# Patient Record
Sex: MALE | Race: WHITE | Employment: UNEMPLOYED | URBAN - METROPOLITAN AREA
[De-identification: names, ages, dates, MRNs, and addresses within clinical notes are randomized per-mention and may not be internally consistent; named-entity substitution may affect disease eponyms.]

---

## 2017-02-10 ENCOUNTER — HOSPITAL ENCOUNTER (OUTPATIENT)
Dept: LAB | Age: 82
Discharge: HOME | End: 2017-02-10
Payer: MEDICARE

## 2017-02-10 LAB
ALBUMIN SERPL-MCNC: 4.1 G/DL (ref 3.9–4.9)
ALP BLD-CCNC: 81 U/L (ref 35–104)
ALT SERPL-CCNC: 17 U/L (ref 0–41)
ANION GAP SERPL CALCULATED.3IONS-SCNC: 14 MEQ/L (ref 7–13)
AST SERPL-CCNC: 21 U/L (ref 0–40)
BASOPHILS ABSOLUTE: 0 K/UL (ref 0–0.2)
BASOPHILS RELATIVE PERCENT: 0.5 %
BILIRUB SERPL-MCNC: 0.7 MG/DL (ref 0–1.2)
BUN BLDV-MCNC: 16 MG/DL (ref 8–23)
CALCIUM SERPL-MCNC: 8.9 MG/DL (ref 8.6–10.2)
CHLORIDE BLD-SCNC: 102 MEQ/L (ref 98–107)
CHOLESTEROL, TOTAL: 144 MG/DL (ref 0–199)
CO2: 20 MEQ/L (ref 22–29)
CREAT SERPL-MCNC: 0.72 MG/DL (ref 0.7–1.2)
EOSINOPHILS ABSOLUTE: 0.2 K/UL (ref 0–0.7)
EOSINOPHILS RELATIVE PERCENT: 1.8 %
GFR AFRICAN AMERICAN: >60
GFR NON-AFRICAN AMERICAN: >60
GLOBULIN: 2.2 G/DL (ref 2.3–3.5)
GLUCOSE BLD-MCNC: 65 MG/DL (ref 74–109)
HCT VFR BLD CALC: 46 % (ref 42–52)
HEMOGLOBIN: 15.8 G/DL (ref 14–18)
LYMPHOCYTES ABSOLUTE: 3.2 K/UL (ref 1–4.8)
LYMPHOCYTES RELATIVE PERCENT: 32.9 %
MCH RBC QN AUTO: 32.5 PG (ref 27–31.3)
MCHC RBC AUTO-ENTMCNC: 34.4 % (ref 33–37)
MCV RBC AUTO: 94.5 FL (ref 80–100)
MONOCYTES ABSOLUTE: 0.7 K/UL (ref 0.2–0.8)
MONOCYTES RELATIVE PERCENT: 7 %
NEUTROPHILS ABSOLUTE: 5.6 K/UL (ref 1.4–6.5)
NEUTROPHILS RELATIVE PERCENT: 57.8 %
PDW BLD-RTO: 13.2 % (ref 11.5–14.5)
PLATELET # BLD: 179 K/UL (ref 130–400)
POTASSIUM SERPL-SCNC: 3.9 MEQ/L (ref 3.5–5.1)
PROSTATE SPECIFIC ANTIGEN: 0.85 NG/ML (ref 0–6.22)
RBC # BLD: 4.87 M/UL (ref 4.7–6.1)
SODIUM BLD-SCNC: 136 MEQ/L (ref 132–144)
TOTAL PROTEIN: 6.3 G/DL (ref 6.4–8.1)
WBC # BLD: 9.7 K/UL (ref 4.8–10.8)

## 2017-02-10 PROCEDURE — 82465 ASSAY BLD/SERUM CHOLESTEROL: CPT

## 2017-02-10 PROCEDURE — 85025 COMPLETE CBC W/AUTO DIFF WBC: CPT

## 2017-02-10 PROCEDURE — G0103 PSA SCREENING: HCPCS

## 2017-02-10 PROCEDURE — 80053 COMPREHEN METABOLIC PANEL: CPT

## 2017-05-02 ENCOUNTER — OFFICE VISIT (OUTPATIENT)
Dept: UROLOGY | Age: 82
End: 2017-05-02

## 2017-05-02 VITALS
DIASTOLIC BLOOD PRESSURE: 84 MMHG | HEART RATE: 64 BPM | BODY MASS INDEX: 32.23 KG/M2 | HEIGHT: 72 IN | WEIGHT: 238 LBS | SYSTOLIC BLOOD PRESSURE: 142 MMHG

## 2017-05-02 DIAGNOSIS — R31.9 HEMATURIA: Primary | ICD-10-CM

## 2017-05-02 LAB
BILIRUBIN, POC: NORMAL
BLOOD URINE, POC: NORMAL
CLARITY, POC: CLEAR
COLOR, POC: YELLOW
GLUCOSE URINE, POC: NORMAL
KETONES, POC: NORMAL
LEUKOCYTE EST, POC: NORMAL
NITRITE, POC: NORMAL
PH, POC: 5
PROTEIN, POC: NORMAL
SPECIFIC GRAVITY, POC: 1.01
UROBILINOGEN, POC: 0.2

## 2017-05-02 PROCEDURE — 1123F ACP DISCUSS/DSCN MKR DOCD: CPT | Performed by: UROLOGY

## 2017-05-02 PROCEDURE — 1036F TOBACCO NON-USER: CPT | Performed by: UROLOGY

## 2017-05-02 PROCEDURE — 4040F PNEUMOC VAC/ADMIN/RCVD: CPT | Performed by: UROLOGY

## 2017-05-02 PROCEDURE — 99213 OFFICE O/P EST LOW 20 MIN: CPT | Performed by: UROLOGY

## 2017-05-02 PROCEDURE — G8417 CALC BMI ABV UP PARAM F/U: HCPCS | Performed by: UROLOGY

## 2017-05-02 PROCEDURE — G8427 DOCREV CUR MEDS BY ELIG CLIN: HCPCS | Performed by: UROLOGY

## 2017-05-02 PROCEDURE — 81003 URINALYSIS AUTO W/O SCOPE: CPT | Performed by: UROLOGY

## 2017-05-02 RX ORDER — M-VIT,TX,IRON,MINS/CALC/FOLIC 27MG-0.4MG
1 TABLET ORAL DAILY
COMMUNITY

## 2017-05-02 RX ORDER — SODIUM PHOSPHATE,MONO-DIBASIC 19G-7G/118
500 ENEMA (ML) RECTAL 3 TIMES DAILY
COMMUNITY
End: 2018-05-03

## 2017-10-26 RX ORDER — FINASTERIDE 5 MG/1
5 TABLET, FILM COATED ORAL DAILY
Qty: 90 TABLET | Refills: 3 | Status: SHIPPED | OUTPATIENT
Start: 2017-10-26 | End: 2018-05-03

## 2017-10-27 RX ORDER — TERAZOSIN 1 MG/1
1 CAPSULE ORAL NIGHTLY
Qty: 90 CAPSULE | Refills: 3 | Status: SHIPPED | OUTPATIENT
Start: 2017-10-27 | End: 2018-05-03

## 2018-01-15 ENCOUNTER — HOSPITAL ENCOUNTER (OUTPATIENT)
Dept: LAB | Age: 83
Discharge: HOME | End: 2018-01-15
Payer: MEDICARE

## 2018-01-15 LAB
ALBUMIN SERPL-MCNC: 3.8 G/DL (ref 3.9–4.9)
ALP BLD-CCNC: 82 U/L (ref 35–104)
ALT SERPL-CCNC: 18 U/L (ref 0–41)
ANION GAP SERPL CALCULATED.3IONS-SCNC: 19 MEQ/L (ref 7–13)
AST SERPL-CCNC: 17 U/L (ref 0–40)
BILIRUB SERPL-MCNC: 0.5 MG/DL (ref 0–1.2)
BUN BLDV-MCNC: 14 MG/DL (ref 8–23)
CALCIUM SERPL-MCNC: 8.8 MG/DL (ref 8.6–10.2)
CHLORIDE BLD-SCNC: 102 MEQ/L (ref 98–107)
CHOLESTEROL, TOTAL: 138 MG/DL (ref 0–199)
CO2: 21 MEQ/L (ref 22–29)
CREAT SERPL-MCNC: 0.77 MG/DL (ref 0.7–1.2)
GFR AFRICAN AMERICAN: >60
GFR NON-AFRICAN AMERICAN: >60
GLOBULIN: 2.3 G/DL (ref 2.3–3.5)
GLUCOSE BLD-MCNC: 60 MG/DL (ref 74–109)
HCT VFR BLD CALC: 47 % (ref 42–52)
HDLC SERPL-MCNC: 36 MG/DL (ref 40–59)
HEMOGLOBIN: 16.1 G/DL (ref 14–18)
LDL CHOLESTEROL CALCULATED: 77 MG/DL (ref 0–129)
MCH RBC QN AUTO: 33.4 PG (ref 27–31.3)
MCHC RBC AUTO-ENTMCNC: 34.4 % (ref 33–37)
MCV RBC AUTO: 97.3 FL (ref 80–100)
PDW BLD-RTO: 13.1 % (ref 11.5–14.5)
PLATELET # BLD: 173 K/UL (ref 130–400)
POTASSIUM SERPL-SCNC: 4.1 MEQ/L (ref 3.5–5.1)
PROSTATE SPECIFIC ANTIGEN: 0.64 NG/ML (ref 0–6.22)
RBC # BLD: 4.83 M/UL (ref 4.7–6.1)
SODIUM BLD-SCNC: 142 MEQ/L (ref 132–144)
TOTAL PROTEIN: 6.1 G/DL (ref 6.4–8.1)
TRIGL SERPL-MCNC: 126 MG/DL (ref 0–200)
WBC # BLD: 10.5 K/UL (ref 4.8–10.8)

## 2018-01-15 PROCEDURE — 80053 COMPREHEN METABOLIC PANEL: CPT

## 2018-01-15 PROCEDURE — 80061 LIPID PANEL: CPT

## 2018-01-15 PROCEDURE — 85027 COMPLETE CBC AUTOMATED: CPT

## 2018-01-15 PROCEDURE — G0103 PSA SCREENING: HCPCS

## 2018-05-03 ENCOUNTER — OFFICE VISIT (OUTPATIENT)
Dept: UROLOGY | Age: 83
End: 2018-05-03
Payer: MEDICARE

## 2018-05-03 VITALS
SYSTOLIC BLOOD PRESSURE: 100 MMHG | BODY MASS INDEX: 31.54 KG/M2 | WEIGHT: 238 LBS | DIASTOLIC BLOOD PRESSURE: 62 MMHG | HEART RATE: 63 BPM | HEIGHT: 73 IN

## 2018-05-03 DIAGNOSIS — R31.29 MICROHEMATURIA: Primary | ICD-10-CM

## 2018-05-03 LAB
BILIRUBIN, POC: NORMAL
BLOOD URINE, POC: NORMAL
CLARITY, POC: CLEAR
COLOR, POC: YELLOW
GLUCOSE URINE, POC: NORMAL
KETONES, POC: NORMAL
LEUKOCYTE EST, POC: NORMAL
NITRITE, POC: NORMAL
PH, POC: 5.5
PROTEIN, POC: NORMAL
SPECIFIC GRAVITY, POC: 1.02
UROBILINOGEN, POC: 0.2

## 2018-05-03 PROCEDURE — 1123F ACP DISCUSS/DSCN MKR DOCD: CPT | Performed by: UROLOGY

## 2018-05-03 PROCEDURE — 4040F PNEUMOC VAC/ADMIN/RCVD: CPT | Performed by: UROLOGY

## 2018-05-03 PROCEDURE — G8427 DOCREV CUR MEDS BY ELIG CLIN: HCPCS | Performed by: UROLOGY

## 2018-05-03 PROCEDURE — G8417 CALC BMI ABV UP PARAM F/U: HCPCS | Performed by: UROLOGY

## 2018-05-03 PROCEDURE — 81003 URINALYSIS AUTO W/O SCOPE: CPT | Performed by: UROLOGY

## 2018-05-03 PROCEDURE — 1036F TOBACCO NON-USER: CPT | Performed by: UROLOGY

## 2018-05-03 PROCEDURE — 99213 OFFICE O/P EST LOW 20 MIN: CPT | Performed by: UROLOGY

## 2018-05-03 RX ORDER — FLUTICASONE PROPIONATE 50 MCG
1-2 SPRAY, SUSPENSION (ML) NASAL
COMMUNITY

## 2018-05-03 RX ORDER — MONTELUKAST SODIUM 10 MG/1
10 TABLET ORAL NIGHTLY
COMMUNITY

## 2018-08-15 RX ORDER — TERAZOSIN 1 MG/1
1 CAPSULE ORAL NIGHTLY
Qty: 90 CAPSULE | Refills: 3 | Status: SHIPPED | OUTPATIENT
Start: 2018-08-15 | End: 2019-10-21 | Stop reason: SDUPTHER

## 2018-11-29 ENCOUNTER — HOSPITAL ENCOUNTER (OUTPATIENT)
Dept: LAB | Age: 83
Discharge: HOME | End: 2018-11-29
Payer: MEDICARE

## 2018-11-29 LAB
ALBUMIN SERPL-MCNC: 3.9 G/DL (ref 3.9–4.9)
ALP BLD-CCNC: 75 U/L (ref 35–104)
ALT SERPL-CCNC: 17 U/L (ref 0–41)
ANION GAP SERPL CALCULATED.3IONS-SCNC: 16 MEQ/L (ref 7–13)
AST SERPL-CCNC: 19 U/L (ref 0–40)
BILIRUB SERPL-MCNC: 0.7 MG/DL (ref 0–1.2)
BUN BLDV-MCNC: 11 MG/DL (ref 8–23)
CALCIUM SERPL-MCNC: 9.3 MG/DL (ref 8.6–10.2)
CHLORIDE BLD-SCNC: 103 MEQ/L (ref 98–107)
CHOLESTEROL, TOTAL: 136 MG/DL (ref 0–199)
CO2: 22 MEQ/L (ref 22–29)
CREAT SERPL-MCNC: 0.82 MG/DL (ref 0.7–1.2)
GFR AFRICAN AMERICAN: >60
GFR NON-AFRICAN AMERICAN: >60
GLOBULIN: 2.6 G/DL (ref 2.3–3.5)
GLUCOSE BLD-MCNC: 66 MG/DL (ref 74–109)
HCT VFR BLD CALC: 45.9 % (ref 42–52)
HDLC SERPL-MCNC: 34 MG/DL (ref 40–59)
HEMOGLOBIN: 15.8 G/DL (ref 14–18)
LDL CHOLESTEROL CALCULATED: 64 MG/DL (ref 0–129)
MCH RBC QN AUTO: 33.3 PG (ref 27–31.3)
MCHC RBC AUTO-ENTMCNC: 34.5 % (ref 33–37)
MCV RBC AUTO: 96.6 FL (ref 80–100)
PDW BLD-RTO: 13.1 % (ref 11.5–14.5)
PLATELET # BLD: 185 K/UL (ref 130–400)
POTASSIUM SERPL-SCNC: 3.9 MEQ/L (ref 3.5–5.1)
PROSTATE SPECIFIC ANTIGEN: 0.6 NG/ML (ref 0–6.22)
RBC # BLD: 4.75 M/UL (ref 4.7–6.1)
SODIUM BLD-SCNC: 141 MEQ/L (ref 132–144)
TOTAL PROTEIN: 6.5 G/DL (ref 6.4–8.1)
TRIGL SERPL-MCNC: 190 MG/DL (ref 0–200)
TSH SERPL DL<=0.05 MIU/L-ACNC: 0.88 UIU/ML (ref 0.27–4.2)
WBC # BLD: 11.2 K/UL (ref 4.8–10.8)

## 2018-11-29 PROCEDURE — 85027 COMPLETE CBC AUTOMATED: CPT

## 2018-11-29 PROCEDURE — 84443 ASSAY THYROID STIM HORMONE: CPT

## 2018-11-29 PROCEDURE — 80061 LIPID PANEL: CPT

## 2018-11-29 PROCEDURE — G0103 PSA SCREENING: HCPCS

## 2018-11-29 PROCEDURE — 80053 COMPREHEN METABOLIC PANEL: CPT

## 2018-12-21 RX ORDER — FINASTERIDE 5 MG/1
5 TABLET, FILM COATED ORAL DAILY
Qty: 90 TABLET | Refills: 3 | Status: SHIPPED | OUTPATIENT
Start: 2018-12-21 | End: 2019-12-12 | Stop reason: SDUPTHER

## 2019-10-22 RX ORDER — TERAZOSIN 1 MG/1
1 CAPSULE ORAL NIGHTLY
Qty: 90 CAPSULE | Refills: 3 | Status: SHIPPED | OUTPATIENT
Start: 2019-10-22

## 2019-11-18 ENCOUNTER — HOSPITAL ENCOUNTER (OUTPATIENT)
Dept: LAB | Age: 84
Discharge: HOME | End: 2019-11-18
Payer: MEDICARE

## 2019-11-18 LAB
ALBUMIN SERPL-MCNC: 4.3 G/DL (ref 3.5–4.6)
ALP BLD-CCNC: 86 U/L (ref 35–104)
ALT SERPL-CCNC: 16 U/L (ref 0–41)
ANION GAP SERPL CALCULATED.3IONS-SCNC: 14 MEQ/L (ref 9–15)
AST SERPL-CCNC: 20 U/L (ref 0–40)
BILIRUB SERPL-MCNC: 0.6 MG/DL (ref 0.2–0.7)
BUN BLDV-MCNC: 16 MG/DL (ref 8–23)
CALCIUM SERPL-MCNC: 9.3 MG/DL (ref 8.5–9.9)
CHLORIDE BLD-SCNC: 105 MEQ/L (ref 95–107)
CHOLESTEROL, TOTAL: 135 MG/DL (ref 0–199)
CO2: 23 MEQ/L (ref 20–31)
CREAT SERPL-MCNC: 0.75 MG/DL (ref 0.7–1.2)
GFR AFRICAN AMERICAN: >60
GFR NON-AFRICAN AMERICAN: >60
GLOBULIN: 2.7 G/DL (ref 2.3–3.5)
GLUCOSE BLD-MCNC: 74 MG/DL (ref 70–99)
HCT VFR BLD CALC: 45.6 % (ref 42–52)
HDLC SERPL-MCNC: 33 MG/DL (ref 40–59)
HEMOGLOBIN: 15.2 G/DL (ref 14–18)
LDL CHOLESTEROL CALCULATED: 68 MG/DL (ref 0–129)
MAGNESIUM: 2.2 MG/DL (ref 1.7–2.4)
MCH RBC QN AUTO: 32.5 PG (ref 27–31.3)
MCHC RBC AUTO-ENTMCNC: 33.3 % (ref 33–37)
MCV RBC AUTO: 97.9 FL (ref 80–100)
PDW BLD-RTO: 13 % (ref 11.5–14.5)
PLATELET # BLD: 213 K/UL (ref 130–400)
POTASSIUM SERPL-SCNC: 3.8 MEQ/L (ref 3.4–4.9)
RBC # BLD: 4.66 M/UL (ref 4.7–6.1)
SODIUM BLD-SCNC: 142 MEQ/L (ref 135–144)
TOTAL PROTEIN: 7 G/DL (ref 6.3–8)
TRIGL SERPL-MCNC: 169 MG/DL (ref 0–150)
TSH SERPL DL<=0.05 MIU/L-ACNC: 0.94 UIU/ML (ref 0.44–3.86)
WBC # BLD: 11.9 K/UL (ref 4.8–10.8)

## 2019-11-18 PROCEDURE — 80061 LIPID PANEL: CPT

## 2019-11-18 PROCEDURE — 84443 ASSAY THYROID STIM HORMONE: CPT

## 2019-11-18 PROCEDURE — 85027 COMPLETE CBC AUTOMATED: CPT

## 2019-11-18 PROCEDURE — 36415 COLL VENOUS BLD VENIPUNCTURE: CPT

## 2019-11-18 PROCEDURE — 80053 COMPREHEN METABOLIC PANEL: CPT

## 2019-11-18 PROCEDURE — 83735 ASSAY OF MAGNESIUM: CPT

## 2019-12-13 RX ORDER — FINASTERIDE 5 MG/1
5 TABLET, FILM COATED ORAL DAILY
Qty: 90 TABLET | Refills: 3 | Status: SHIPPED | OUTPATIENT
Start: 2019-12-13 | End: 2020-12-07

## 2020-12-07 RX ORDER — FINASTERIDE 5 MG/1
5 TABLET, FILM COATED ORAL DAILY
Qty: 90 TABLET | Refills: 3 | Status: SHIPPED | OUTPATIENT
Start: 2020-12-07 | End: 2021-12-01

## 2021-01-13 ENCOUNTER — TELEPHONE (OUTPATIENT)
Dept: UROLOGY | Age: 86
End: 2021-01-13

## 2021-01-13 DIAGNOSIS — R35.1 NOCTURIA: Primary | ICD-10-CM

## 2021-01-13 NOTE — TELEPHONE ENCOUNTER
Madyson Leong [phone] called stating that pt has burning, frequency, nocturia. He believes that he has a UTI. Son would like to know if a urine sample is needed or if an antibiotic can be prescribed. He really does not want to bring his dad out due to [place].  Please advise

## 2021-01-14 DIAGNOSIS — R35.1 NOCTURIA: ICD-10-CM

## 2021-01-16 LAB — URINE CULTURE, ROUTINE: NORMAL

## 2021-06-08 ENCOUNTER — HOSPITAL ENCOUNTER (OUTPATIENT)
Dept: LAB | Age: 86
Discharge: HOME | End: 2021-06-08
Payer: MEDICARE

## 2021-06-08 LAB
ALBUMIN SERPL-MCNC: 4.2 G/DL (ref 3.5–4.6)
ALP BLD-CCNC: 92 U/L (ref 35–104)
ALT SERPL-CCNC: 20 U/L (ref 0–41)
ANION GAP SERPL CALCULATED.3IONS-SCNC: 16 MEQ/L (ref 9–15)
AST SERPL-CCNC: 16 U/L (ref 0–40)
BILIRUB SERPL-MCNC: 0.5 MG/DL (ref 0.2–0.7)
BUN BLDV-MCNC: 17 MG/DL (ref 8–23)
CALCIUM SERPL-MCNC: 9.3 MG/DL (ref 8.5–9.9)
CHLORIDE BLD-SCNC: 104 MEQ/L (ref 95–107)
CHOLESTEROL, TOTAL: 163 MG/DL (ref 0–199)
CO2: 21 MEQ/L (ref 20–31)
CREAT SERPL-MCNC: 0.74 MG/DL (ref 0.7–1.2)
GFR AFRICAN AMERICAN: >60
GFR NON-AFRICAN AMERICAN: >60
GLOBULIN: 2.6 G/DL (ref 2.3–3.5)
GLUCOSE BLD-MCNC: 57 MG/DL (ref 70–99)
HCT VFR BLD CALC: 46 % (ref 42–52)
HDLC SERPL-MCNC: 54 MG/DL (ref 40–59)
HEMOGLOBIN: 15.4 G/DL (ref 14–18)
LDL CHOLESTEROL CALCULATED: 94 MG/DL (ref 0–129)
MCH RBC QN AUTO: 32.3 PG (ref 27–31.3)
MCHC RBC AUTO-ENTMCNC: 33.5 % (ref 33–37)
MCV RBC AUTO: 96.2 FL (ref 80–100)
PDW BLD-RTO: 13.2 % (ref 11.5–14.5)
PLATELET # BLD: 257 K/UL (ref 130–400)
POTASSIUM SERPL-SCNC: 4.1 MEQ/L (ref 3.4–4.9)
PROSTATE SPECIFIC ANTIGEN: 0.89 NG/ML (ref 0–6.22)
RBC # BLD: 4.78 M/UL (ref 4.7–6.1)
SODIUM BLD-SCNC: 141 MEQ/L (ref 135–144)
TOTAL PROTEIN: 6.8 G/DL (ref 6.3–8)
TRIGL SERPL-MCNC: 75 MG/DL (ref 0–150)
WBC # BLD: 19.4 K/UL (ref 4.8–10.8)

## 2021-06-08 PROCEDURE — 80053 COMPREHEN METABOLIC PANEL: CPT

## 2021-06-08 PROCEDURE — 85027 COMPLETE CBC AUTOMATED: CPT

## 2021-06-08 PROCEDURE — 80061 LIPID PANEL: CPT

## 2021-06-08 PROCEDURE — 84153 ASSAY OF PSA TOTAL: CPT

## 2021-12-01 RX ORDER — FINASTERIDE 5 MG/1
5 TABLET, FILM COATED ORAL DAILY
Qty: 90 TABLET | Refills: 3 | Status: SHIPPED | OUTPATIENT
Start: 2021-12-01

## 2021-12-01 NOTE — TELEPHONE ENCOUNTER
Patient needs refills on Finasteride send to [facility].  Order is attached to sign.

## 2022-04-27 ENCOUNTER — HOSPITAL ENCOUNTER (OUTPATIENT)
Dept: LAB | Age: 87
Discharge: HOME | End: 2022-04-27
Payer: MEDICARE

## 2022-04-27 LAB
ALBUMIN SERPL-MCNC: 3.5 G/DL (ref 3.5–4.6)
ALP BLD-CCNC: 85 U/L (ref 35–104)
ALT SERPL-CCNC: 13 U/L (ref 0–41)
ANION GAP SERPL CALCULATED.3IONS-SCNC: 16 MEQ/L (ref 9–15)
AST SERPL-CCNC: 15 U/L (ref 0–40)
BASOPHILS ABSOLUTE: 0.1 K/UL (ref 0–0.2)
BASOPHILS RELATIVE PERCENT: 1 %
BILIRUB SERPL-MCNC: 0.3 MG/DL (ref 0.2–0.7)
BUN BLDV-MCNC: 18 MG/DL (ref 8–23)
CALCIUM SERPL-MCNC: 8.8 MG/DL (ref 8.5–9.9)
CHLORIDE BLD-SCNC: 105 MEQ/L (ref 95–107)
CHOLESTEROL, TOTAL: 124 MG/DL (ref 0–199)
CO2: 19 MEQ/L (ref 20–31)
CREAT SERPL-MCNC: 0.81 MG/DL (ref 0.7–1.2)
EOSINOPHILS ABSOLUTE: 0.4 K/UL (ref 0–0.7)
EOSINOPHILS RELATIVE PERCENT: 3 %
GFR AFRICAN AMERICAN: >60
GFR NON-AFRICAN AMERICAN: >60
GLOBULIN: 2.4 G/DL (ref 2.3–3.5)
GLUCOSE BLD-MCNC: 87 MG/DL (ref 70–99)
HCT VFR BLD CALC: 43.7 % (ref 42–52)
HDLC SERPL-MCNC: 34 MG/DL (ref 40–59)
HEMOGLOBIN: 14.3 G/DL (ref 14–18)
LDL CHOLESTEROL CALCULATED: 69 MG/DL (ref 0–129)
LYMPHOCYTES ABSOLUTE: 2.1 K/UL (ref 1–4.8)
LYMPHOCYTES RELATIVE PERCENT: 15 %
MCH RBC QN AUTO: 32.2 PG (ref 27–31.3)
MCHC RBC AUTO-ENTMCNC: 32.8 % (ref 33–37)
MCV RBC AUTO: 98.2 FL (ref 80–100)
METAMYELOCYTES RELATIVE PERCENT: 1 %
MONOCYTES ABSOLUTE: 0.6 K/UL (ref 0.2–0.8)
MONOCYTES RELATIVE PERCENT: 3.8 %
NEUTROPHILS ABSOLUTE: 10.7 K/UL (ref 1.4–6.5)
NEUTROPHILS RELATIVE PERCENT: 76 %
PDW BLD-RTO: 13.1 % (ref 11.5–14.5)
PLATELET # BLD: 246 K/UL (ref 130–400)
PLATELET SLIDE REVIEW: NORMAL
POIKILOCYTES: ABNORMAL
POLYCHROMASIA: ABNORMAL
POTASSIUM SERPL-SCNC: 4.1 MEQ/L (ref 3.4–4.9)
RBC # BLD: 4.45 M/UL (ref 4.7–6.1)
SMUDGE CELLS: 5.8
SODIUM BLD-SCNC: 140 MEQ/L (ref 135–144)
TOTAL PROTEIN: 5.9 G/DL (ref 6.3–8)
TRIGL SERPL-MCNC: 103 MG/DL (ref 0–150)
TSH REFLEX: 0.75 UIU/ML (ref 0.44–3.86)
WBC # BLD: 13.9 K/UL (ref 4.8–10.8)

## 2022-04-27 PROCEDURE — 80061 LIPID PANEL: CPT

## 2022-04-27 PROCEDURE — 85025 COMPLETE CBC W/AUTO DIFF WBC: CPT

## 2022-04-27 PROCEDURE — 84443 ASSAY THYROID STIM HORMONE: CPT

## 2022-04-27 PROCEDURE — 80053 COMPREHEN METABOLIC PANEL: CPT

## 2022-09-01 ENCOUNTER — HOSPITAL ENCOUNTER (OUTPATIENT)
Dept: ULTRASOUND IMAGING | Age: 87
Discharge: HOME | End: 2022-09-03
Payer: MEDICARE

## 2022-09-01 DIAGNOSIS — R22.31 FOREARM MASS, RIGHT: ICD-10-CM

## 2022-09-01 DIAGNOSIS — M79.89 SWELLING OF RIGHT UPPER EXTREMITY: ICD-10-CM

## 2022-09-01 PROCEDURE — 93971 EXTREMITY STUDY: CPT

## 2023-05-08 ENCOUNTER — APPOINTMENT (OUTPATIENT)
Dept: CT IMAGING | Age: 88
End: 2023-05-08
Payer: MEDICARE

## 2023-05-08 ENCOUNTER — HOSPITAL ENCOUNTER (INPATIENT)
Age: 88
LOS: 4 days | Discharge: NURSING, REHAB OR LONG-TERM CARE | End: 2023-05-12
Attending: INTERNAL MEDICINE | Admitting: INTERNAL MEDICINE
Payer: MEDICARE

## 2023-05-08 ENCOUNTER — APPOINTMENT (OUTPATIENT)
Dept: GENERAL RADIOLOGY | Age: 88
End: 2023-05-08
Payer: MEDICARE

## 2023-05-08 DIAGNOSIS — J18.9 PNEUMONIA OF RIGHT UPPER LOBE DUE TO INFECTIOUS ORGANISM: ICD-10-CM
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
## 2023-05-08 DIAGNOSIS — N30.01 ACUTE CYSTITIS WITH HEMATURIA: ICD-10-CM

## 2023-05-08 DIAGNOSIS — I26.99 ACUTE PULMONARY EMBOLISM, UNSPECIFIED PULMONARY EMBOLISM TYPE, UNSPECIFIED WHETHER ACUTE COR PULMONALE PRESENT (HCC): Primary | ICD-10-CM

## 2023-05-08 LAB
ALBUMIN SERPL-MCNC: 3.1 G/DL (ref 3.5–4.6)
ALP SERPL-CCNC: 74 U/L (ref 35–104)
ALT SERPL-CCNC: 8 U/L (ref 0–41)
AMORPH SED URNS QL MICRO: ABNORMAL
ANION GAP SERPL CALCULATED.3IONS-SCNC: 13 MEQ/L (ref 9–15)
ANISOCYTOSIS BLD QL SMEAR: ABNORMAL
APTT PPP: 31.6 SEC (ref 24.4–36.8)
AST SERPL-CCNC: 16 U/L (ref 0–40)
B PARAP IS1001 DNA NPH QL NAA+NON-PROBE: NOT DETECTED
B PERT.PT PRMT NPH QL NAA+NON-PROBE: NOT DETECTED
BACTERIA URNS QL MICRO: ABNORMAL /HPF
BASOPHILS # BLD: 0 K/UL (ref 0–0.2)
BASOPHILS NFR BLD: 0.1 %
BILIRUB SERPL-MCNC: 1 MG/DL (ref 0.2–0.7)
BILIRUB UR QL STRIP: NEGATIVE
BNP BLD-MCNC: 1016 PG/ML
BUN SERPL-MCNC: 27 MG/DL (ref 8–23)
C PNEUM DNA NPH QL NAA+NON-PROBE: NOT DETECTED
CALCIUM SERPL-MCNC: 8.3 MG/DL (ref 8.5–9.9)
CHLORIDE SERPL-SCNC: 100 MEQ/L (ref 95–107)
CLARITY UR: ABNORMAL
CO2 SERPL-SCNC: 20 MEQ/L (ref 20–31)
COLOR UR: ABNORMAL
CREAT SERPL-MCNC: 1.03 MG/DL (ref 0.7–1.2)
EKG ATRIAL RATE: 89 BPM
EKG P AXIS: 31 DEGREES
EKG P-R INTERVAL: 170 MS
EKG Q-T INTERVAL: 340 MS
EKG QRS DURATION: 88 MS
EKG QTC CALCULATION (BAZETT): 413 MS
EKG R AXIS: 1 DEGREES
EKG T AXIS: 54 DEGREES
EKG VENTRICULAR RATE: 89 BPM
EOSINOPHIL # BLD: 0 K/UL (ref 0–0.7)
EOSINOPHIL NFR BLD: 0 %
EPI CELLS #/AREA URNS AUTO: ABNORMAL /HPF (ref 0–5)
EPI CELLS #/AREA URNS HPF: ABNORMAL /HPF
ERYTHROCYTE [DISTWIDTH] IN BLOOD BY AUTOMATED COUNT: 13.7 % (ref 11.5–14.5)
ETHANOL PERCENT: NORMAL G/DL
ETHANOLAMINE SERPL-MCNC: <10 MG/DL (ref 0–0.08)
FLUAV RNA NPH QL NAA+NON-PROBE: NOT DETECTED
FLUBV RNA NPH QL NAA+NON-PROBE: NOT DETECTED
GLOBULIN SER CALC-MCNC: 2.4 G/DL (ref 2.3–3.5)
GLUCOSE SERPL-MCNC: 107 MG/DL (ref 70–99)
GLUCOSE UR STRIP-MCNC: NEGATIVE MG/DL
HADV DNA NPH QL NAA+NON-PROBE: NOT DETECTED
HCOV 229E RNA NPH QL NAA+NON-PROBE: NOT DETECTED
HCOV HKU1 RNA NPH QL NAA+NON-PROBE: NOT DETECTED
HCOV NL63 RNA NPH QL NAA+NON-PROBE: NOT DETECTED
HCOV OC43 RNA NPH QL NAA+NON-PROBE: NOT DETECTED
HCT VFR BLD AUTO: 37.5 % (ref 42–52)
HGB BLD-MCNC: 12.8 G/DL (ref 14–18)
HGB UR QL STRIP: ABNORMAL
HMPV RNA NPH QL NAA+NON-PROBE: NOT DETECTED
HPIV1 RNA NPH QL NAA+NON-PROBE: NOT DETECTED
HPIV2 RNA NPH QL NAA+NON-PROBE: NOT DETECTED
HPIV3 RNA NPH QL NAA+NON-PROBE: NOT DETECTED
HPIV4 RNA NPH QL NAA+NON-PROBE: NOT DETECTED
INR PPP: 1.2
KETONES UR STRIP-MCNC: 15 MG/DL
LACTATE BLDV-SCNC: 1.6 MMOL/L (ref 0.5–2.2)
LEUKOCYTE ESTERASE UR QL STRIP: ABNORMAL
LYMPHOCYTES # BLD: 3.5 K/UL (ref 1–4.8)
LYMPHOCYTES NFR BLD: 11 %
M PNEUMO DNA NPH QL NAA+NON-PROBE: NOT DETECTED
MCH RBC QN AUTO: 32.9 PG (ref 27–31.3)
MCHC RBC AUTO-ENTMCNC: 34 % (ref 33–37)
MCV RBC AUTO: 96.8 FL (ref 79–92.2)
MONOCYTES # BLD: 2.2 K/UL (ref 0.2–0.8)
MONOCYTES NFR BLD: 7 %
NEUTROPHILS # BLD: 26.1 K/UL (ref 1.4–6.5)
NEUTS BAND NFR BLD MANUAL: 4 %
NEUTS SEG NFR BLD: 79 %
NITRITE UR QL STRIP: NEGATIVE
PATH INTERP BLD-IMP: NORMAL
PATH INTERP BLD-IMP: YES
PH UR STRIP: 5.5 [PH] (ref 5–9)
PLATELET # BLD AUTO: 165 K/UL (ref 130–400)
PLATELET BLD QL SMEAR: ADEQUATE
POTASSIUM SERPL-SCNC: 3.7 MEQ/L (ref 3.4–4.9)
PROCALCITONIN SERPL IA-MCNC: 0.32 NG/ML (ref 0–0.15)
PROT SERPL-MCNC: 5.5 G/DL (ref 6.3–8)
PROT UR STRIP-MCNC: 30 MG/DL
PROTHROMBIN TIME: 15.7 SEC (ref 12.3–14.9)
RBC # BLD AUTO: 3.88 M/UL (ref 4.7–6.1)
RBC #/AREA URNS HPF: ABNORMAL /HPF (ref 0–2)
RSV RNA NPH QL NAA+NON-PROBE: NOT DETECTED
RV+EV RNA NPH QL NAA+NON-PROBE: NOT DETECTED
SARS-COV-2 RNA NPH QL NAA+NON-PROBE: NOT DETECTED
SODIUM SERPL-SCNC: 133 MEQ/L (ref 135–144)
SP GR UR STRIP: 1.02 (ref 1–1.03)
TROPONIN T SERPL-MCNC: <0.01 NG/ML (ref 0–0.01)
URINE REFLEX TO CULTURE: YES
UROBILINOGEN UR STRIP-ACNC: 1 E.U./DL
WBC # BLD AUTO: 31.5 K/UL (ref 4.8–10.8)
WBC #/AREA URNS AUTO: ABNORMAL /HPF (ref 0–5)
WBC #/AREA URNS HPF: ABNORMAL /HPF (ref 0–5)

## 2023-05-08 PROCEDURE — 70450 CT HEAD/BRAIN W/O DYE: CPT

## 2023-05-08 PROCEDURE — 6360000004 HC RX CONTRAST MEDICATION: Performed by: PHYSICIAN ASSISTANT

## 2023-05-08 PROCEDURE — 71275 CT ANGIOGRAPHY CHEST: CPT

## 2023-05-08 PROCEDURE — 85730 THROMBOPLASTIN TIME PARTIAL: CPT

## 2023-05-08 PROCEDURE — 93005 ELECTROCARDIOGRAM TRACING: CPT | Performed by: PHYSICIAN ASSISTANT

## 2023-05-08 PROCEDURE — 2580000003 HC RX 258

## 2023-05-08 PROCEDURE — 81001 URINALYSIS AUTO W/SCOPE: CPT

## 2023-05-08 PROCEDURE — 99285 EMERGENCY DEPT VISIT HI MDM: CPT

## 2023-05-08 PROCEDURE — 6370000000 HC RX 637 (ALT 250 FOR IP): Performed by: INTERNAL MEDICINE

## 2023-05-08 PROCEDURE — 87077 CULTURE AEROBIC IDENTIFY: CPT

## 2023-05-08 PROCEDURE — 87040 BLOOD CULTURE FOR BACTERIA: CPT

## 2023-05-08 PROCEDURE — 6360000002 HC RX W HCPCS

## 2023-05-08 PROCEDURE — 83880 ASSAY OF NATRIURETIC PEPTIDE: CPT

## 2023-05-08 PROCEDURE — 71045 X-RAY EXAM CHEST 1 VIEW: CPT

## 2023-05-08 PROCEDURE — 83605 ASSAY OF LACTIC ACID: CPT

## 2023-05-08 PROCEDURE — 84145 PROCALCITONIN (PCT): CPT

## 2023-05-08 PROCEDURE — 93010 ELECTROCARDIOGRAM REPORT: CPT | Performed by: INTERNAL MEDICINE

## 2023-05-08 PROCEDURE — 6360000002 HC RX W HCPCS: Performed by: INTERNAL MEDICINE

## 2023-05-08 PROCEDURE — 72125 CT NECK SPINE W/O DYE: CPT

## 2023-05-08 PROCEDURE — 84484 ASSAY OF TROPONIN QUANT: CPT

## 2023-05-08 PROCEDURE — 80053 COMPREHEN METABOLIC PANEL: CPT

## 2023-05-08 PROCEDURE — 2580000003 HC RX 258: Performed by: PHYSICIAN ASSISTANT

## 2023-05-08 PROCEDURE — 96365 THER/PROPH/DIAG IV INF INIT: CPT

## 2023-05-08 PROCEDURE — 6830039000 HC L3 TRAUMA ALERT

## 2023-05-08 PROCEDURE — 85610 PROTHROMBIN TIME: CPT

## 2023-05-08 PROCEDURE — 2580000003 HC RX 258: Performed by: INTERNAL MEDICINE

## 2023-05-08 PROCEDURE — 99223 1ST HOSP IP/OBS HIGH 75: CPT | Performed by: INTERNAL MEDICINE

## 2023-05-08 PROCEDURE — 82077 ASSAY SPEC XCP UR&BREATH IA: CPT

## 2023-05-08 PROCEDURE — 0202U NFCT DS 22 TRGT SARS-COV-2: CPT

## 2023-05-08 PROCEDURE — 74177 CT ABD & PELVIS W/CONTRAST: CPT

## 2023-05-08 PROCEDURE — 36415 COLL VENOUS BLD VENIPUNCTURE: CPT

## 2023-05-08 PROCEDURE — 85025 COMPLETE CBC W/AUTO DIFF WBC: CPT

## 2023-05-08 PROCEDURE — 87086 URINE CULTURE/COLONY COUNT: CPT

## 2023-05-08 PROCEDURE — 1210000000 HC MED SURG R&B

## 2023-05-08 RX ORDER — CARVEDILOL 6.25 MG/1
6.25 TABLET ORAL 2 TIMES DAILY WITH MEALS
Status: ON HOLD | COMMUNITY

## 2023-05-08 RX ORDER — ISOSORBIDE MONONITRATE 60 MG/1
60 TABLET, EXTENDED RELEASE ORAL DAILY
Status: DISCONTINUED | OUTPATIENT
Start: 2023-05-09 | End: 2023-05-12 | Stop reason: HOSPADM

## 2023-05-08 RX ORDER — MONTELUKAST SODIUM 10 MG/1
10 TABLET ORAL NIGHTLY
Status: DISCONTINUED | OUTPATIENT
Start: 2023-05-08 | End: 2023-05-12 | Stop reason: HOSPADM

## 2023-05-08 RX ORDER — IBUPROFEN 200 MG
200 TABLET ORAL 2 TIMES DAILY
Status: ON HOLD | COMMUNITY

## 2023-05-08 RX ORDER — 0.9 % SODIUM CHLORIDE 0.9 %
1000 INTRAVENOUS SOLUTION INTRAVENOUS ONCE
Status: COMPLETED | OUTPATIENT
Start: 2023-05-08 | End: 2023-05-08

## 2023-05-08 RX ORDER — SIMVASTATIN 20 MG
20 TABLET ORAL NIGHTLY
Status: ON HOLD | COMMUNITY

## 2023-05-08 RX ORDER — ENOXAPARIN SODIUM 150 MG/ML
1 INJECTION SUBCUTANEOUS 2 TIMES DAILY
Status: DISCONTINUED | OUTPATIENT
Start: 2023-05-08 | End: 2023-05-11

## 2023-05-08 RX ORDER — LISINOPRIL 5 MG/1
5 TABLET ORAL DAILY
Status: DISCONTINUED | OUTPATIENT
Start: 2023-05-08 | End: 2023-05-12 | Stop reason: HOSPADM

## 2023-05-08 RX ORDER — SODIUM CHLORIDE 9 MG/ML
INJECTION, SOLUTION INTRAVENOUS PRN
Status: DISCONTINUED | OUTPATIENT
Start: 2023-05-08 | End: 2023-05-12 | Stop reason: HOSPADM

## 2023-05-08 RX ORDER — ACETAMINOPHEN 325 MG/1
650 TABLET ORAL EVERY 6 HOURS PRN
Status: DISCONTINUED | OUTPATIENT
Start: 2023-05-08 | End: 2023-05-12 | Stop reason: HOSPADM

## 2023-05-08 RX ORDER — ASPIRIN 81 MG/1
81 TABLET ORAL DAILY
Status: DISCONTINUED | OUTPATIENT
Start: 2023-05-08 | End: 2023-05-12 | Stop reason: HOSPADM

## 2023-05-08 RX ORDER — FINASTERIDE 5 MG/1
5 TABLET, FILM COATED ORAL DAILY
Status: DISCONTINUED | OUTPATIENT
Start: 2023-05-08 | End: 2023-05-12 | Stop reason: HOSPADM

## 2023-05-08 RX ORDER — SODIUM CHLORIDE 0.9 % (FLUSH) 0.9 %
5-40 SYRINGE (ML) INJECTION PRN
Status: DISCONTINUED | OUTPATIENT
Start: 2023-05-08 | End: 2023-05-12 | Stop reason: HOSPADM

## 2023-05-08 RX ORDER — ACETAMINOPHEN 650 MG/1
650 SUPPOSITORY RECTAL EVERY 6 HOURS PRN
Status: DISCONTINUED | OUTPATIENT
Start: 2023-05-08 | End: 2023-05-12 | Stop reason: HOSPADM

## 2023-05-08 RX ORDER — ONDANSETRON 2 MG/ML
4 INJECTION INTRAMUSCULAR; INTRAVENOUS EVERY 6 HOURS PRN
Status: DISCONTINUED | OUTPATIENT
Start: 2023-05-08 | End: 2023-05-12 | Stop reason: HOSPADM

## 2023-05-08 RX ORDER — SODIUM CHLORIDE 0.9 % (FLUSH) 0.9 %
5-40 SYRINGE (ML) INJECTION EVERY 12 HOURS SCHEDULED
Status: DISCONTINUED | OUTPATIENT
Start: 2023-05-08 | End: 2023-05-12 | Stop reason: HOSPADM

## 2023-05-08 RX ORDER — ONDANSETRON 4 MG/1
4 TABLET, ORALLY DISINTEGRATING ORAL EVERY 8 HOURS PRN
Status: DISCONTINUED | OUTPATIENT
Start: 2023-05-08 | End: 2023-05-12 | Stop reason: HOSPADM

## 2023-05-08 RX ORDER — BUPROPION HYDROCHLORIDE 100 MG/1
100 TABLET, EXTENDED RELEASE ORAL DAILY
Status: DISCONTINUED | OUTPATIENT
Start: 2023-05-08 | End: 2023-05-12 | Stop reason: HOSPADM

## 2023-05-08 RX ORDER — ATORVASTATIN CALCIUM 10 MG/1
10 TABLET, FILM COATED ORAL NIGHTLY
Status: DISCONTINUED | OUTPATIENT
Start: 2023-05-08 | End: 2023-05-12 | Stop reason: HOSPADM

## 2023-05-08 RX ORDER — CARVEDILOL 6.25 MG/1
6.25 TABLET ORAL 2 TIMES DAILY WITH MEALS
Status: DISCONTINUED | OUTPATIENT
Start: 2023-05-08 | End: 2023-05-12 | Stop reason: HOSPADM

## 2023-05-08 RX ORDER — FLUTICASONE PROPIONATE 50 MCG
1 SPRAY, SUSPENSION (ML) NASAL DAILY
Status: DISCONTINUED | OUTPATIENT
Start: 2023-05-08 | End: 2023-05-12 | Stop reason: HOSPADM

## 2023-05-08 RX ORDER — POLYETHYLENE GLYCOL 3350 17 G/17G
17 POWDER, FOR SOLUTION ORAL DAILY PRN
Status: DISCONTINUED | OUTPATIENT
Start: 2023-05-08 | End: 2023-05-12 | Stop reason: HOSPADM

## 2023-05-08 RX ORDER — IBUPROFEN 200 MG
200 TABLET ORAL 2 TIMES DAILY
Status: DISCONTINUED | OUTPATIENT
Start: 2023-05-08 | End: 2023-05-12 | Stop reason: HOSPADM

## 2023-05-08 RX ORDER — ENOXAPARIN SODIUM 150 MG/ML
1 INJECTION SUBCUTANEOUS ONCE
Status: DISCONTINUED | OUTPATIENT
Start: 2023-05-08 | End: 2023-05-08

## 2023-05-08 RX ADMIN — BUPROPION HYDROCHLORIDE 100 MG: 100 TABLET, EXTENDED RELEASE ORAL at 22:46

## 2023-05-08 RX ADMIN — ENOXAPARIN SODIUM 105 MG: 150 INJECTION SUBCUTANEOUS at 20:44

## 2023-05-08 RX ADMIN — MONTELUKAST 10 MG: 10 TABLET, FILM COATED ORAL at 20:44

## 2023-05-08 RX ADMIN — ASPIRIN 81 MG: 81 TABLET, COATED ORAL at 18:24

## 2023-05-08 RX ADMIN — LISINOPRIL 5 MG: 5 TABLET ORAL at 18:24

## 2023-05-08 RX ADMIN — CARVEDILOL 6.25 MG: 6.25 TABLET, FILM COATED ORAL at 18:24

## 2023-05-08 RX ADMIN — ENOXAPARIN SODIUM 105 MG: 150 INJECTION SUBCUTANEOUS at 09:37

## 2023-05-08 RX ADMIN — ATORVASTATIN CALCIUM 10 MG: 10 TABLET, FILM COATED ORAL at 20:44

## 2023-05-08 RX ADMIN — AZITHROMYCIN DIHYDRATE 500 MG: 500 INJECTION, POWDER, LYOPHILIZED, FOR SOLUTION INTRAVENOUS at 08:10

## 2023-05-08 RX ADMIN — FINASTERIDE 5 MG: 5 TABLET, FILM COATED ORAL at 18:24

## 2023-05-08 RX ADMIN — VANCOMYCIN HYDROCHLORIDE 1500 MG: 1.5 INJECTION, POWDER, LYOPHILIZED, FOR SOLUTION INTRAVENOUS at 18:21

## 2023-05-08 RX ADMIN — SODIUM CHLORIDE, PRESERVATIVE FREE 10 ML: 5 INJECTION INTRAVENOUS at 09:38

## 2023-05-08 RX ADMIN — SODIUM CHLORIDE 1000 ML: 9 INJECTION, SOLUTION INTRAVENOUS at 01:38

## 2023-05-08 RX ADMIN — IOPAMIDOL 75 ML: 612 INJECTION, SOLUTION INTRAVENOUS at 02:12

## 2023-05-08 RX ADMIN — CEFTRIAXONE SODIUM 1000 MG: 1 INJECTION, POWDER, FOR SOLUTION INTRAMUSCULAR; INTRAVENOUS at 06:29

## 2023-05-08 RX ADMIN — MEROPENEM 1000 MG: 1 INJECTION, POWDER, FOR SOLUTION INTRAVENOUS at 20:25

## 2023-05-08 RX ADMIN — SODIUM CHLORIDE, PRESERVATIVE FREE 10 ML: 5 INJECTION INTRAVENOUS at 20:45

## 2023-05-08 ASSESSMENT — ENCOUNTER SYMPTOMS
TROUBLE SWALLOWING: 0
ABDOMINAL PAIN: 0
SHORTNESS OF BREATH: 0
APNEA: 0
ALLERGIC/IMMUNOLOGIC NEGATIVE: 1
EYE PAIN: 0
COLOR CHANGE: 0

## 2023-05-08 NOTE — ED NOTES
Report to [place] on floor updated given. Monitor applied     [employer].  Namita Rodriguez RN  05/08/23 1886

## 2023-05-08 NOTE — ED NOTES
Patient resting in bed with call light in reach. Breathes are even and unlabored. Skin is warm and dry. Vital signs are stable. Patient remains on bedside monitor. Patient denies any needs at this time.       Presley Candelaria RN  05/08/23 9505

## 2023-05-08 NOTE — ED TRIAGE NOTES
PT COMES TO THE ED TODAY WITH COMPLAINTS OF A FALL AND SOME PROGRESSIVE WEAKNESS FOR 4 DAYS   PER DAUGHTER PT HAS FELL TWICE TODAY. DAUGHTER REPORTS TO EMS THAT HIS FIRST FALL HAPPENED TODAY FROM OUT OF A CHAIR THAT HE WAS BENT OVER PUTTING ON HIS SHOES AND HE FELL SIDEWAYS OUT OF HIS CHAIR   PT DID BUMP HIS HEAD BUT DID NOT HAVE LOC     THE SECOND FALL WAS TONIGHT AT HOME HE ROLLED OUT OF BED HE DID NOT HIT HIS HEAD.  STATES HE WAS JUST TOO CLOSE TO THE EDGE AND ROLLED OUT     PT DOES NOT TAKE BLOOD THINNER, HAS NO NOTICEABLE DEFORMITIES AND DENIES ANY PAIN AT THIS POINT     PT LIVES AT HOME WITH HIS DAUGHTER AND HER    DOES AMBULATE WITH A CANE     PT STABLE ON ED COT   BREATHING EQUAL AND UNLABORED

## 2023-05-08 NOTE — H&P
IV CONTRAST Additional Contrast? None    Result Date: 5/8/2023  EXAMINATION: CT OF THE ABDOMEN AND PELVIS WITH CONTRAST 5/8/2023 2:12 am TECHNIQUE: CT of the abdomen and pelvis was performed with the administration of intravenous contrast. Multiplanar reformatted images are provided for review. Automated exposure control, iterative reconstruction, and/or weight based adjustment of the mA/kV was utilized to reduce the radiation dose to as low as reasonably achievable. COMPARISON: 11/08/2015 HISTORY: ORDERING SYSTEM PROVIDED HISTORY: abdominal swelling TECHNOLOGIST PROVIDED HISTORY: Reason for exam:->abdominal swelling Additional Contrast?->None Decision Support Exception - unselect if not a suspected or confirmed emergency medical condition->Emergency Medical Condition (MA) What reading provider will be dictating this exam?->CRC FINDINGS: Lower Chest:  Visualized portion of the lower chest demonstrates no acute abnormality. Organs: Medial hepatic segment cyst again noted. The gallbladder is surgically absent. The spleen, pancreas, and adrenals are within limits. Bilateral renal cysts are noted. There are nonobstructing right renal calculi. GI/Bowel: There is no evidence of bowel obstruction. No evidence of abnormal bowel wall thickening or distension. The appendix is normal.  There is diverticulosis. Pelvis: The urinary bladder is partially filled. There is prostatomegaly. Peritoneum/Retroperitoneum: No evidence of ascites or free air. No evidence of lymphadenopathy. Aorta is normal in caliber. Bones/Soft Tissues:  No acute abnormality of the visualized osseous structures. Status post L1-L2 posterior fixation. There is multilevel degenerative disc disease. No acute abdominopelvic abnormality. XR CHEST PORTABLE    Result Date: 5/8/2023  EXAMINATION: ONE XRAY VIEW OF THE CHEST 5/8/2023 2:29 am COMPARISON: None.  HISTORY: ORDERING SYSTEM PROVIDED HISTORY: fall, weakness TECHNOLOGIST PROVIDED HISTORY:

## 2023-05-08 NOTE — ED PROVIDER NOTES
1980 [facility]  EMERGENCY DEPARTMENT ENCOUNTER      Pt Name: Norbert Washington  MRN: [id number]  Concepciongflaurie 4/7/1934  Date of evaluation: 5/8/2023  Provider: RIGOBERTO Wilde    CHIEF COMPLAINT       Chief Complaint   Patient presents with    Fall         HISTORY OF PRESENT ILLNESS   (Location/Symptom, Timing/Onset, Context/Setting, Quality, Duration, Modifying Factors, Severity)  Note limiting factors. Norbert Washington is a 80 y.o. male who presents to the emergency department as per EMS who states that they received a call due to a fall. On arrival, family advised that the patient had fallen twice today, first fall was when he was in a chair bending over to tie his shoe and he fell sideways, striking his head. There is no loss of consciousness. Patient then had a second fall out of bed. Patient states that he was sleeping too close to the edge and he rolled and fell. Patient denies any injuries from the fall. Patient states his \"chronic aches and pains\" are hurting but nothing concerning from the falls. Patient is alert and oriented x3    Family later presented to the emergency department and adds to the history that over the past 2 to 3 days the patient has been much more weak than normal with poor p.o. intake, decreased appetite, intermittent abdominal discomfort as well as complaining of shortness of breath. HPI    Nursing Notes were reviewed. REVIEW OF SYSTEMS    (2-9 systems for level 4, 10 or more for level 5)     Review of Systems   Constitutional:  Positive for activity change, appetite change and fatigue. Negative for diaphoresis and fever. HENT:  Negative for hearing loss and trouble swallowing. Eyes:  Negative for pain. Respiratory:  Negative for apnea and shortness of breath. Cardiovascular:  Negative for chest pain. Gastrointestinal:  Negative for abdominal pain. Endocrine: Negative. Genitourinary:  Negative for hematuria.    Musculoskeletal:  Positive for

## 2023-05-08 NOTE — FLOWSHEET NOTE
Pt's son at bedside, updated on status with pt's permission.  States he will notify pt's daughter, Mika Garciaable of need for home med list.

## 2023-05-09 LAB
ALBUMIN SERPL-MCNC: 2.5 G/DL (ref 3.5–4.6)
ANION GAP SERPL CALCULATED.3IONS-SCNC: 11 MEQ/L (ref 9–15)
BACTERIA UR CULT: ABNORMAL
BACTERIA UR CULT: ABNORMAL
BASOPHILS # BLD: 0 K/UL (ref 0–0.2)
BASOPHILS NFR BLD: 0.1 %
BUN SERPL-MCNC: 24 MG/DL (ref 8–23)
CALCIUM SERPL-MCNC: 8.1 MG/DL (ref 8.5–9.9)
CHLORIDE SERPL-SCNC: 103 MEQ/L (ref 95–107)
CO2 SERPL-SCNC: 19 MEQ/L (ref 20–31)
CREAT SERPL-MCNC: 0.74 MG/DL (ref 0.7–1.2)
CRP SERPL HS-MCNC: 265.4 MG/L (ref 0–5)
EOSINOPHIL # BLD: 0 K/UL (ref 0–0.7)
EOSINOPHIL NFR BLD: 0.1 %
ERYTHROCYTE [DISTWIDTH] IN BLOOD BY AUTOMATED COUNT: 13.1 % (ref 11.5–14.5)
GLUCOSE SERPL-MCNC: 98 MG/DL (ref 70–99)
HCT VFR BLD AUTO: 37.4 % (ref 42–52)
HGB BLD-MCNC: 13 G/DL (ref 14–18)
LYMPHOCYTES # BLD: 4 K/UL (ref 1–4.8)
LYMPHOCYTES NFR BLD: 15.5 %
MAGNESIUM SERPL-MCNC: 2 MG/DL (ref 1.7–2.4)
MCH RBC QN AUTO: 32.9 PG (ref 27–31.3)
MCHC RBC AUTO-ENTMCNC: 34.8 % (ref 33–37)
MCV RBC AUTO: 94.4 FL (ref 79–92.2)
MONOCYTES # BLD: 1.6 K/UL (ref 0.2–0.8)
MONOCYTES NFR BLD: 6.3 %
NEUTROPHILS # BLD: 20.2 K/UL (ref 1.4–6.5)
NEUTS SEG NFR BLD: 78 %
ORGANISM: ABNORMAL
PERFORMED ON: NORMAL
PHOSPHATE SERPL-MCNC: 2 MG/DL (ref 2.3–4.8)
PLATELET # BLD AUTO: 176 K/UL (ref 130–400)
POC CREATININE: 1.1 MG/DL (ref 0.8–1.3)
POC SAMPLE TYPE: NORMAL
POTASSIUM SERPL-SCNC: 3.4 MEQ/L (ref 3.4–4.9)
PROCALCITONIN SERPL IA-MCNC: 0.44 NG/ML (ref 0–0.15)
RBC # BLD AUTO: 3.96 M/UL (ref 4.7–6.1)
SODIUM SERPL-SCNC: 133 MEQ/L (ref 135–144)
WBC # BLD AUTO: 25.9 K/UL (ref 4.8–10.8)

## 2023-05-09 PROCEDURE — 6370000000 HC RX 637 (ALT 250 FOR IP): Performed by: INTERNAL MEDICINE

## 2023-05-09 PROCEDURE — 85025 COMPLETE CBC W/AUTO DIFF WBC: CPT

## 2023-05-09 PROCEDURE — 6360000002 HC RX W HCPCS: Performed by: INTERNAL MEDICINE

## 2023-05-09 PROCEDURE — 2580000003 HC RX 258: Performed by: INTERNAL MEDICINE

## 2023-05-09 PROCEDURE — 97162 PT EVAL MOD COMPLEX 30 MIN: CPT

## 2023-05-09 PROCEDURE — 80069 RENAL FUNCTION PANEL: CPT

## 2023-05-09 PROCEDURE — 92610 EVALUATE SWALLOWING FUNCTION: CPT

## 2023-05-09 PROCEDURE — 1210000000 HC MED SURG R&B

## 2023-05-09 PROCEDURE — 99233 SBSQ HOSP IP/OBS HIGH 50: CPT | Performed by: INTERNAL MEDICINE

## 2023-05-09 PROCEDURE — 84145 PROCALCITONIN (PCT): CPT

## 2023-05-09 PROCEDURE — 36415 COLL VENOUS BLD VENIPUNCTURE: CPT

## 2023-05-09 PROCEDURE — 83735 ASSAY OF MAGNESIUM: CPT

## 2023-05-09 PROCEDURE — 97166 OT EVAL MOD COMPLEX 45 MIN: CPT

## 2023-05-09 PROCEDURE — 86140 C-REACTIVE PROTEIN: CPT

## 2023-05-09 RX ADMIN — IBUPROFEN 200 MG: 200 TABLET, FILM COATED ORAL at 09:48

## 2023-05-09 RX ADMIN — BUPROPION HYDROCHLORIDE 100 MG: 100 TABLET, EXTENDED RELEASE ORAL at 10:58

## 2023-05-09 RX ADMIN — ATORVASTATIN CALCIUM 10 MG: 10 TABLET, FILM COATED ORAL at 20:49

## 2023-05-09 RX ADMIN — MONTELUKAST 10 MG: 10 TABLET, FILM COATED ORAL at 20:49

## 2023-05-09 RX ADMIN — ASPIRIN 81 MG: 81 TABLET, COATED ORAL at 09:48

## 2023-05-09 RX ADMIN — FLUTICASONE PROPIONATE 1 SPRAY: 50 SPRAY, METERED NASAL at 09:47

## 2023-05-09 RX ADMIN — ENOXAPARIN SODIUM 105 MG: 150 INJECTION SUBCUTANEOUS at 21:57

## 2023-05-09 RX ADMIN — MEROPENEM 1000 MG: 1 INJECTION, POWDER, FOR SOLUTION INTRAVENOUS at 20:48

## 2023-05-09 RX ADMIN — FINASTERIDE 5 MG: 5 TABLET, FILM COATED ORAL at 09:48

## 2023-05-09 RX ADMIN — CARVEDILOL 6.25 MG: 6.25 TABLET, FILM COATED ORAL at 09:53

## 2023-05-09 RX ADMIN — MEROPENEM 1000 MG: 1 INJECTION, POWDER, FOR SOLUTION INTRAVENOUS at 12:15

## 2023-05-09 RX ADMIN — LISINOPRIL 5 MG: 5 TABLET ORAL at 09:48

## 2023-05-09 RX ADMIN — MEROPENEM 1000 MG: 1 INJECTION, POWDER, FOR SOLUTION INTRAVENOUS at 04:01

## 2023-05-09 RX ADMIN — ENOXAPARIN SODIUM 105 MG: 150 INJECTION SUBCUTANEOUS at 09:47

## 2023-05-09 RX ADMIN — IBUPROFEN 200 MG: 200 TABLET, FILM COATED ORAL at 20:50

## 2023-05-09 RX ADMIN — SODIUM CHLORIDE, PRESERVATIVE FREE 10 ML: 5 INJECTION INTRAVENOUS at 22:01

## 2023-05-09 RX ADMIN — ISOSORBIDE MONONITRATE 60 MG: 60 TABLET, EXTENDED RELEASE ORAL at 09:48

## 2023-05-09 RX ADMIN — SODIUM CHLORIDE, PRESERVATIVE FREE 10 ML: 5 INJECTION INTRAVENOUS at 09:47

## 2023-05-09 ASSESSMENT — PAIN SCALES - GENERAL
PAINLEVEL_OUTOF10: 2
PAINLEVEL_OUTOF10: 0
PAINLEVEL_OUTOF10: 5

## 2023-05-09 ASSESSMENT — PAIN DESCRIPTION - DESCRIPTORS
DESCRIPTORS: ACHING
DESCRIPTORS: ACHING

## 2023-05-09 ASSESSMENT — PAIN DESCRIPTION - LOCATION
LOCATION: HIP
LOCATION: HIP

## 2023-05-09 ASSESSMENT — PAIN DESCRIPTION - ORIENTATION
ORIENTATION: LEFT
ORIENTATION: LEFT

## 2023-05-09 ASSESSMENT — PAIN DESCRIPTION - PAIN TYPE: TYPE: CHRONIC PAIN

## 2023-05-09 NOTE — PLAN OF CARE
See OT evaluation for all goals and OT POC.  Electronically signed by Bucky Mccord OT on 5/9/2023 at 10:51 AM

## 2023-05-09 NOTE — FLOWSHEET NOTE
Patient was assisted to urinate into the urinal,he is alert and oriented but very unsteady even to sit up erect In bed,he denies dizziness or head aches. he was able to void 2oo ml of alva urine . 05:50 patient did not have a good sleep since he wakes up to urinate  almost every two hours. he accidentally wet his shorts so micheal care was provided and his underwear is hand washed,air dry in his bathroom.

## 2023-05-09 NOTE — ACP (ADVANCE CARE PLANNING)
Advance Care Planning   Healthcare Decision Maker:    Primary Decision Maker: Emy Staples [facility] [place] [phone]

## 2023-05-10 LAB
ALBUMIN SERPL-MCNC: 2.5 G/DL (ref 3.5–4.6)
ANION GAP SERPL CALCULATED.3IONS-SCNC: 15 MEQ/L (ref 9–15)
BASOPHILS # BLD: 0 K/UL (ref 0–0.2)
BASOPHILS NFR BLD: 0.1 %
BUN SERPL-MCNC: 25 MG/DL (ref 8–23)
CALCIUM SERPL-MCNC: 8.4 MG/DL (ref 8.5–9.9)
CHLORIDE SERPL-SCNC: 101 MEQ/L (ref 95–107)
CO2 SERPL-SCNC: 20 MEQ/L (ref 20–31)
CREAT SERPL-MCNC: 0.73 MG/DL (ref 0.7–1.2)
CRP SERPL HS-MCNC: 231.3 MG/L (ref 0–5)
EOSINOPHIL # BLD: 0.2 K/UL (ref 0–0.7)
EOSINOPHIL NFR BLD: 0.7 %
ERYTHROCYTE [DISTWIDTH] IN BLOOD BY AUTOMATED COUNT: 13.4 % (ref 11.5–14.5)
GLUCOSE SERPL-MCNC: 97 MG/DL (ref 70–99)
HCT VFR BLD AUTO: 36 % (ref 42–52)
HGB BLD-MCNC: 12.6 G/DL (ref 14–18)
LYMPHOCYTES # BLD: 4.3 K/UL (ref 1–4.8)
LYMPHOCYTES NFR BLD: 16.3 %
MAGNESIUM SERPL-MCNC: 2.2 MG/DL (ref 1.7–2.4)
MCH RBC QN AUTO: 33.1 PG (ref 27–31.3)
MCHC RBC AUTO-ENTMCNC: 35 % (ref 33–37)
MCV RBC AUTO: 94.5 FL (ref 79–92.2)
MONOCYTES # BLD: 1.8 K/UL (ref 0.2–0.8)
MONOCYTES NFR BLD: 6.7 %
NEUTROPHILS # BLD: 19.8 K/UL (ref 1.4–6.5)
NEUTS SEG NFR BLD: 76.2 %
PHOSPHATE SERPL-MCNC: 2.1 MG/DL (ref 2.3–4.8)
PLATELET # BLD AUTO: 200 K/UL (ref 130–400)
POTASSIUM SERPL-SCNC: 3.3 MEQ/L (ref 3.4–4.9)
PROCALCITONIN SERPL IA-MCNC: 0.39 NG/ML (ref 0–0.15)
RBC # BLD AUTO: 3.81 M/UL (ref 4.7–6.1)
SODIUM SERPL-SCNC: 136 MEQ/L (ref 135–144)
WBC # BLD AUTO: 26 K/UL (ref 4.8–10.8)

## 2023-05-10 PROCEDURE — 97116 GAIT TRAINING THERAPY: CPT

## 2023-05-10 PROCEDURE — 6360000002 HC RX W HCPCS: Performed by: INTERNAL MEDICINE

## 2023-05-10 PROCEDURE — 85025 COMPLETE CBC W/AUTO DIFF WBC: CPT

## 2023-05-10 PROCEDURE — 6370000000 HC RX 637 (ALT 250 FOR IP): Performed by: INTERNAL MEDICINE

## 2023-05-10 PROCEDURE — 1210000000 HC MED SURG R&B

## 2023-05-10 PROCEDURE — 99233 SBSQ HOSP IP/OBS HIGH 50: CPT | Performed by: INTERNAL MEDICINE

## 2023-05-10 PROCEDURE — 83735 ASSAY OF MAGNESIUM: CPT

## 2023-05-10 PROCEDURE — 97535 SELF CARE MNGMENT TRAINING: CPT

## 2023-05-10 PROCEDURE — 84145 PROCALCITONIN (PCT): CPT

## 2023-05-10 PROCEDURE — 2580000003 HC RX 258: Performed by: INTERNAL MEDICINE

## 2023-05-10 PROCEDURE — 36415 COLL VENOUS BLD VENIPUNCTURE: CPT

## 2023-05-10 PROCEDURE — 80069 RENAL FUNCTION PANEL: CPT

## 2023-05-10 PROCEDURE — 86140 C-REACTIVE PROTEIN: CPT

## 2023-05-10 RX ADMIN — MEROPENEM 1000 MG: 1 INJECTION, POWDER, FOR SOLUTION INTRAVENOUS at 12:05

## 2023-05-10 RX ADMIN — ISOSORBIDE MONONITRATE 60 MG: 60 TABLET, EXTENDED RELEASE ORAL at 08:23

## 2023-05-10 RX ADMIN — LISINOPRIL 5 MG: 5 TABLET ORAL at 08:23

## 2023-05-10 RX ADMIN — MEROPENEM 1000 MG: 1 INJECTION, POWDER, FOR SOLUTION INTRAVENOUS at 20:09

## 2023-05-10 RX ADMIN — MEROPENEM 1000 MG: 1 INJECTION, POWDER, FOR SOLUTION INTRAVENOUS at 04:06

## 2023-05-10 RX ADMIN — ENOXAPARIN SODIUM 105 MG: 150 INJECTION SUBCUTANEOUS at 20:04

## 2023-05-10 RX ADMIN — MONTELUKAST 10 MG: 10 TABLET, FILM COATED ORAL at 20:04

## 2023-05-10 RX ADMIN — FLUTICASONE PROPIONATE 1 SPRAY: 50 SPRAY, METERED NASAL at 08:22

## 2023-05-10 RX ADMIN — IBUPROFEN 200 MG: 200 TABLET, FILM COATED ORAL at 08:23

## 2023-05-10 RX ADMIN — ASPIRIN 81 MG: 81 TABLET, COATED ORAL at 08:23

## 2023-05-10 RX ADMIN — ENOXAPARIN SODIUM 105 MG: 150 INJECTION SUBCUTANEOUS at 08:22

## 2023-05-10 RX ADMIN — ATORVASTATIN CALCIUM 10 MG: 10 TABLET, FILM COATED ORAL at 20:04

## 2023-05-10 RX ADMIN — SODIUM CHLORIDE, PRESERVATIVE FREE 10 ML: 5 INJECTION INTRAVENOUS at 08:25

## 2023-05-10 RX ADMIN — CARVEDILOL 6.25 MG: 6.25 TABLET, FILM COATED ORAL at 08:23

## 2023-05-10 RX ADMIN — IBUPROFEN 200 MG: 200 TABLET, FILM COATED ORAL at 20:10

## 2023-05-10 RX ADMIN — SODIUM CHLORIDE, PRESERVATIVE FREE 10 ML: 5 INJECTION INTRAVENOUS at 20:09

## 2023-05-10 RX ADMIN — FINASTERIDE 5 MG: 5 TABLET, FILM COATED ORAL at 08:23

## 2023-05-10 RX ADMIN — CARVEDILOL 6.25 MG: 6.25 TABLET, FILM COATED ORAL at 17:26

## 2023-05-10 ASSESSMENT — PAIN SCALES - GENERAL
PAINLEVEL_OUTOF10: 2
PAINLEVEL_OUTOF10: 4
PAINLEVEL_OUTOF10: 0

## 2023-05-10 ASSESSMENT — PAIN SCALES - WONG BAKER: WONGBAKER_NUMERICALRESPONSE: 0

## 2023-05-10 NOTE — FLOWSHEET NOTE
Patient smiled when greeted,he is calm ,pleasant,and he denies headaches or dizziness. his heart rate regular,no jugular vein distention,lungs diminished through out lung fields,arms have several bruised areas,left elbow  abrasion. he continue to void frequently into the urinal of alva hazy urine. he denies any pain during or after urination. 21:45 patient was helped repositioned in bed per his level of comfort,he tends to favor his left side. 4:45: patient gown and blanket were bloody from hs left elbow,a complete bed  linens and bed bath was provided to the patient. his left elbow wound was covered with mepilex. he expressed his gratitude by saying \"thank you so much,I truly appreciate what you do for me. \"His call light is within his easy reach,his right shoulder was supported with a pillow and he found comfort.

## 2023-05-11 PROBLEM — M25.561 PAIN IN RIGHT KNEE: Status: ACTIVE | Noted: 2019-02-25

## 2023-05-11 PROBLEM — M48.062 SPINAL STENOSIS OF LUMBAR REGION WITH NEUROGENIC CLAUDICATION: Status: ACTIVE | Noted: 2023-05-11

## 2023-05-11 PROBLEM — Z74.09 IMPAIRED MOBILITY AND ACTIVITIES OF DAILY LIVING: Status: ACTIVE | Noted: 2023-05-11

## 2023-05-11 PROBLEM — M17.11 UNILATERAL PRIMARY OSTEOARTHRITIS, RIGHT KNEE: Status: ACTIVE | Noted: 2018-10-24

## 2023-05-11 PROBLEM — Z96.1 PSEUDOPHAKIA OF BOTH EYES: Status: ACTIVE | Noted: 2017-04-05

## 2023-05-11 PROBLEM — M25.511 PAIN IN RIGHT SHOULDER: Status: ACTIVE | Noted: 2019-02-25

## 2023-05-11 PROBLEM — M19.042 PRIMARY OSTEOARTHRITIS OF BOTH HANDS: Status: ACTIVE | Noted: 2018-03-01

## 2023-05-11 PROBLEM — Z78.9 IMPAIRED MOBILITY AND ACTIVITIES OF DAILY LIVING: Status: ACTIVE | Noted: 2023-05-11

## 2023-05-11 PROBLEM — M65.342 TRIGGER RING FINGER OF LEFT HAND: Status: ACTIVE | Noted: 2018-03-01

## 2023-05-11 PROBLEM — M17.9 OSTEOARTHRITIS OF KNEE: Status: ACTIVE | Noted: 2023-03-13

## 2023-05-11 PROBLEM — I10 ESSENTIAL HYPERTENSION, BENIGN: Status: ACTIVE | Noted: 2023-05-11

## 2023-05-11 PROBLEM — M25.511 PAIN IN RIGHT SHOULDER: Status: ACTIVE | Noted: 2023-03-13

## 2023-05-11 PROBLEM — M25.512 CHRONIC PAIN OF BOTH SHOULDERS: Status: ACTIVE | Noted: 2023-03-13

## 2023-05-11 PROBLEM — G89.29 CHRONIC PAIN OF BOTH SHOULDERS: Status: ACTIVE | Noted: 2023-03-13

## 2023-05-11 PROBLEM — M19.041 PRIMARY OSTEOARTHRITIS OF BOTH HANDS: Status: ACTIVE | Noted: 2018-03-01

## 2023-05-11 PROBLEM — H90.3 SENSORINEURAL HEARING LOSS (SNHL) OF BOTH EARS: Status: ACTIVE | Noted: 2023-05-11

## 2023-05-11 LAB
ALBUMIN SERPL-MCNC: 2.5 G/DL (ref 3.5–4.6)
ANION GAP SERPL CALCULATED.3IONS-SCNC: 10 MEQ/L (ref 9–15)
BASOPHILS # BLD: 0 K/UL (ref 0–0.2)
BASOPHILS NFR BLD: 0 %
BUN SERPL-MCNC: 19 MG/DL (ref 8–23)
CALCIUM SERPL-MCNC: 8 MG/DL (ref 8.5–9.9)
CHLORIDE SERPL-SCNC: 103 MEQ/L (ref 95–107)
CO2 SERPL-SCNC: 21 MEQ/L (ref 20–31)
CREAT SERPL-MCNC: 0.69 MG/DL (ref 0.7–1.2)
CRP SERPL HS-MCNC: 228.2 MG/L (ref 0–5)
DOHLE BOD BLD QL SMEAR: ABNORMAL
EOSINOPHIL # BLD: 0.3 K/UL (ref 0–0.7)
EOSINOPHIL NFR BLD: 1 %
ERYTHROCYTE [DISTWIDTH] IN BLOOD BY AUTOMATED COUNT: 13.2 % (ref 11.5–14.5)
GLUCOSE SERPL-MCNC: 103 MG/DL (ref 70–99)
HCT VFR BLD AUTO: 35.3 % (ref 42–52)
HGB BLD-MCNC: 12.1 G/DL (ref 14–18)
LV EF: 50 %
LVEF MODALITY: NORMAL
LYMPHOCYTES # BLD: 2.6 K/UL (ref 1–4.8)
LYMPHOCYTES NFR BLD: 10 %
MAGNESIUM SERPL-MCNC: 2 MG/DL (ref 1.7–2.4)
MCH RBC QN AUTO: 32.4 PG (ref 27–31.3)
MCHC RBC AUTO-ENTMCNC: 34.2 % (ref 33–37)
MCV RBC AUTO: 94.9 FL (ref 79–92.2)
METAMYELOCYTES NFR BLD MANUAL: 1 %
MONOCYTES # BLD: 0.5 K/UL (ref 0.2–0.8)
MONOCYTES NFR BLD: 2 %
NEUTROPHILS # BLD: 22.5 K/UL (ref 1.4–6.5)
NEUTS BAND NFR BLD MANUAL: 5 %
NEUTS SEG NFR BLD: 81 %
PHOSPHATE SERPL-MCNC: 2 MG/DL (ref 2.3–4.8)
PLATELET # BLD AUTO: 240 K/UL (ref 130–400)
PLATELET BLD QL SMEAR: ADEQUATE
POTASSIUM SERPL-SCNC: 3.2 MEQ/L (ref 3.4–4.9)
PROCALCITONIN SERPL IA-MCNC: 0.3 NG/ML (ref 0–0.15)
RBC # BLD AUTO: 3.72 M/UL (ref 4.7–6.1)
RBC MORPH BLD: NORMAL
SODIUM SERPL-SCNC: 134 MEQ/L (ref 135–144)
WBC # BLD AUTO: 25.9 K/UL (ref 4.8–10.8)

## 2023-05-11 PROCEDURE — 85025 COMPLETE CBC W/AUTO DIFF WBC: CPT

## 2023-05-11 PROCEDURE — 80069 RENAL FUNCTION PANEL: CPT

## 2023-05-11 PROCEDURE — 1210000000 HC MED SURG R&B

## 2023-05-11 PROCEDURE — 97110 THERAPEUTIC EXERCISES: CPT

## 2023-05-11 PROCEDURE — 97535 SELF CARE MNGMENT TRAINING: CPT

## 2023-05-11 PROCEDURE — 6360000002 HC RX W HCPCS: Performed by: INTERNAL MEDICINE

## 2023-05-11 PROCEDURE — 92526 ORAL FUNCTION THERAPY: CPT

## 2023-05-11 PROCEDURE — 93306 TTE W/DOPPLER COMPLETE: CPT

## 2023-05-11 PROCEDURE — 99232 SBSQ HOSP IP/OBS MODERATE 35: CPT | Performed by: INTERNAL MEDICINE

## 2023-05-11 PROCEDURE — 84145 PROCALCITONIN (PCT): CPT

## 2023-05-11 PROCEDURE — 97116 GAIT TRAINING THERAPY: CPT

## 2023-05-11 PROCEDURE — 99222 1ST HOSP IP/OBS MODERATE 55: CPT | Performed by: PHYSICAL MEDICINE & REHABILITATION

## 2023-05-11 PROCEDURE — 83735 ASSAY OF MAGNESIUM: CPT

## 2023-05-11 PROCEDURE — 36415 COLL VENOUS BLD VENIPUNCTURE: CPT

## 2023-05-11 PROCEDURE — 86140 C-REACTIVE PROTEIN: CPT

## 2023-05-11 PROCEDURE — 6370000000 HC RX 637 (ALT 250 FOR IP): Performed by: INTERNAL MEDICINE

## 2023-05-11 PROCEDURE — 2580000003 HC RX 258: Performed by: INTERNAL MEDICINE

## 2023-05-11 RX ORDER — POTASSIUM CHLORIDE 20 MEQ/1
40 TABLET, EXTENDED RELEASE ORAL DAILY
Status: DISCONTINUED | OUTPATIENT
Start: 2023-05-11 | End: 2023-05-12 | Stop reason: HOSPADM

## 2023-05-11 RX ADMIN — MEROPENEM 1000 MG: 1 INJECTION, POWDER, FOR SOLUTION INTRAVENOUS at 12:31

## 2023-05-11 RX ADMIN — LISINOPRIL 5 MG: 5 TABLET ORAL at 10:09

## 2023-05-11 RX ADMIN — ENOXAPARIN SODIUM 105 MG: 150 INJECTION SUBCUTANEOUS at 10:09

## 2023-05-11 RX ADMIN — SODIUM CHLORIDE, PRESERVATIVE FREE 10 ML: 5 INJECTION INTRAVENOUS at 20:09

## 2023-05-11 RX ADMIN — POTASSIUM CHLORIDE 40 MEQ: 1500 TABLET, EXTENDED RELEASE ORAL at 12:25

## 2023-05-11 RX ADMIN — ISOSORBIDE MONONITRATE 60 MG: 60 TABLET, EXTENDED RELEASE ORAL at 10:08

## 2023-05-11 RX ADMIN — ASPIRIN 81 MG: 81 TABLET, COATED ORAL at 10:09

## 2023-05-11 RX ADMIN — MEROPENEM 1000 MG: 1 INJECTION, POWDER, FOR SOLUTION INTRAVENOUS at 05:32

## 2023-05-11 RX ADMIN — CARVEDILOL 6.25 MG: 6.25 TABLET, FILM COATED ORAL at 10:09

## 2023-05-11 RX ADMIN — FINASTERIDE 5 MG: 5 TABLET, FILM COATED ORAL at 10:08

## 2023-05-11 RX ADMIN — CARVEDILOL 6.25 MG: 6.25 TABLET, FILM COATED ORAL at 16:54

## 2023-05-11 RX ADMIN — APIXABAN 5 MG: 5 TABLET, FILM COATED ORAL at 20:09

## 2023-05-11 RX ADMIN — FLUTICASONE PROPIONATE 1 SPRAY: 50 SPRAY, METERED NASAL at 10:08

## 2023-05-11 RX ADMIN — MEROPENEM 1000 MG: 1 INJECTION, POWDER, FOR SOLUTION INTRAVENOUS at 20:11

## 2023-05-11 RX ADMIN — IBUPROFEN 200 MG: 200 TABLET, FILM COATED ORAL at 20:13

## 2023-05-11 RX ADMIN — SODIUM CHLORIDE, PRESERVATIVE FREE 10 ML: 5 INJECTION INTRAVENOUS at 10:12

## 2023-05-11 RX ADMIN — ATORVASTATIN CALCIUM 10 MG: 10 TABLET, FILM COATED ORAL at 20:09

## 2023-05-11 RX ADMIN — MONTELUKAST 10 MG: 10 TABLET, FILM COATED ORAL at 20:09

## 2023-05-11 RX ADMIN — BUPROPION HYDROCHLORIDE 100 MG: 100 TABLET, EXTENDED RELEASE ORAL at 12:24

## 2023-05-11 RX ADMIN — IBUPROFEN 200 MG: 200 TABLET, FILM COATED ORAL at 10:08

## 2023-05-11 ASSESSMENT — ENCOUNTER SYMPTOMS
COUGH: 0
SHORTNESS OF BREATH: 1
ABDOMINAL PAIN: 0
ORTHOPNEA: 1
BLOOD IN STOOL: 0
VOMITING: 0
WHEEZING: 1
SORE THROAT: 0
CONSTIPATION: 0
STRIDOR: 0
BACK PAIN: 1
DIARRHEA: 0
PHOTOPHOBIA: 0
EYE REDNESS: 0
NAUSEA: 0
EYE PAIN: 0

## 2023-05-11 ASSESSMENT — PAIN SCALES - GENERAL: PAINLEVEL_OUTOF10: 0

## 2023-05-11 NOTE — PLAN OF CARE
Problem: Discharge Planning  Goal: Discharge to home or other facility with appropriate resources  Outcome: Progressing     Problem: Safety - Adult  Goal: Free from fall injury  Outcome: Progressing  Flowsheets (Taken 5/10/2023 0939 by Scout Griffith RN)  Free From Fall Injury: Instruct family/caregiver on patient safety     Problem: ABCDS Injury Assessment  Goal: Absence of physical injury  Recent Flowsheet Documentation  Taken 5/10/2023 0939 by Scout Griffith RN  Absence of Physical Injury: Implement safety measures based on patient assessment

## 2023-05-12 ENCOUNTER — HOSPITAL ENCOUNTER (INPATIENT)
Age: 88
LOS: 11 days | Discharge: HOME HEALTH | DRG: 175 | End: 2023-05-23
Attending: PHYSICAL MEDICINE & REHABILITATION | Admitting: PHYSICAL MEDICINE & REHABILITATION
Payer: MEDICARE

## 2023-05-12 VITALS
RESPIRATION RATE: 20 BRPM | OXYGEN SATURATION: 94 % | DIASTOLIC BLOOD PRESSURE: 63 MMHG | SYSTOLIC BLOOD PRESSURE: 133 MMHG | TEMPERATURE: 97.5 F | HEIGHT: 72 IN | BODY MASS INDEX: 30.95 KG/M2 | WEIGHT: 228.5 LBS | HEART RATE: 72 BPM

## 2023-05-12 DIAGNOSIS — M17.11 UNILATERAL PRIMARY OSTEOARTHRITIS, RIGHT KNEE: ICD-10-CM

## 2023-05-12 DIAGNOSIS — I26.02 ACUTE SADDLE PULMONARY EMBOLISM WITH ACUTE COR PULMONALE (HCC): ICD-10-CM

## 2023-05-12 DIAGNOSIS — M19.042 PRIMARY OSTEOARTHRITIS OF BOTH HANDS: ICD-10-CM

## 2023-05-12 DIAGNOSIS — M17.0 OSTEOARTHRITIS OF BOTH KNEES, UNSPECIFIED OSTEOARTHRITIS TYPE: ICD-10-CM

## 2023-05-12 DIAGNOSIS — J44.1 CHRONIC OBSTRUCTIVE PULMONARY DISEASE WITH ACUTE EXACERBATION (HCC): Primary | ICD-10-CM

## 2023-05-12 DIAGNOSIS — M19.041 PRIMARY OSTEOARTHRITIS OF BOTH HANDS: ICD-10-CM

## 2023-05-12 PROBLEM — A41.9 SEPSIS SECONDARY TO UTI (HCC): Status: ACTIVE | Noted: 2023-05-12

## 2023-05-12 PROBLEM — N39.0 SEPSIS SECONDARY TO UTI (HCC): Status: ACTIVE | Noted: 2023-05-12

## 2023-05-12 LAB
ALBUMIN SERPL-MCNC: 2.2 G/DL (ref 3.5–4.6)
ANION GAP SERPL CALCULATED.3IONS-SCNC: 9 MEQ/L (ref 9–15)
BASOPHILS # BLD: 0 K/UL (ref 0–0.2)
BASOPHILS NFR BLD: 0.1 %
BUN SERPL-MCNC: 14 MG/DL (ref 8–23)
CALCIUM SERPL-MCNC: 8.1 MG/DL (ref 8.5–9.9)
CHLORIDE SERPL-SCNC: 105 MEQ/L (ref 95–107)
CO2 SERPL-SCNC: 23 MEQ/L (ref 20–31)
CREAT SERPL-MCNC: 0.62 MG/DL (ref 0.7–1.2)
CRP SERPL HS-MCNC: 211.8 MG/L (ref 0–5)
EOSINOPHIL # BLD: 0.5 K/UL (ref 0–0.7)
EOSINOPHIL NFR BLD: 2.1 %
ERYTHROCYTE [DISTWIDTH] IN BLOOD BY AUTOMATED COUNT: 13.5 % (ref 11.5–14.5)
GLUCOSE SERPL-MCNC: 111 MG/DL (ref 70–99)
HCT VFR BLD AUTO: 34 % (ref 42–52)
HGB BLD-MCNC: 11.7 G/DL (ref 14–18)
LYMPHOCYTES # BLD: 4.5 K/UL (ref 1–4.8)
LYMPHOCYTES NFR BLD: 20.3 %
MCH RBC QN AUTO: 32.5 PG (ref 27–31.3)
MCHC RBC AUTO-ENTMCNC: 34.4 % (ref 33–37)
MCV RBC AUTO: 94.6 FL (ref 79–92.2)
MONOCYTES # BLD: 1.4 K/UL (ref 0.2–0.8)
MONOCYTES NFR BLD: 6.5 %
NEUTROPHILS # BLD: 15.6 K/UL (ref 1.4–6.5)
NEUTS SEG NFR BLD: 71 %
PHOSPHATE SERPL-MCNC: 2 MG/DL (ref 2.3–4.8)
PLATELET # BLD AUTO: 267 K/UL (ref 130–400)
POTASSIUM SERPL-SCNC: 3.5 MEQ/L (ref 3.4–4.9)
PROCALCITONIN SERPL IA-MCNC: 0.25 NG/ML (ref 0–0.15)
RBC # BLD AUTO: 3.59 M/UL (ref 4.7–6.1)
SARS-COV-2 RDRP RESP QL NAA+PROBE: NOT DETECTED
SODIUM SERPL-SCNC: 137 MEQ/L (ref 135–144)
WBC # BLD AUTO: 22 K/UL (ref 4.8–10.8)

## 2023-05-12 PROCEDURE — 84145 PROCALCITONIN (PCT): CPT

## 2023-05-12 PROCEDURE — 80069 RENAL FUNCTION PANEL: CPT

## 2023-05-12 PROCEDURE — 2580000003 HC RX 258: Performed by: INTERNAL MEDICINE

## 2023-05-12 PROCEDURE — 6370000000 HC RX 637 (ALT 250 FOR IP): Performed by: INTERNAL MEDICINE

## 2023-05-12 PROCEDURE — 99232 SBSQ HOSP IP/OBS MODERATE 35: CPT | Performed by: PHYSICAL MEDICINE & REHABILITATION

## 2023-05-12 PROCEDURE — 94667 MNPJ CHEST WALL 1ST: CPT

## 2023-05-12 PROCEDURE — 94640 AIRWAY INHALATION TREATMENT: CPT

## 2023-05-12 PROCEDURE — 99232 SBSQ HOSP IP/OBS MODERATE 35: CPT | Performed by: INTERNAL MEDICINE

## 2023-05-12 PROCEDURE — 1180000000 HC REHAB R&B

## 2023-05-12 PROCEDURE — 36415 COLL VENOUS BLD VENIPUNCTURE: CPT

## 2023-05-12 PROCEDURE — 6360000002 HC RX W HCPCS: Performed by: INTERNAL MEDICINE

## 2023-05-12 PROCEDURE — 87635 SARS-COV-2 COVID-19 AMP PRB: CPT

## 2023-05-12 PROCEDURE — 99222 1ST HOSP IP/OBS MODERATE 55: CPT | Performed by: INTERNAL MEDICINE

## 2023-05-12 PROCEDURE — 86140 C-REACTIVE PROTEIN: CPT

## 2023-05-12 PROCEDURE — 94761 N-INVAS EAR/PLS OXIMETRY MLT: CPT

## 2023-05-12 PROCEDURE — 6370000000 HC RX 637 (ALT 250 FOR IP): Performed by: PHYSICAL MEDICINE & REHABILITATION

## 2023-05-12 PROCEDURE — 85025 COMPLETE CBC W/AUTO DIFF WBC: CPT

## 2023-05-12 PROCEDURE — 94664 DEMO&/EVAL PT USE INHALER: CPT

## 2023-05-12 RX ORDER — SODIUM CHLORIDE 0.9 % (FLUSH) 0.9 %
5-40 SYRINGE (ML) INJECTION EVERY 12 HOURS SCHEDULED
Status: DISCONTINUED | OUTPATIENT
Start: 2023-05-12 | End: 2023-05-23 | Stop reason: HOSPADM

## 2023-05-12 RX ORDER — ACETAMINOPHEN 650 MG/1
650 SUPPOSITORY RECTAL EVERY 6 HOURS PRN
Status: DISCONTINUED | OUTPATIENT
Start: 2023-05-12 | End: 2023-05-16

## 2023-05-12 RX ORDER — MONTELUKAST SODIUM 10 MG/1
10 TABLET ORAL NIGHTLY
Status: CANCELLED | OUTPATIENT
Start: 2023-05-12

## 2023-05-12 RX ORDER — FLUTICASONE PROPIONATE 50 MCG
1 SPRAY, SUSPENSION (ML) NASAL DAILY
Status: CANCELLED | OUTPATIENT
Start: 2023-05-13

## 2023-05-12 RX ORDER — MONTELUKAST SODIUM 10 MG/1
10 TABLET ORAL NIGHTLY
Status: DISCONTINUED | OUTPATIENT
Start: 2023-05-12 | End: 2023-05-23 | Stop reason: HOSPADM

## 2023-05-12 RX ORDER — ATORVASTATIN CALCIUM 10 MG/1
10 TABLET, FILM COATED ORAL NIGHTLY
Status: CANCELLED | OUTPATIENT
Start: 2023-05-12

## 2023-05-12 RX ORDER — ACETAMINOPHEN 325 MG/1
650 TABLET ORAL EVERY 6 HOURS PRN
Status: CANCELLED | OUTPATIENT
Start: 2023-05-12

## 2023-05-12 RX ORDER — FLUTICASONE PROPIONATE 50 MCG
1 SPRAY, SUSPENSION (ML) NASAL DAILY
Status: DISCONTINUED | OUTPATIENT
Start: 2023-05-13 | End: 2023-05-23 | Stop reason: HOSPADM

## 2023-05-12 RX ORDER — ALBUTEROL SULFATE 2.5 MG/3ML
2.5 SOLUTION RESPIRATORY (INHALATION) EVERY 6 HOURS PRN
Status: DISCONTINUED | OUTPATIENT
Start: 2023-05-12 | End: 2023-05-12

## 2023-05-12 RX ORDER — ASPIRIN 81 MG/1
81 TABLET ORAL DAILY
Status: DISCONTINUED | OUTPATIENT
Start: 2023-05-13 | End: 2023-05-23 | Stop reason: HOSPADM

## 2023-05-12 RX ORDER — ACETAMINOPHEN 325 MG/1
650 TABLET ORAL EVERY 6 HOURS PRN
Status: DISCONTINUED | OUTPATIENT
Start: 2023-05-12 | End: 2023-05-23 | Stop reason: HOSPADM

## 2023-05-12 RX ORDER — LISINOPRIL 5 MG/1
5 TABLET ORAL DAILY
Status: DISCONTINUED | OUTPATIENT
Start: 2023-05-13 | End: 2023-05-13

## 2023-05-12 RX ORDER — SODIUM CHLORIDE 9 MG/ML
INJECTION, SOLUTION INTRAVENOUS PRN
Status: DISCONTINUED | OUTPATIENT
Start: 2023-05-12 | End: 2023-05-23 | Stop reason: HOSPADM

## 2023-05-12 RX ORDER — ANALGESIC BALM 1.74; 4.06 G/29G; G/29G
OINTMENT TOPICAL 2 TIMES DAILY
Status: DISCONTINUED | OUTPATIENT
Start: 2023-05-12 | End: 2023-05-23 | Stop reason: HOSPADM

## 2023-05-12 RX ORDER — ACETAMINOPHEN 650 MG/1
650 SUPPOSITORY RECTAL EVERY 6 HOURS PRN
Status: CANCELLED | OUTPATIENT
Start: 2023-05-12

## 2023-05-12 RX ORDER — SODIUM CHLORIDE 0.9 % (FLUSH) 0.9 %
5-40 SYRINGE (ML) INJECTION EVERY 12 HOURS SCHEDULED
Status: CANCELLED | OUTPATIENT
Start: 2023-05-12

## 2023-05-12 RX ORDER — BUPROPION HYDROCHLORIDE 100 MG/1
100 TABLET, EXTENDED RELEASE ORAL DAILY
Status: CANCELLED | OUTPATIENT
Start: 2023-05-13

## 2023-05-12 RX ORDER — POLYETHYLENE GLYCOL 3350 17 G/17G
17 POWDER, FOR SOLUTION ORAL DAILY PRN
Status: DISCONTINUED | OUTPATIENT
Start: 2023-05-12 | End: 2023-05-23 | Stop reason: HOSPADM

## 2023-05-12 RX ORDER — SODIUM CHLORIDE 9 MG/ML
INJECTION, SOLUTION INTRAVENOUS PRN
Status: CANCELLED | OUTPATIENT
Start: 2023-05-12

## 2023-05-12 RX ORDER — ALBUTEROL SULFATE 2.5 MG/3ML
2.5 SOLUTION RESPIRATORY (INHALATION) 3 TIMES DAILY
Status: DISCONTINUED | OUTPATIENT
Start: 2023-05-13 | End: 2023-05-20

## 2023-05-12 RX ORDER — ALBUTEROL SULFATE 2.5 MG/3ML
2.5 SOLUTION RESPIRATORY (INHALATION)
Status: DISCONTINUED | OUTPATIENT
Start: 2023-05-12 | End: 2023-05-20

## 2023-05-12 RX ORDER — POLYETHYLENE GLYCOL 3350 17 G/17G
17 POWDER, FOR SOLUTION ORAL DAILY PRN
Status: CANCELLED | OUTPATIENT
Start: 2023-05-12

## 2023-05-12 RX ORDER — ISOSORBIDE MONONITRATE 60 MG/1
60 TABLET, EXTENDED RELEASE ORAL DAILY
Status: CANCELLED | OUTPATIENT
Start: 2023-05-13

## 2023-05-12 RX ORDER — FINASTERIDE 5 MG/1
5 TABLET, FILM COATED ORAL DAILY
Status: CANCELLED | OUTPATIENT
Start: 2023-05-13

## 2023-05-12 RX ORDER — ATORVASTATIN CALCIUM 10 MG/1
10 TABLET, FILM COATED ORAL NIGHTLY
Status: DISCONTINUED | OUTPATIENT
Start: 2023-05-12 | End: 2023-05-23 | Stop reason: HOSPADM

## 2023-05-12 RX ORDER — FINASTERIDE 5 MG/1
5 TABLET, FILM COATED ORAL DAILY
Status: DISCONTINUED | OUTPATIENT
Start: 2023-05-13 | End: 2023-05-23 | Stop reason: HOSPADM

## 2023-05-12 RX ORDER — POTASSIUM CHLORIDE 20 MEQ/1
40 TABLET, EXTENDED RELEASE ORAL DAILY
Status: DISCONTINUED | OUTPATIENT
Start: 2023-05-13 | End: 2023-05-23 | Stop reason: HOSPADM

## 2023-05-12 RX ORDER — POTASSIUM CHLORIDE 20 MEQ/1
40 TABLET, EXTENDED RELEASE ORAL DAILY
Status: CANCELLED | OUTPATIENT
Start: 2023-05-13

## 2023-05-12 RX ORDER — LISINOPRIL 5 MG/1
5 TABLET ORAL DAILY
Status: CANCELLED | OUTPATIENT
Start: 2023-05-13

## 2023-05-12 RX ORDER — CARVEDILOL 6.25 MG/1
6.25 TABLET ORAL 2 TIMES DAILY WITH MEALS
Status: CANCELLED | OUTPATIENT
Start: 2023-05-12

## 2023-05-12 RX ORDER — BUPROPION HYDROCHLORIDE 100 MG/1
100 TABLET, EXTENDED RELEASE ORAL DAILY
Status: DISCONTINUED | OUTPATIENT
Start: 2023-05-13 | End: 2023-05-23 | Stop reason: HOSPADM

## 2023-05-12 RX ORDER — CARVEDILOL 6.25 MG/1
6.25 TABLET ORAL 2 TIMES DAILY WITH MEALS
Status: DISCONTINUED | OUTPATIENT
Start: 2023-05-12 | End: 2023-05-23 | Stop reason: HOSPADM

## 2023-05-12 RX ORDER — ISOSORBIDE MONONITRATE 60 MG/1
60 TABLET, EXTENDED RELEASE ORAL DAILY
Status: DISCONTINUED | OUTPATIENT
Start: 2023-05-13 | End: 2023-05-23 | Stop reason: HOSPADM

## 2023-05-12 RX ORDER — ASPIRIN 81 MG/1
81 TABLET ORAL DAILY
Status: CANCELLED | OUTPATIENT
Start: 2023-05-13

## 2023-05-12 RX ADMIN — CEFTRIAXONE SODIUM 1000 MG: 1 INJECTION, POWDER, FOR SOLUTION INTRAMUSCULAR; INTRAVENOUS at 16:14

## 2023-05-12 RX ADMIN — Medication 10 ML: at 21:11

## 2023-05-12 RX ADMIN — ACETAMINOPHEN 650 MG: 325 TABLET ORAL at 20:54

## 2023-05-12 RX ADMIN — ATORVASTATIN CALCIUM 10 MG: 10 TABLET, FILM COATED ORAL at 20:54

## 2023-05-12 RX ADMIN — BUPROPION HYDROCHLORIDE 100 MG: 100 TABLET, EXTENDED RELEASE ORAL at 11:46

## 2023-05-12 RX ADMIN — ALBUTEROL SULFATE 2.5 MG: 2.5 SOLUTION RESPIRATORY (INHALATION) at 19:54

## 2023-05-12 RX ADMIN — APIXABAN 5 MG: 5 TABLET, FILM COATED ORAL at 09:03

## 2023-05-12 RX ADMIN — FLUTICASONE PROPIONATE 1 SPRAY: 50 SPRAY, METERED NASAL at 09:03

## 2023-05-12 RX ADMIN — CARVEDILOL 6.25 MG: 6.25 TABLET, FILM COATED ORAL at 21:05

## 2023-05-12 RX ADMIN — ISOSORBIDE MONONITRATE 60 MG: 60 TABLET, EXTENDED RELEASE ORAL at 09:03

## 2023-05-12 RX ADMIN — MEROPENEM 1000 MG: 1 INJECTION, POWDER, FOR SOLUTION INTRAVENOUS at 05:26

## 2023-05-12 RX ADMIN — ASPIRIN 81 MG: 81 TABLET, COATED ORAL at 09:03

## 2023-05-12 RX ADMIN — POTASSIUM CHLORIDE 40 MEQ: 1500 TABLET, EXTENDED RELEASE ORAL at 09:04

## 2023-05-12 RX ADMIN — MONTELUKAST SODIUM 10 MG: 10 TABLET, FILM COATED ORAL at 20:54

## 2023-05-12 RX ADMIN — LISINOPRIL 5 MG: 5 TABLET ORAL at 09:03

## 2023-05-12 RX ADMIN — MEROPENEM 1000 MG: 1 INJECTION, POWDER, FOR SOLUTION INTRAVENOUS at 11:47

## 2023-05-12 RX ADMIN — CARVEDILOL 6.25 MG: 6.25 TABLET, FILM COATED ORAL at 09:04

## 2023-05-12 RX ADMIN — IBUPROFEN 200 MG: 200 TABLET, FILM COATED ORAL at 09:03

## 2023-05-12 RX ADMIN — FINASTERIDE 5 MG: 5 TABLET, FILM COATED ORAL at 09:03

## 2023-05-12 RX ADMIN — SODIUM CHLORIDE, PRESERVATIVE FREE 10 ML: 5 INJECTION INTRAVENOUS at 09:04

## 2023-05-12 RX ADMIN — MENTHOL AND METHYL SALICYLATE: 7.6; 29 OINTMENT TOPICAL at 22:27

## 2023-05-12 RX ADMIN — APIXABAN 5 MG: 5 TABLET, FILM COATED ORAL at 20:54

## 2023-05-12 ASSESSMENT — PAIN SCALES - GENERAL
PAINLEVEL_OUTOF10: 7
PAINLEVEL_OUTOF10: 6

## 2023-05-12 ASSESSMENT — PAIN DESCRIPTION - ORIENTATION
ORIENTATION: RIGHT
ORIENTATION: RIGHT

## 2023-05-12 ASSESSMENT — PAIN DESCRIPTION - LOCATION
LOCATION: SHOULDER
LOCATION: SHOULDER

## 2023-05-12 NOTE — CONSULTS
Physical Medicine & Rehabilitation  Consult Note      Admitting Physician: Marissa Rendon MD    Primary Care Provider: Cam Mckinnon MD     Reason for Consult:  Asses rehab needs, promote physical and mental function, analyze level of care to determine rehab needs, improve ability to actively participate in the rehabilitation process, and decrease likelihood of re-admit to the hospital after discharge. History of Present Illness:    Gallo Blair is a 80 y.o. male admitted to [facility] on 5/8/2023. Patient was admitted through the ER at [facility] on May 8, 2023 after several falls at home. He admitted to striking his head during one of the falls. He was brought in via EMS after the family called 911. Upon arrival he was complaining of shortness of breath. He was eventually found to have had a-Right-sided pulmonary emboli. He is now on therapeutic dose of Lovenox. He was admitted to the care of the hospitalist with pulmonology consulting. Pulmonology assessed him and found that he had the right upper lobe pulmonary embolism as well as a right upper lobe cavity pneumonia rule out aspiration. They advised continuing antibiotic Merrem. Titration of O2. Shortness of Breath  This is a new problem. The current episode started in the past 7 days. The problem occurs constantly. The problem has been gradually improving. Associated symptoms include leg swelling, orthopnea and wheezing. Pertinent negatives include no abdominal pain, chest pain, ear pain, fever, headaches, neck pain, rash, sore throat or vomiting. The symptoms are aggravated by lying flat. He has tried body position changes, cool air, rest and oral steroids for the symptoms. His past medical history is significant for CAD, chronic lung disease and PE. I reviewed recent nursing notes discussed care with acute care providers, \" Pt is A&Ox4 vital signs are stable. Pt ambulate with 1 assist and walker.  Pt
history of present illness  Other 14 point review of system is negative. PHYSICAL EXAM:    Vitals:  BP (!) 141/75   Pulse 97   Temp 98.1 °F (36.7 °C) (Oral)   Resp 20   Ht 6' (1.829 m)   Wt 228 lb 8 oz (103.6 kg)   SpO2 98%   BMI 30.99 kg/m²   General:   Alert, awake, Oriented x3  .comfortable in bed, No distress. Head: Atraumatic , Normocephalic   Eyes: PERRL. No sclera icterus. No conjunctival injection. No discharge   ENT: No nasal  discharge. Pharynx clear. Neck:  Trachea midline. No thyromegaly, no JVD, No cervical adenopathy. Chest : Bilaterally symmetrical ,Normal effort,  No accessory muscle use  Lung : Diminished BS bilateraly. No Rales. No wheezing. No rhonchi. Heart[de-identified] Normal  rate. Regular rhythm. No mumur ,  Rub or gallop  ABD: Obese non-tender. Non-distended. No masses. No organmegaly. Normal bowel sounds. No hernia. Musculoskeleton: normal range of motion in all extremites, strength and tone   Extremities. Trace pitting in both lower leg , No Cyanosis ,No clubbing  Neuro: no cranial nerve abnormality, normal reflex and sensation, no focal weakness   Skin: Warm and dry. No erythema rash on exposed extremities. Data Review  Recent Labs     05/08/23  0015   WBC 31.5*   HGB 12.8*   HCT 37.5*         Recent Labs     05/08/23  0015   *   K 3.7      CO2 20   BUN 27*   CREATININE 1.03   GLUCOSE 107*       MV Settings: ABGs: No results for input(s): PHART, GDY9OVW, PO2ART, QXT1VHS, BEART, W4YQPYMD, MIY0QOG in the last 72 hours.   O2 Device: None (Room air)  Lab Results   Component Value Date/Time    LACTA 1.6 05/08/2023 12:15 AM       Radiology  CT HEAD WO CONTRAST    Result Date: 5/8/2023  EXAMINATION: CT OF THE HEAD WITHOUT CONTRAST; CT OF THE CERVICAL SPINE WITHOUT CONTRAST 5/8/2023 2:12 am TECHNIQUE: CT of the head was performed without the administration of intravenous contrast. Automated exposure control, iterative reconstruction, and/or weight based
emboli    IMPRESSION:    Sepsis secondary to UTI  Acute pulmonary embolus, doubt pneumonia  Persistent leukocytosis, rule out chronic leukemia if persistent despite antibiotic therapy      Patient Active Problem List   Diagnosis    Chronic cholecystitis with calculus    PE (pulmonary thromboembolism) (HCC)    Impaired mobility and activities of daily living    Essential hypertension, benign    Displacement of lumbar intervertebral disc without myelopathy    Coronary atherosclerosis    Old MI (myocardial infarction)    Primary osteoarthritis of both hands    Osteoarthritis of knee    Pseudophakia of both eyes    Zoster ophthalmicus    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    Trigger ring finger of left hand    Tear film insufficiency, unspecified    Sensorineural hearing loss (SNHL) of both ears    Hyperlipidemia    Pain in right knee    Pain in right shoulder    Unilateral primary osteoarthritis, right knee    Chronic pain of both shoulders    Spinal stenosis of lumbar region with neurogenic claudication       PLAN:  Change meropenem to IV Rocephin for antibiotics de-escalation  Postvoid bladder scan to rule out urinary retention  Follow-up CBC, consult heme-onc in 1 week if persistent leukocytosis  Follow-up with urology for benign prostatic hypertrophy  Follow-up clinically  Agree with current treatment of PE with anticoagulation    Discussed with patient    Leighann Ferrell MD

## 2023-05-12 NOTE — CARE COORDINATION
Case Management Assessment  Initial Evaluation    Date/Time of Evaluation: 5/9/2023 11:02 AM  Assessment Completed by: Eileen Mccarthy RN    If patient is discharged prior to next notation, then this note serves as note for discharge by case management. Patient Name: Daly Min                   YOB: 1934  Diagnosis: PE (pulmonary thromboembolism) ([facility] [address] [I26.99]                   Date / Time: 5/8/2023 12:09 AM    Patient Admission Status: Inpatient   Readmission Risk (Low < 19, Mod (19-27), High > 27): Readmission Risk Score: 13.9    Current PCP: Sridevi Brown MD  PCP verified by CM? Yes    Chart Reviewed: Yes      History Provided by: Patient  Patient Orientation: Alert and Oriented    Patient Cognition: Alert    Hospitalization in the last 30 days (Readmission):  No    If yes, Readmission Assessment in CM Navigator will be completed. Advance Directives:      Code Status: Full Code   Patient's Primary Decision Maker is: Named in [address] [place] [address]    Primary Decision MakerMarizatie Lei - Child - [phone]    Discharge Planning:    Patient lives with: Children Type of Home: House  Primary Care Giver: Family  Patient Support Systems include: Children, Family Members   Current Financial resources: Medicare  Current community resources: None  Current services prior to admission: None            Current DME:  CANES, WALKER, OTHER DME AVAILABLE AT HOME IF NEEDED. Type of Home Care services:  None    ADLS  Prior functional level: Independent in ADLs/IADLs  Current functional level: Independent in ADLs/IADLs, Mobility    PT AM-PAC: 16 /24  OT AM-PAC: 15 /24    Family can provide assistance at DC: Yes (DTR, LYNN)  Would you like Case Management to discuss the discharge plan with any other family members/significant others, and if so, who?  Yes (DTR)  Plans to Return to Present Housing: Yes  Other Identified Issues/Barriers to RETURNING to current housing: MEDICAL STABILITY
Inpatient Rehab referral received. Met with patient and explained [facility] Acute Inpatient Rehab program and requirements, including 3 hours of intense therapy daily, anticipated length of stay and goal of discharge to home. All questions answered and patient verbalized understanding. Freedom of choice provided and patient requests admit to [facility] if appropriate. PM&R consult pending. Patient is from home with his daughter and she was present during our discussion. The patient would like rehab to get stronger before returning home.   Electronically signed by Ignacio Hughes RN on 5/11/23 at 7:52 AM EDT
Met with patient. Definition of pneumonia discussed. Causes of different types of pneumonia reviewed. Symptoms discussed and pt does understand that they may have some or all of these symptoms. Testing to diagnose pneumonia reviewed as well as possible treatments. Importance of avoiding infections discussed including: taking medication as directed, washing hands, disposing of used tissues, getting the pneumonia and flu vaccines, and avoiding others who are ill. Importance of not smoking and to avoid others who may be smoking around patient stressed. Pneumonia Zones also reviewed. \"Green\" zone is the goal, \"Yellow\" zone means to call the doctor, and \"Red\" zone means to call the doctor ASAP or call 911. Copies of Pneumonia booklet and Zone Pamphlet given to patient. Offer for patient to express any concerns or questions. Pt denies having further questions at this time.      Electronically signed by Sera Rizo RN on 5/9/2023 at 1:09 PM
PAS reviewed by PM&R and the patient has been accepted to acute inpatient rehab and he can admit to room 255 pending medical clearance and a negative Covid screening. Spoke with James Mckeon on 4WT, and Fariha on rehab.   Electronically signed by Keshia Shetty RN on 5/11/23 at 2:08 PM EDT
Patient accepted to [address]   Electronically signed by Ana Romero RN on 5/11/23 at 1:05 PM EDT
SPOKE W/DTR Elaine Marquez TO UPDATE ON STATUS AND PT/OT EVALUATIONS. SHE AGREES THAT PT WOULD BENEFIT FROM ONGOING PT/OT UPON DISCHARGE. HER BROTHER WILL BE HERE SHORTLY TO VISIT AND KIM WILL BE HERE LATER THIS AFTERNOON. THEY WILL DISCUSS THERAPY OPTIONS AT DISCHARGE. WILL FOLLOW. THIS CM SHARED PT/OT RECOMMENDATIONS AND PT DID DECLINE NEEDS. PT STATES, \"MY DAUGHTER CAN MAKE SURE I AM DOING SOME THERAPY. \"
This LSW met with patient and spoke with daughter, Mike Dear via phone this afternoon. I informed them that PT is recommending [facility] program- patient and daughter are accepting of recommendation. I notified Dr. Kandace Hartley and Noemí/faraz for [facility] of patient request.  Chio Villegas / DOLORES to follow.   Electronically signed by YOGESH Nichole, AMERICO on 5/10/23 at 3:38 PM EDT
This LSW met with patient at bedside this afternoon. Patient and I discussed discharge plans. Patient is hopeful to be transferred to [facility] upon discharge. LSW / CM to follow.   Electronically signed by YOGESH Stoddard, AMERICO on 5/11/23 at 1:36 PM EDT
This LSW met with patient at bedside this am. Patient will transfer to [facility] when medically cleared. 2nd IMM completed with patient, copy given to patient. LSW / CM to follow.   Electronically signed by YOGESH Garber, LSW on 5/12/23 at 10:17 AM EDT
assistance (05/10/23 1149)  Scooting: Stand by assistance (05/10/23 1149)  Bed Mobility Comments: NT- pt in chair pre and post tx. (05/11/23 0957)  Transfers:  Transfers  Sit to Stand: Stand by assistance (05/11/23 0957)  Stand to Sit: Stand by assistance (05/11/23 0957)  Bed to Chair: Contact guard assistance (05/10/23 1150)  Comment: With [employer]. Pt with improved safety and technique this session. Occasional impulsive movements. (05/11/23 0957)  Gait:   Ambulation  Surface: Level tile (05/11/23 0958)  Device: Rolling Walker (05/11/23 7210)  Assistance: Stand by assistance;Contact guard assistance (05/11/23 1617)  Quality of Gait: Staggering at times. Decreased safety awareness. WBOS with shortened step length. Slow pacing. (05/10/23 1151)  Gait Deviations: Slow Diana;Decreased step length;Decreased step height; Increased TOM (05/11/23 0958)  Distance: 120ft with turns (05/11/23 0958)  Comments: Increased time to complete. WW used to improve stability. (05/11/23 1741)  Ambulation 2  Surface - 2: level tile (05/10/23 1151)  Device 2: Rolling Walker (05/10/23 1151)  Quality of Gait 2: forward flexed. educated on changing walker height pt reports doesn't normally use. (05/10/23 1151)  Distance: 140' (05/10/23 1151)  Stairs:     W/C mobility:         Occupational Therapy  Hand Dominance: Right  ADL  Feeding: Modified independent  (05/11/23 1115)  Grooming: Stand by assistance (05/09/23 1044)  UE Bathing: Stand by assistance (05/09/23 1044)  LE Bathing: Moderate assistance (05/09/23 1044)  UE Dressing: Minimal assistance (05/11/23 1115)  UE Dressing Skilled Clinical Factors: assist to tie hospital gown behind neck (05/11/23 1115)  LE Dressing: Stand by assistance (05/11/23 1115)  LE Dressing Skilled Clinical Factors: Patient able to doff/don B non-slip hospital socks, able to pull up hopital brief as it began to fall off. (05/11/23 1113)  Toileting:  Moderate assistance (05/09/23 1045)  Additional Comments: simulated ADL

## 2023-05-12 NOTE — DISCHARGE SUMMARY
Central lucency favored to represent pulmonary cyst rather than cavitation. Critical results were called by Dr. Eddie Isaacs MD to Rhiannon Rowe NP on 5/8/2023 at 04:18. RECOMMENDATIONS: Unavailable     CT CERVICAL SPINE WO CONTRAST    Result Date: 5/8/2023  EXAMINATION: CT OF THE HEAD WITHOUT CONTRAST; CT OF THE CERVICAL SPINE WITHOUT CONTRAST 5/8/2023 2:12 am TECHNIQUE: CT of the head was performed without the administration of intravenous contrast. Automated exposure control, iterative reconstruction, and/or weight based adjustment of the mA/kV was utilized to reduce the radiation dose to as low as reasonably achievable.; CT of the cervical spine was performed without the administration of intravenous contrast. Multiplanar reformatted images are provided for review. Automated exposure control, iterative reconstruction, and/or weight based adjustment of the mA/kV was utilized to reduce the radiation dose to as low as reasonably achievable. COMPARISON: None. HISTORY: ORDERING SYSTEM PROVIDED HISTORY: fall TECHNOLOGIST PROVIDED HISTORY: Reason for exam:->fall Has a \"code stroke\" or \"stroke alert\" been called? ->No Decision Support Exception - unselect if not a suspected or confirmed emergency medical condition->Emergency Medical Condition (MA) What reading provider will be dictating this exam?->CRC; ORDERING SYSTEM PROVIDED HISTORY: fall TECHNOLOGIST PROVIDED HISTORY: Reason for exam:->fall Decision Support Exception - unselect if not a suspected or confirmed emergency medical condition->Emergency Medical Condition (MA) What reading provider will be dictating this exam?->CRC FINDINGS: CT head: BRAIN/VENTRICLES: There is no acute intracranial hemorrhage, mass effect or midline shift. No abnormal extra-axial fluid collection. The gray-white differentiation is maintained without evidence of an acute infarct. There is no evidence of hydrocephalus.  ORBITS: The visualized portion of the orbits demonstrate no acute

## 2023-05-12 NOTE — PLAN OF CARE
Problem: Discharge Planning  Goal: Discharge to home or other facility with appropriate resources  Outcome: Progressing  Flowsheets (Taken 5/11/2023 1008 by Andres Azar RN)  Discharge to home or other facility with appropriate resources:   Identify barriers to discharge with patient and caregiver   Arrange for needed discharge resources and transportation as appropriate   Identify discharge learning needs (meds, wound care, etc)     Problem: Safety - Adult  Goal: Free from fall injury  Outcome: Progressing

## 2023-05-13 ENCOUNTER — APPOINTMENT (OUTPATIENT)
Dept: ULTRASOUND IMAGING | Age: 88
DRG: 175 | End: 2023-05-13
Attending: PHYSICAL MEDICINE & REHABILITATION
Payer: MEDICARE

## 2023-05-13 PROBLEM — Z74.09 IMPAIRED MOBILITY AND ADLS: Status: ACTIVE | Noted: 2023-05-13

## 2023-05-13 PROBLEM — N39.0 URINARY TRACT INFECTION WITHOUT HEMATURIA: Status: ACTIVE | Noted: 2023-05-13

## 2023-05-13 PROBLEM — Z78.9 IMPAIRED MOBILITY AND ADLS: Status: ACTIVE | Noted: 2023-05-13

## 2023-05-13 LAB
ALBUMIN SERPL-MCNC: 2.2 G/DL (ref 3.5–4.6)
ANION GAP SERPL CALCULATED.3IONS-SCNC: 11 MEQ/L (ref 9–15)
ANISOCYTOSIS BLD QL SMEAR: ABNORMAL
BACTERIA BLD CULT ORG #2: NORMAL
BACTERIA BLD CULT: NORMAL
BASOPHILS # BLD: 0 K/UL (ref 0–0.2)
BASOPHILS NFR BLD: 0.2 %
BUN SERPL-MCNC: 14 MG/DL (ref 8–23)
CALCIUM SERPL-MCNC: 8.2 MG/DL (ref 8.5–9.9)
CHLORIDE SERPL-SCNC: 102 MEQ/L (ref 95–107)
CO2 SERPL-SCNC: 23 MEQ/L (ref 20–31)
CREAT SERPL-MCNC: 0.59 MG/DL (ref 0.7–1.2)
EOSINOPHIL # BLD: 0.6 K/UL (ref 0–0.7)
EOSINOPHIL NFR BLD: 3 %
ERYTHROCYTE [DISTWIDTH] IN BLOOD BY AUTOMATED COUNT: 13.4 % (ref 11.5–14.5)
GLUCOSE SERPL-MCNC: 126 MG/DL (ref 70–99)
HCT VFR BLD AUTO: 34.4 % (ref 42–52)
HGB BLD-MCNC: 11.6 G/DL (ref 14–18)
LYMPHOCYTES # BLD: 2.1 K/UL (ref 1–4.8)
LYMPHOCYTES NFR BLD: 11 %
MCH RBC QN AUTO: 32.2 PG (ref 27–31.3)
MCHC RBC AUTO-ENTMCNC: 33.8 % (ref 33–37)
MCV RBC AUTO: 95.4 FL (ref 79–92.2)
MONOCYTES # BLD: 0.8 K/UL (ref 0.2–0.8)
MONOCYTES NFR BLD: 4 %
MYELOCYTES NFR BLD MANUAL: 1 %
NEUTROPHILS # BLD: 15.7 K/UL (ref 1.4–6.5)
NEUTS BAND NFR BLD MANUAL: 9 %
NEUTS SEG NFR BLD: 72 %
PHOSPHATE SERPL-MCNC: 2.6 MG/DL (ref 2.3–4.8)
PLATELET # BLD AUTO: 325 K/UL (ref 130–400)
POTASSIUM SERPL-SCNC: 3.7 MEQ/L (ref 3.4–4.9)
RBC # BLD AUTO: 3.61 M/UL (ref 4.7–6.1)
SODIUM SERPL-SCNC: 136 MEQ/L (ref 135–144)
WBC # BLD AUTO: 19.1 K/UL (ref 4.8–10.8)

## 2023-05-13 PROCEDURE — 92523 SPEECH SOUND LANG COMPREHEN: CPT

## 2023-05-13 PROCEDURE — 97166 OT EVAL MOD COMPLEX 45 MIN: CPT

## 2023-05-13 PROCEDURE — 93970 EXTREMITY STUDY: CPT

## 2023-05-13 PROCEDURE — 2580000003 HC RX 258: Performed by: INTERNAL MEDICINE

## 2023-05-13 PROCEDURE — 97116 GAIT TRAINING THERAPY: CPT

## 2023-05-13 PROCEDURE — 36415 COLL VENOUS BLD VENIPUNCTURE: CPT

## 2023-05-13 PROCEDURE — 80069 RENAL FUNCTION PANEL: CPT

## 2023-05-13 PROCEDURE — 97110 THERAPEUTIC EXERCISES: CPT

## 2023-05-13 PROCEDURE — 6360000002 HC RX W HCPCS: Performed by: INTERNAL MEDICINE

## 2023-05-13 PROCEDURE — 94640 AIRWAY INHALATION TREATMENT: CPT

## 2023-05-13 PROCEDURE — 85025 COMPLETE CBC W/AUTO DIFF WBC: CPT

## 2023-05-13 PROCEDURE — 94761 N-INVAS EAR/PLS OXIMETRY MLT: CPT

## 2023-05-13 PROCEDURE — 6370000000 HC RX 637 (ALT 250 FOR IP): Performed by: INTERNAL MEDICINE

## 2023-05-13 PROCEDURE — 92610 EVALUATE SWALLOWING FUNCTION: CPT

## 2023-05-13 PROCEDURE — 6370000000 HC RX 637 (ALT 250 FOR IP): Performed by: REGISTERED NURSE

## 2023-05-13 PROCEDURE — 99232 SBSQ HOSP IP/OBS MODERATE 35: CPT | Performed by: INTERNAL MEDICINE

## 2023-05-13 PROCEDURE — 99222 1ST HOSP IP/OBS MODERATE 55: CPT | Performed by: INTERNAL MEDICINE

## 2023-05-13 PROCEDURE — 97162 PT EVAL MOD COMPLEX 30 MIN: CPT

## 2023-05-13 PROCEDURE — 1180000000 HC REHAB R&B

## 2023-05-13 RX ORDER — ACETAMINOPHEN 325 MG/1
650 TABLET ORAL EVERY 4 HOURS PRN
Status: DISCONTINUED | OUTPATIENT
Start: 2023-05-13 | End: 2023-05-23 | Stop reason: HOSPADM

## 2023-05-13 RX ORDER — LISINOPRIL 5 MG/1
5 TABLET ORAL ONCE
Status: COMPLETED | OUTPATIENT
Start: 2023-05-13 | End: 2023-05-13

## 2023-05-13 RX ORDER — BISACODYL 10 MG
10 SUPPOSITORY, RECTAL RECTAL DAILY PRN
Status: DISCONTINUED | OUTPATIENT
Start: 2023-05-13 | End: 2023-05-23 | Stop reason: HOSPADM

## 2023-05-13 RX ORDER — SODIUM PHOSPHATE, DIBASIC AND SODIUM PHOSPHATE, MONOBASIC 7; 19 G/133ML; G/133ML
1 ENEMA RECTAL DAILY PRN
Status: DISCONTINUED | OUTPATIENT
Start: 2023-05-13 | End: 2023-05-23 | Stop reason: HOSPADM

## 2023-05-13 RX ORDER — TRAMADOL HYDROCHLORIDE 50 MG/1
50 TABLET ORAL EVERY 6 HOURS PRN
Status: DISCONTINUED | OUTPATIENT
Start: 2023-05-13 | End: 2023-05-16

## 2023-05-13 RX ORDER — LISINOPRIL 10 MG/1
10 TABLET ORAL DAILY
Status: DISCONTINUED | OUTPATIENT
Start: 2023-05-14 | End: 2023-05-23 | Stop reason: HOSPADM

## 2023-05-13 RX ADMIN — Medication 10 ML: at 20:18

## 2023-05-13 RX ADMIN — APIXABAN 5 MG: 5 TABLET, FILM COATED ORAL at 20:12

## 2023-05-13 RX ADMIN — ATORVASTATIN CALCIUM 10 MG: 10 TABLET, FILM COATED ORAL at 20:12

## 2023-05-13 RX ADMIN — TRAMADOL HYDROCHLORIDE 50 MG: 50 TABLET, COATED ORAL at 20:16

## 2023-05-13 RX ADMIN — MONTELUKAST SODIUM 10 MG: 10 TABLET, FILM COATED ORAL at 20:12

## 2023-05-13 RX ADMIN — ASPIRIN 81 MG: 81 TABLET, COATED ORAL at 08:57

## 2023-05-13 RX ADMIN — CEFTRIAXONE SODIUM 1000 MG: 1 INJECTION, POWDER, FOR SOLUTION INTRAMUSCULAR; INTRAVENOUS at 14:07

## 2023-05-13 RX ADMIN — Medication 10 ML: at 09:00

## 2023-05-13 RX ADMIN — MENTHOL AND METHYL SALICYLATE: 7.6; 29 OINTMENT TOPICAL at 20:18

## 2023-05-13 RX ADMIN — TRAMADOL HYDROCHLORIDE 50 MG: 50 TABLET, COATED ORAL at 14:07

## 2023-05-13 RX ADMIN — ALBUTEROL SULFATE 2.5 MG: 2.5 SOLUTION RESPIRATORY (INHALATION) at 14:20

## 2023-05-13 RX ADMIN — FINASTERIDE 5 MG: 5 TABLET, FILM COATED ORAL at 08:57

## 2023-05-13 RX ADMIN — CARVEDILOL 6.25 MG: 6.25 TABLET, FILM COATED ORAL at 18:36

## 2023-05-13 RX ADMIN — CARVEDILOL 6.25 MG: 6.25 TABLET, FILM COATED ORAL at 08:59

## 2023-05-13 RX ADMIN — LISINOPRIL 5 MG: 5 TABLET ORAL at 12:14

## 2023-05-13 RX ADMIN — FLUTICASONE PROPIONATE 1 SPRAY: 50 SPRAY, METERED NASAL at 09:00

## 2023-05-13 RX ADMIN — LISINOPRIL 5 MG: 5 TABLET ORAL at 08:59

## 2023-05-13 RX ADMIN — BUPROPION HYDROCHLORIDE 100 MG: 100 TABLET, EXTENDED RELEASE ORAL at 08:57

## 2023-05-13 RX ADMIN — ALBUTEROL SULFATE 2.5 MG: 2.5 SOLUTION RESPIRATORY (INHALATION) at 05:30

## 2023-05-13 RX ADMIN — ISOSORBIDE MONONITRATE 60 MG: 60 TABLET, EXTENDED RELEASE ORAL at 08:57

## 2023-05-13 RX ADMIN — POTASSIUM CHLORIDE 40 MEQ: 1500 TABLET, EXTENDED RELEASE ORAL at 08:57

## 2023-05-13 RX ADMIN — APIXABAN 5 MG: 5 TABLET, FILM COATED ORAL at 08:57

## 2023-05-13 ASSESSMENT — PAIN DESCRIPTION - DESCRIPTORS
DESCRIPTORS: BURNING
DESCRIPTORS: SORE;ACHING

## 2023-05-13 ASSESSMENT — ENCOUNTER SYMPTOMS
GASTROINTESTINAL NEGATIVE: 1
RESPIRATORY NEGATIVE: 1

## 2023-05-13 ASSESSMENT — PAIN DESCRIPTION - ORIENTATION
ORIENTATION: LEFT;LOWER
ORIENTATION: RIGHT

## 2023-05-13 ASSESSMENT — PAIN DESCRIPTION - LOCATION
LOCATION: LEG
LOCATION: SHOULDER

## 2023-05-13 ASSESSMENT — PAIN SCALES - GENERAL
PAINLEVEL_OUTOF10: 7
PAINLEVEL_OUTOF10: 6
PAINLEVEL_OUTOF10: 4

## 2023-05-14 LAB
ALBUMIN SERPL-MCNC: 2.3 G/DL (ref 3.5–4.6)
ANION GAP SERPL CALCULATED.3IONS-SCNC: 11 MEQ/L (ref 9–15)
BASOPHILS # BLD: 0 K/UL (ref 0–0.2)
BASOPHILS NFR BLD: 0 %
BUN SERPL-MCNC: 12 MG/DL (ref 8–23)
CALCIUM SERPL-MCNC: 8.2 MG/DL (ref 8.5–9.9)
CHLORIDE SERPL-SCNC: 102 MEQ/L (ref 95–107)
CO2 SERPL-SCNC: 22 MEQ/L (ref 20–31)
CREAT SERPL-MCNC: 0.53 MG/DL (ref 0.7–1.2)
EOSINOPHIL # BLD: 0 K/UL (ref 0–0.7)
EOSINOPHIL NFR BLD: 0 %
ERYTHROCYTE [DISTWIDTH] IN BLOOD BY AUTOMATED COUNT: 13.6 % (ref 11.5–14.5)
GLUCOSE SERPL-MCNC: 108 MG/DL (ref 70–99)
HCT VFR BLD AUTO: 36.5 % (ref 42–52)
HGB BLD-MCNC: 12.3 G/DL (ref 14–18)
LYMPHOCYTES # BLD: 5.5 K/UL (ref 1–4.8)
LYMPHOCYTES NFR BLD: 27 %
MCH RBC QN AUTO: 32.2 PG (ref 27–31.3)
MCHC RBC AUTO-ENTMCNC: 33.6 % (ref 33–37)
MCV RBC AUTO: 95.8 FL (ref 79–92.2)
MONOCYTES # BLD: 1.6 K/UL (ref 0.2–0.8)
MONOCYTES NFR BLD: 8 %
MYELOCYTES NFR BLD MANUAL: 1 %
NEUTROPHILS # BLD: 12.5 K/UL (ref 1.4–6.5)
NEUTS SEG NFR BLD: 63 %
PHOSPHATE SERPL-MCNC: 3.1 MG/DL (ref 2.3–4.8)
PLATELET # BLD AUTO: 393 K/UL (ref 130–400)
PLATELET BLD QL SMEAR: ADEQUATE
POTASSIUM SERPL-SCNC: 4.2 MEQ/L (ref 3.4–4.9)
RBC # BLD AUTO: 3.81 M/UL (ref 4.7–6.1)
RBC MORPH BLD: NORMAL
SODIUM SERPL-SCNC: 135 MEQ/L (ref 135–144)
VARIANT LYMPHS NFR BLD: 1 %
WBC # BLD AUTO: 19.6 K/UL (ref 4.8–10.8)

## 2023-05-14 PROCEDURE — 2580000003 HC RX 258: Performed by: INTERNAL MEDICINE

## 2023-05-14 PROCEDURE — 36415 COLL VENOUS BLD VENIPUNCTURE: CPT

## 2023-05-14 PROCEDURE — 94640 AIRWAY INHALATION TREATMENT: CPT

## 2023-05-14 PROCEDURE — 99232 SBSQ HOSP IP/OBS MODERATE 35: CPT | Performed by: INTERNAL MEDICINE

## 2023-05-14 PROCEDURE — 6370000000 HC RX 637 (ALT 250 FOR IP): Performed by: INTERNAL MEDICINE

## 2023-05-14 PROCEDURE — 80069 RENAL FUNCTION PANEL: CPT

## 2023-05-14 PROCEDURE — 85025 COMPLETE CBC W/AUTO DIFF WBC: CPT

## 2023-05-14 PROCEDURE — 6360000002 HC RX W HCPCS: Performed by: INTERNAL MEDICINE

## 2023-05-14 PROCEDURE — 1180000000 HC REHAB R&B

## 2023-05-14 PROCEDURE — 6370000000 HC RX 637 (ALT 250 FOR IP): Performed by: PHYSICAL MEDICINE & REHABILITATION

## 2023-05-14 PROCEDURE — 94761 N-INVAS EAR/PLS OXIMETRY MLT: CPT

## 2023-05-14 PROCEDURE — 6370000000 HC RX 637 (ALT 250 FOR IP): Performed by: REGISTERED NURSE

## 2023-05-14 RX ORDER — FUROSEMIDE 20 MG/1
20 TABLET ORAL DAILY
Status: DISCONTINUED | OUTPATIENT
Start: 2023-05-14 | End: 2023-05-23 | Stop reason: HOSPADM

## 2023-05-14 RX ORDER — GUAIFENESIN/DEXTROMETHORPHAN 100-10MG/5
5 SYRUP ORAL EVERY 4 HOURS PRN
Status: DISCONTINUED | OUTPATIENT
Start: 2023-05-14 | End: 2023-05-23 | Stop reason: HOSPADM

## 2023-05-14 RX ADMIN — ALBUTEROL SULFATE 2.5 MG: 2.5 SOLUTION RESPIRATORY (INHALATION) at 04:53

## 2023-05-14 RX ADMIN — CARVEDILOL 6.25 MG: 6.25 TABLET, FILM COATED ORAL at 08:20

## 2023-05-14 RX ADMIN — TRAMADOL HYDROCHLORIDE 50 MG: 50 TABLET, COATED ORAL at 14:49

## 2023-05-14 RX ADMIN — MENTHOL AND METHYL SALICYLATE: 7.6; 29 OINTMENT TOPICAL at 08:21

## 2023-05-14 RX ADMIN — FINASTERIDE 5 MG: 5 TABLET, FILM COATED ORAL at 08:20

## 2023-05-14 RX ADMIN — MONTELUKAST SODIUM 10 MG: 10 TABLET, FILM COATED ORAL at 19:36

## 2023-05-14 RX ADMIN — FUROSEMIDE 20 MG: 20 TABLET ORAL at 18:19

## 2023-05-14 RX ADMIN — LISINOPRIL 10 MG: 10 TABLET ORAL at 08:20

## 2023-05-14 RX ADMIN — MENTHOL AND METHYL SALICYLATE: 7.6; 29 OINTMENT TOPICAL at 19:36

## 2023-05-14 RX ADMIN — APIXABAN 5 MG: 5 TABLET, FILM COATED ORAL at 08:20

## 2023-05-14 RX ADMIN — Medication 10 ML: at 15:25

## 2023-05-14 RX ADMIN — TRAMADOL HYDROCHLORIDE 50 MG: 50 TABLET, COATED ORAL at 01:26

## 2023-05-14 RX ADMIN — ALBUTEROL SULFATE 2.5 MG: 2.5 SOLUTION RESPIRATORY (INHALATION) at 16:14

## 2023-05-14 RX ADMIN — BUPROPION HYDROCHLORIDE 100 MG: 100 TABLET, EXTENDED RELEASE ORAL at 08:20

## 2023-05-14 RX ADMIN — APIXABAN 5 MG: 5 TABLET, FILM COATED ORAL at 19:36

## 2023-05-14 RX ADMIN — CEFTRIAXONE SODIUM 1000 MG: 1 INJECTION, POWDER, FOR SOLUTION INTRAMUSCULAR; INTRAVENOUS at 14:54

## 2023-05-14 RX ADMIN — ASPIRIN 81 MG: 81 TABLET, COATED ORAL at 08:20

## 2023-05-14 RX ADMIN — ATORVASTATIN CALCIUM 10 MG: 10 TABLET, FILM COATED ORAL at 19:36

## 2023-05-14 RX ADMIN — GUAIFENESIN SYRUP AND DEXTROMETHORPHAN 5 ML: 100; 10 SYRUP ORAL at 23:19

## 2023-05-14 RX ADMIN — TRAMADOL HYDROCHLORIDE 50 MG: 50 TABLET, COATED ORAL at 08:20

## 2023-05-14 RX ADMIN — CARVEDILOL 6.25 MG: 6.25 TABLET, FILM COATED ORAL at 18:19

## 2023-05-14 RX ADMIN — ALBUTEROL SULFATE 2.5 MG: 2.5 SOLUTION RESPIRATORY (INHALATION) at 11:09

## 2023-05-14 RX ADMIN — ISOSORBIDE MONONITRATE 60 MG: 60 TABLET, EXTENDED RELEASE ORAL at 08:20

## 2023-05-14 RX ADMIN — Medication 10 ML: at 19:37

## 2023-05-14 RX ADMIN — FLUTICASONE PROPIONATE 1 SPRAY: 50 SPRAY, METERED NASAL at 08:21

## 2023-05-14 RX ADMIN — POTASSIUM CHLORIDE 40 MEQ: 1500 TABLET, EXTENDED RELEASE ORAL at 08:20

## 2023-05-14 ASSESSMENT — PAIN DESCRIPTION - ORIENTATION
ORIENTATION: LEFT
ORIENTATION: LEFT

## 2023-05-14 ASSESSMENT — ENCOUNTER SYMPTOMS
RESPIRATORY NEGATIVE: 1
GASTROINTESTINAL NEGATIVE: 1

## 2023-05-14 ASSESSMENT — PAIN SCALES - GENERAL
PAINLEVEL_OUTOF10: 7
PAINLEVEL_OUTOF10: 4
PAINLEVEL_OUTOF10: 4

## 2023-05-14 ASSESSMENT — PAIN DESCRIPTION - LOCATION
LOCATION: LEG
LOCATION: LEG

## 2023-05-15 PROBLEM — Z74.09 IMPAIRED MOBILITY AND ACTIVITIES OF DAILY LIVING: Status: RESOLVED | Noted: 2023-05-11 | Resolved: 2023-05-15

## 2023-05-15 PROBLEM — Z78.9 IMPAIRED MOBILITY AND ACTIVITIES OF DAILY LIVING: Status: RESOLVED | Noted: 2023-05-11 | Resolved: 2023-05-15

## 2023-05-15 LAB
ALBUMIN SERPL-MCNC: 2.1 G/DL (ref 3.5–4.6)
ANION GAP SERPL CALCULATED.3IONS-SCNC: 10 MEQ/L (ref 9–15)
BASOPHILS # BLD: 0.1 K/UL (ref 0–0.2)
BASOPHILS NFR BLD: 0.3 %
BNP BLD-MCNC: 269 PG/ML
BUN SERPL-MCNC: 10 MG/DL (ref 8–23)
CALCIUM SERPL-MCNC: 8.1 MG/DL (ref 8.5–9.9)
CHLORIDE SERPL-SCNC: 98 MEQ/L (ref 95–107)
CO2 SERPL-SCNC: 22 MEQ/L (ref 20–31)
CREAT SERPL-MCNC: 0.51 MG/DL (ref 0.7–1.2)
EOSINOPHIL # BLD: 0.4 K/UL (ref 0–0.7)
EOSINOPHIL NFR BLD: 1.7 %
ERYTHROCYTE [DISTWIDTH] IN BLOOD BY AUTOMATED COUNT: 13.5 % (ref 11.5–14.5)
GLUCOSE SERPL-MCNC: 112 MG/DL (ref 70–99)
HCT VFR BLD AUTO: 33.9 % (ref 42–52)
HGB BLD-MCNC: 11.9 G/DL (ref 14–18)
LYMPHOCYTES # BLD: 6.3 K/UL (ref 1–4.8)
LYMPHOCYTES NFR BLD: 26.3 %
MCH RBC QN AUTO: 33.5 PG (ref 27–31.3)
MCHC RBC AUTO-ENTMCNC: 35.2 % (ref 33–37)
MCV RBC AUTO: 95.3 FL (ref 79–92.2)
MONOCYTES # BLD: 1.4 K/UL (ref 0.2–0.8)
MONOCYTES NFR BLD: 5.7 %
NEUTROPHILS # BLD: 15.8 K/UL (ref 1.4–6.5)
NEUTS SEG NFR BLD: 66 %
PHOSPHATE SERPL-MCNC: 3.3 MG/DL (ref 2.3–4.8)
PLATELET # BLD AUTO: 414 K/UL (ref 130–400)
POTASSIUM SERPL-SCNC: 4.3 MEQ/L (ref 3.4–4.9)
PROCALCITONIN SERPL IA-MCNC: 0.13 NG/ML (ref 0–0.15)
PROCALCITONIN SERPL IA-MCNC: 0.13 NG/ML (ref 0–0.15)
RBC # BLD AUTO: 3.56 M/UL (ref 4.7–6.1)
SODIUM SERPL-SCNC: 130 MEQ/L (ref 135–144)
WBC # BLD AUTO: 24 K/UL (ref 4.8–10.8)

## 2023-05-15 PROCEDURE — 6360000002 HC RX W HCPCS: Performed by: INTERNAL MEDICINE

## 2023-05-15 PROCEDURE — 99233 SBSQ HOSP IP/OBS HIGH 50: CPT | Performed by: PHYSICAL MEDICINE & REHABILITATION

## 2023-05-15 PROCEDURE — 36415 COLL VENOUS BLD VENIPUNCTURE: CPT

## 2023-05-15 PROCEDURE — 99232 SBSQ HOSP IP/OBS MODERATE 35: CPT | Performed by: INTERNAL MEDICINE

## 2023-05-15 PROCEDURE — 97535 SELF CARE MNGMENT TRAINING: CPT

## 2023-05-15 PROCEDURE — 6370000000 HC RX 637 (ALT 250 FOR IP): Performed by: REGISTERED NURSE

## 2023-05-15 PROCEDURE — 84145 PROCALCITONIN (PCT): CPT

## 2023-05-15 PROCEDURE — 94664 DEMO&/EVAL PT USE INHALER: CPT

## 2023-05-15 PROCEDURE — 97530 THERAPEUTIC ACTIVITIES: CPT

## 2023-05-15 PROCEDURE — 94760 N-INVAS EAR/PLS OXIMETRY 1: CPT

## 2023-05-15 PROCEDURE — 2580000003 HC RX 258: Performed by: INTERNAL MEDICINE

## 2023-05-15 PROCEDURE — 94761 N-INVAS EAR/PLS OXIMETRY MLT: CPT

## 2023-05-15 PROCEDURE — 97116 GAIT TRAINING THERAPY: CPT

## 2023-05-15 PROCEDURE — 80069 RENAL FUNCTION PANEL: CPT

## 2023-05-15 PROCEDURE — 94640 AIRWAY INHALATION TREATMENT: CPT

## 2023-05-15 PROCEDURE — 6370000000 HC RX 637 (ALT 250 FOR IP): Performed by: PHYSICAL MEDICINE & REHABILITATION

## 2023-05-15 PROCEDURE — 97110 THERAPEUTIC EXERCISES: CPT

## 2023-05-15 PROCEDURE — 97129 THER IVNTJ 1ST 15 MIN: CPT

## 2023-05-15 PROCEDURE — 6370000000 HC RX 637 (ALT 250 FOR IP): Performed by: INTERNAL MEDICINE

## 2023-05-15 PROCEDURE — 85025 COMPLETE CBC W/AUTO DIFF WBC: CPT

## 2023-05-15 PROCEDURE — 97130 THER IVNTJ EA ADDL 15 MIN: CPT

## 2023-05-15 PROCEDURE — 1180000000 HC REHAB R&B

## 2023-05-15 PROCEDURE — 83880 ASSAY OF NATRIURETIC PEPTIDE: CPT

## 2023-05-15 RX ORDER — BUDESONIDE 0.5 MG/2ML
0.5 INHALANT ORAL 2 TIMES DAILY
Status: DISCONTINUED | OUTPATIENT
Start: 2023-05-15 | End: 2023-05-15

## 2023-05-15 RX ORDER — BUDESONIDE 0.5 MG/2ML
0.5 INHALANT ORAL 2 TIMES DAILY
Status: DISCONTINUED | OUTPATIENT
Start: 2023-05-15 | End: 2023-05-19 | Stop reason: RX

## 2023-05-15 RX ADMIN — MONTELUKAST SODIUM 10 MG: 10 TABLET, FILM COATED ORAL at 20:19

## 2023-05-15 RX ADMIN — ISOSORBIDE MONONITRATE 60 MG: 60 TABLET, EXTENDED RELEASE ORAL at 08:46

## 2023-05-15 RX ADMIN — TRAMADOL HYDROCHLORIDE 50 MG: 50 TABLET, COATED ORAL at 20:18

## 2023-05-15 RX ADMIN — BUPROPION HYDROCHLORIDE 100 MG: 100 TABLET, EXTENDED RELEASE ORAL at 08:46

## 2023-05-15 RX ADMIN — ATORVASTATIN CALCIUM 10 MG: 10 TABLET, FILM COATED ORAL at 20:18

## 2023-05-15 RX ADMIN — ALBUTEROL SULFATE 2.5 MG: 2.5 SOLUTION RESPIRATORY (INHALATION) at 04:21

## 2023-05-15 RX ADMIN — FINASTERIDE 5 MG: 5 TABLET, FILM COATED ORAL at 08:46

## 2023-05-15 RX ADMIN — GUAIFENESIN SYRUP AND DEXTROMETHORPHAN 5 ML: 100; 10 SYRUP ORAL at 08:55

## 2023-05-15 RX ADMIN — ALBUTEROL SULFATE 2.5 MG: 2.5 SOLUTION RESPIRATORY (INHALATION) at 16:00

## 2023-05-15 RX ADMIN — CEFTRIAXONE SODIUM 1000 MG: 1 INJECTION, POWDER, FOR SOLUTION INTRAMUSCULAR; INTRAVENOUS at 20:23

## 2023-05-15 RX ADMIN — CARVEDILOL 6.25 MG: 6.25 TABLET, FILM COATED ORAL at 08:46

## 2023-05-15 RX ADMIN — ASPIRIN 81 MG: 81 TABLET, COATED ORAL at 08:46

## 2023-05-15 RX ADMIN — CARVEDILOL 6.25 MG: 6.25 TABLET, FILM COATED ORAL at 17:40

## 2023-05-15 RX ADMIN — ACETAMINOPHEN 650 MG: 325 TABLET ORAL at 20:19

## 2023-05-15 RX ADMIN — FUROSEMIDE 20 MG: 20 TABLET ORAL at 08:47

## 2023-05-15 RX ADMIN — ALBUTEROL SULFATE 2.5 MG: 2.5 SOLUTION RESPIRATORY (INHALATION) at 11:01

## 2023-05-15 RX ADMIN — Medication 10 ML: at 20:19

## 2023-05-15 RX ADMIN — FLUTICASONE PROPIONATE 1 SPRAY: 50 SPRAY, METERED NASAL at 08:46

## 2023-05-15 RX ADMIN — BUDESONIDE 500 MCG: 0.5 INHALANT ORAL at 16:00

## 2023-05-15 RX ADMIN — Medication 10 ML: at 14:58

## 2023-05-15 RX ADMIN — TRAMADOL HYDROCHLORIDE 50 MG: 50 TABLET, COATED ORAL at 13:13

## 2023-05-15 RX ADMIN — LISINOPRIL 10 MG: 10 TABLET ORAL at 08:47

## 2023-05-15 RX ADMIN — MENTHOL AND METHYL SALICYLATE: 7.6; 29 OINTMENT TOPICAL at 20:21

## 2023-05-15 RX ADMIN — APIXABAN 5 MG: 5 TABLET, FILM COATED ORAL at 08:46

## 2023-05-15 RX ADMIN — APIXABAN 5 MG: 5 TABLET, FILM COATED ORAL at 20:19

## 2023-05-15 RX ADMIN — POTASSIUM CHLORIDE 40 MEQ: 1500 TABLET, EXTENDED RELEASE ORAL at 08:46

## 2023-05-15 RX ADMIN — MENTHOL AND METHYL SALICYLATE: 7.6; 29 OINTMENT TOPICAL at 10:00

## 2023-05-15 RX ADMIN — GUAIFENESIN SYRUP AND DEXTROMETHORPHAN 5 ML: 100; 10 SYRUP ORAL at 21:04

## 2023-05-15 ASSESSMENT — PAIN - FUNCTIONAL ASSESSMENT: PAIN_FUNCTIONAL_ASSESSMENT_SITE2: PREVENTS OR INTERFERES SOME ACTIVE ACTIVITIES AND ADLS

## 2023-05-15 ASSESSMENT — PAIN DESCRIPTION - DESCRIPTORS
DESCRIPTORS_2: ACHING
DESCRIPTORS: THROBBING;STABBING;SHOOTING
DESCRIPTORS: ACHING;DISCOMFORT

## 2023-05-15 ASSESSMENT — PAIN DESCRIPTION - LOCATION
LOCATION: KNEE
LOCATION: LEG
LOCATION_2: SHOULDER

## 2023-05-15 ASSESSMENT — PAIN DESCRIPTION - INTENSITY: RATING_2: 5

## 2023-05-15 ASSESSMENT — PAIN SCALES - GENERAL
PAINLEVEL_OUTOF10: 7
PAINLEVEL_OUTOF10: 0
PAINLEVEL_OUTOF10: 5

## 2023-05-15 ASSESSMENT — PAIN DESCRIPTION - ORIENTATION
ORIENTATION_2: RIGHT
ORIENTATION: LEFT
ORIENTATION: LEFT

## 2023-05-16 ENCOUNTER — APPOINTMENT (OUTPATIENT)
Dept: NUCLEAR MEDICINE | Age: 88
DRG: 175 | End: 2023-05-16
Attending: PHYSICAL MEDICINE & REHABILITATION
Payer: MEDICARE

## 2023-05-16 ENCOUNTER — APPOINTMENT (OUTPATIENT)
Dept: GENERAL RADIOLOGY | Age: 88
DRG: 175 | End: 2023-05-16
Attending: PHYSICAL MEDICINE & REHABILITATION
Payer: MEDICARE

## 2023-05-16 LAB
ALBUMIN SERPL-MCNC: 2.2 G/DL (ref 3.5–4.6)
ALP SERPL-CCNC: 89 U/L (ref 35–104)
ALT SERPL-CCNC: 25 U/L (ref 0–41)
ANION GAP SERPL CALCULATED.3IONS-SCNC: 9 MEQ/L (ref 9–15)
AST SERPL-CCNC: 22 U/L (ref 0–40)
BASOPHILS # BLD: 0.1 K/UL (ref 0–0.2)
BASOPHILS NFR BLD: 0.3 %
BILIRUB SERPL-MCNC: 0.4 MG/DL (ref 0.2–0.7)
BUN SERPL-MCNC: 11 MG/DL (ref 8–23)
C-REACTIVE PROTEIN, HIGH SENSITIVITY: 191.2 MG/L (ref 0–5)
CALCIUM SERPL-MCNC: 8.4 MG/DL (ref 8.5–9.9)
CHLORIDE SERPL-SCNC: 100 MEQ/L (ref 95–107)
CO2 SERPL-SCNC: 24 MEQ/L (ref 20–31)
CREAT SERPL-MCNC: 0.6 MG/DL (ref 0.7–1.2)
EKG ATRIAL RATE: 74 BPM
EKG P AXIS: 16 DEGREES
EKG P-R INTERVAL: 176 MS
EKG Q-T INTERVAL: 402 MS
EKG QRS DURATION: 100 MS
EKG QTC CALCULATION (BAZETT): 446 MS
EKG R AXIS: 30 DEGREES
EKG T AXIS: 10 DEGREES
EKG VENTRICULAR RATE: 74 BPM
EOSINOPHIL # BLD: 0.4 K/UL (ref 0–0.7)
EOSINOPHIL NFR BLD: 1.6 %
ERYTHROCYTE [DISTWIDTH] IN BLOOD BY AUTOMATED COUNT: 13.6 % (ref 11.5–14.5)
ERYTHROCYTE [SEDIMENTATION RATE] IN BLOOD BY WESTERGREN METHOD: 110 MM (ref 0–20)
GLOBULIN SER CALC-MCNC: 3 G/DL (ref 2.3–3.5)
GLUCOSE SERPL-MCNC: 98 MG/DL (ref 70–99)
HCT VFR BLD AUTO: 34 % (ref 42–52)
HGB BLD-MCNC: 11.7 G/DL (ref 14–18)
LYMPHOCYTES # BLD: 6.6 K/UL (ref 1–4.8)
LYMPHOCYTES NFR BLD: 29.1 %
MAGNESIUM SERPL-MCNC: 2 MG/DL (ref 1.7–2.4)
MCH RBC QN AUTO: 32.6 PG (ref 27–31.3)
MCHC RBC AUTO-ENTMCNC: 34.4 % (ref 33–37)
MCV RBC AUTO: 94.8 FL (ref 79–92.2)
MONOCYTES # BLD: 1.2 K/UL (ref 0.2–0.8)
MONOCYTES NFR BLD: 5.2 %
NEUTROPHILS # BLD: 14.5 K/UL (ref 1.4–6.5)
NEUTS SEG NFR BLD: 63.8 %
PHOSPHATE SERPL-MCNC: 3.4 MG/DL (ref 2.3–4.8)
PLATELET # BLD AUTO: 456 K/UL (ref 130–400)
POTASSIUM SERPL-SCNC: 4.2 MEQ/L (ref 3.4–4.9)
PROT SERPL-MCNC: 5.2 G/DL (ref 6.3–8)
RBC # BLD AUTO: 3.58 M/UL (ref 4.7–6.1)
SODIUM SERPL-SCNC: 133 MEQ/L (ref 135–144)
TROPONIN T SERPL-MCNC: <0.01 NG/ML (ref 0–0.01)
WBC # BLD AUTO: 22.8 K/UL (ref 4.8–10.8)

## 2023-05-16 PROCEDURE — 78452 HT MUSCLE IMAGE SPECT MULT: CPT

## 2023-05-16 PROCEDURE — 6360000002 HC RX W HCPCS: Performed by: INTERNAL MEDICINE

## 2023-05-16 PROCEDURE — 94640 AIRWAY INHALATION TREATMENT: CPT

## 2023-05-16 PROCEDURE — 94761 N-INVAS EAR/PLS OXIMETRY MLT: CPT

## 2023-05-16 PROCEDURE — 2580000003 HC RX 258: Performed by: INTERNAL MEDICINE

## 2023-05-16 PROCEDURE — 97535 SELF CARE MNGMENT TRAINING: CPT

## 2023-05-16 PROCEDURE — 6370000000 HC RX 637 (ALT 250 FOR IP): Performed by: PHYSICAL MEDICINE & REHABILITATION

## 2023-05-16 PROCEDURE — 93005 ELECTROCARDIOGRAM TRACING: CPT | Performed by: INTERNAL MEDICINE

## 2023-05-16 PROCEDURE — 99232 SBSQ HOSP IP/OBS MODERATE 35: CPT | Performed by: PHYSICAL MEDICINE & REHABILITATION

## 2023-05-16 PROCEDURE — 84484 ASSAY OF TROPONIN QUANT: CPT

## 2023-05-16 PROCEDURE — 85025 COMPLETE CBC W/AUTO DIFF WBC: CPT

## 2023-05-16 PROCEDURE — 97116 GAIT TRAINING THERAPY: CPT

## 2023-05-16 PROCEDURE — 85652 RBC SED RATE AUTOMATED: CPT

## 2023-05-16 PROCEDURE — 84100 ASSAY OF PHOSPHORUS: CPT

## 2023-05-16 PROCEDURE — 6370000000 HC RX 637 (ALT 250 FOR IP): Performed by: INTERNAL MEDICINE

## 2023-05-16 PROCEDURE — A9502 TC99M TETROFOSMIN: HCPCS | Performed by: INTERNAL MEDICINE

## 2023-05-16 PROCEDURE — 86141 C-REACTIVE PROTEIN HS: CPT

## 2023-05-16 PROCEDURE — 99232 SBSQ HOSP IP/OBS MODERATE 35: CPT | Performed by: INTERNAL MEDICINE

## 2023-05-16 PROCEDURE — 36415 COLL VENOUS BLD VENIPUNCTURE: CPT

## 2023-05-16 PROCEDURE — 3430000000 HC RX DIAGNOSTIC RADIOPHARMACEUTICAL: Performed by: INTERNAL MEDICINE

## 2023-05-16 PROCEDURE — 97110 THERAPEUTIC EXERCISES: CPT

## 2023-05-16 PROCEDURE — 71046 X-RAY EXAM CHEST 2 VIEWS: CPT

## 2023-05-16 PROCEDURE — 1180000000 HC REHAB R&B

## 2023-05-16 PROCEDURE — 93017 CV STRESS TEST TRACING ONLY: CPT

## 2023-05-16 PROCEDURE — 6370000000 HC RX 637 (ALT 250 FOR IP): Performed by: REGISTERED NURSE

## 2023-05-16 PROCEDURE — 80053 COMPREHEN METABOLIC PANEL: CPT

## 2023-05-16 PROCEDURE — 83735 ASSAY OF MAGNESIUM: CPT

## 2023-05-16 RX ORDER — SODIUM CHLORIDE 0.9 % (FLUSH) 0.9 %
10 SYRINGE (ML) INJECTION PRN
Status: DISCONTINUED | OUTPATIENT
Start: 2023-05-16 | End: 2023-05-23 | Stop reason: HOSPADM

## 2023-05-16 RX ORDER — HYDROCODONE BITARTRATE AND ACETAMINOPHEN 5; 325 MG/1; MG/1
1 TABLET ORAL EVERY 4 HOURS PRN
Status: DISCONTINUED | OUTPATIENT
Start: 2023-05-16 | End: 2023-05-23 | Stop reason: HOSPADM

## 2023-05-16 RX ORDER — METHOCARBAMOL 500 MG/1
500 TABLET, FILM COATED ORAL 3 TIMES DAILY PRN
Status: DISCONTINUED | OUTPATIENT
Start: 2023-05-16 | End: 2023-05-23 | Stop reason: HOSPADM

## 2023-05-16 RX ADMIN — ALBUTEROL SULFATE 2.5 MG: 2.5 SOLUTION RESPIRATORY (INHALATION) at 04:32

## 2023-05-16 RX ADMIN — METHOCARBAMOL 500 MG: 500 TABLET ORAL at 21:23

## 2023-05-16 RX ADMIN — Medication 10 ML: at 11:33

## 2023-05-16 RX ADMIN — FLUTICASONE PROPIONATE 1 SPRAY: 50 SPRAY, METERED NASAL at 15:25

## 2023-05-16 RX ADMIN — REGADENOSON 0.4 MG: 0.08 INJECTION, SOLUTION INTRAVENOUS at 11:33

## 2023-05-16 RX ADMIN — TETROFOSMIN 11.2 MILLICURIE: 1.38 INJECTION, POWDER, LYOPHILIZED, FOR SOLUTION INTRAVENOUS at 10:00

## 2023-05-16 RX ADMIN — CEFTRIAXONE SODIUM 1000 MG: 1 INJECTION, POWDER, FOR SOLUTION INTRAMUSCULAR; INTRAVENOUS at 15:31

## 2023-05-16 RX ADMIN — ALBUTEROL SULFATE 2.5 MG: 2.5 SOLUTION RESPIRATORY (INHALATION) at 13:09

## 2023-05-16 RX ADMIN — MONTELUKAST SODIUM 10 MG: 10 TABLET, FILM COATED ORAL at 21:24

## 2023-05-16 RX ADMIN — Medication 10 ML: at 21:24

## 2023-05-16 RX ADMIN — ISOSORBIDE MONONITRATE 60 MG: 60 TABLET, EXTENDED RELEASE ORAL at 15:25

## 2023-05-16 RX ADMIN — ASPIRIN 81 MG: 81 TABLET, COATED ORAL at 15:25

## 2023-05-16 RX ADMIN — APIXABAN 5 MG: 5 TABLET, FILM COATED ORAL at 21:24

## 2023-05-16 RX ADMIN — ALBUTEROL SULFATE 2.5 MG: 2.5 SOLUTION RESPIRATORY (INHALATION) at 15:54

## 2023-05-16 RX ADMIN — BUPROPION HYDROCHLORIDE 100 MG: 100 TABLET, EXTENDED RELEASE ORAL at 15:25

## 2023-05-16 RX ADMIN — Medication 10 ML: at 10:00

## 2023-05-16 RX ADMIN — FUROSEMIDE 20 MG: 20 TABLET ORAL at 15:25

## 2023-05-16 RX ADMIN — POTASSIUM CHLORIDE 40 MEQ: 1500 TABLET, EXTENDED RELEASE ORAL at 15:25

## 2023-05-16 RX ADMIN — FINASTERIDE 5 MG: 5 TABLET, FILM COATED ORAL at 15:25

## 2023-05-16 RX ADMIN — CARVEDILOL 6.25 MG: 6.25 TABLET, FILM COATED ORAL at 17:23

## 2023-05-16 RX ADMIN — Medication 10 ML: at 11:34

## 2023-05-16 RX ADMIN — LISINOPRIL 10 MG: 10 TABLET ORAL at 15:25

## 2023-05-16 RX ADMIN — MENTHOL AND METHYL SALICYLATE: 7.6; 29 OINTMENT TOPICAL at 15:31

## 2023-05-16 RX ADMIN — ATORVASTATIN CALCIUM 10 MG: 10 TABLET, FILM COATED ORAL at 21:24

## 2023-05-16 RX ADMIN — TETROFOSMIN 29.7 MILLICURIE: 1.38 INJECTION, POWDER, LYOPHILIZED, FOR SOLUTION INTRAVENOUS at 11:33

## 2023-05-16 RX ADMIN — ACETAMINOPHEN 650 MG: 325 TABLET ORAL at 01:08

## 2023-05-16 RX ADMIN — HYDROCODONE BITARTRATE AND ACETAMINOPHEN 1 TABLET: 5; 325 TABLET ORAL at 21:24

## 2023-05-16 RX ADMIN — MENTHOL AND METHYL SALICYLATE: 7.6; 29 OINTMENT TOPICAL at 21:39

## 2023-05-16 ASSESSMENT — PAIN DESCRIPTION - ORIENTATION
ORIENTATION: LEFT
ORIENTATION: LEFT

## 2023-05-16 ASSESSMENT — PAIN SCALES - GENERAL
PAINLEVEL_OUTOF10: 5
PAINLEVEL_OUTOF10: 7
PAINLEVEL_OUTOF10: 6

## 2023-05-16 ASSESSMENT — PAIN DESCRIPTION - DESCRIPTORS
DESCRIPTORS: ACHING;DISCOMFORT
DESCRIPTORS: ACHING;DISCOMFORT

## 2023-05-16 ASSESSMENT — PAIN DESCRIPTION - LOCATION
LOCATION: LEG
LOCATION: LEG

## 2023-05-16 ASSESSMENT — PAIN DESCRIPTION - PAIN TYPE: TYPE: ACUTE PAIN

## 2023-05-17 PROBLEM — J18.9 PNEUMONIA WITH CAVITY OF LUNG: Status: ACTIVE | Noted: 2023-05-17

## 2023-05-17 PROBLEM — J98.4 PNEUMONIA WITH CAVITY OF LUNG: Status: ACTIVE | Noted: 2023-05-17

## 2023-05-17 PROCEDURE — 97535 SELF CARE MNGMENT TRAINING: CPT

## 2023-05-17 PROCEDURE — 6370000000 HC RX 637 (ALT 250 FOR IP): Performed by: INTERNAL MEDICINE

## 2023-05-17 PROCEDURE — 94640 AIRWAY INHALATION TREATMENT: CPT

## 2023-05-17 PROCEDURE — 94761 N-INVAS EAR/PLS OXIMETRY MLT: CPT

## 2023-05-17 PROCEDURE — 99232 SBSQ HOSP IP/OBS MODERATE 35: CPT | Performed by: INTERNAL MEDICINE

## 2023-05-17 PROCEDURE — 6360000002 HC RX W HCPCS: Performed by: INTERNAL MEDICINE

## 2023-05-17 PROCEDURE — 99232 SBSQ HOSP IP/OBS MODERATE 35: CPT | Performed by: PHYSICAL MEDICINE & REHABILITATION

## 2023-05-17 PROCEDURE — 97530 THERAPEUTIC ACTIVITIES: CPT

## 2023-05-17 PROCEDURE — 6370000000 HC RX 637 (ALT 250 FOR IP): Performed by: REGISTERED NURSE

## 2023-05-17 PROCEDURE — 97130 THER IVNTJ EA ADDL 15 MIN: CPT

## 2023-05-17 PROCEDURE — 6370000000 HC RX 637 (ALT 250 FOR IP): Performed by: PHYSICAL MEDICINE & REHABILITATION

## 2023-05-17 PROCEDURE — 97129 THER IVNTJ 1ST 15 MIN: CPT

## 2023-05-17 PROCEDURE — 2580000003 HC RX 258: Performed by: INTERNAL MEDICINE

## 2023-05-17 PROCEDURE — 97116 GAIT TRAINING THERAPY: CPT

## 2023-05-17 PROCEDURE — 97112 NEUROMUSCULAR REEDUCATION: CPT

## 2023-05-17 PROCEDURE — 1180000000 HC REHAB R&B

## 2023-05-17 RX ADMIN — FINASTERIDE 5 MG: 5 TABLET, FILM COATED ORAL at 09:24

## 2023-05-17 RX ADMIN — CEFTRIAXONE SODIUM 1000 MG: 1 INJECTION, POWDER, FOR SOLUTION INTRAMUSCULAR; INTRAVENOUS at 14:49

## 2023-05-17 RX ADMIN — Medication 10 ML: at 22:45

## 2023-05-17 RX ADMIN — ATORVASTATIN CALCIUM 10 MG: 10 TABLET, FILM COATED ORAL at 22:44

## 2023-05-17 RX ADMIN — HYDROCODONE BITARTRATE AND ACETAMINOPHEN 1 TABLET: 5; 325 TABLET ORAL at 14:51

## 2023-05-17 RX ADMIN — POTASSIUM CHLORIDE 40 MEQ: 1500 TABLET, EXTENDED RELEASE ORAL at 09:23

## 2023-05-17 RX ADMIN — ISOSORBIDE MONONITRATE 60 MG: 60 TABLET, EXTENDED RELEASE ORAL at 09:23

## 2023-05-17 RX ADMIN — MENTHOL AND METHYL SALICYLATE: 7.6; 29 OINTMENT TOPICAL at 22:46

## 2023-05-17 RX ADMIN — ALBUTEROL SULFATE 2.5 MG: 2.5 SOLUTION RESPIRATORY (INHALATION) at 14:37

## 2023-05-17 RX ADMIN — LISINOPRIL 10 MG: 10 TABLET ORAL at 09:23

## 2023-05-17 RX ADMIN — METHOCARBAMOL 500 MG: 500 TABLET ORAL at 22:45

## 2023-05-17 RX ADMIN — MENTHOL AND METHYL SALICYLATE: 7.6; 29 OINTMENT TOPICAL at 09:31

## 2023-05-17 RX ADMIN — MONTELUKAST SODIUM 10 MG: 10 TABLET, FILM COATED ORAL at 22:45

## 2023-05-17 RX ADMIN — ALBUTEROL SULFATE 2.5 MG: 2.5 SOLUTION RESPIRATORY (INHALATION) at 04:56

## 2023-05-17 RX ADMIN — APIXABAN 5 MG: 5 TABLET, FILM COATED ORAL at 09:24

## 2023-05-17 RX ADMIN — CARVEDILOL 6.25 MG: 6.25 TABLET, FILM COATED ORAL at 17:47

## 2023-05-17 RX ADMIN — ASPIRIN 81 MG: 81 TABLET, COATED ORAL at 09:24

## 2023-05-17 RX ADMIN — Medication 10 ML: at 09:31

## 2023-05-17 RX ADMIN — FUROSEMIDE 20 MG: 20 TABLET ORAL at 09:23

## 2023-05-17 RX ADMIN — CARVEDILOL 6.25 MG: 6.25 TABLET, FILM COATED ORAL at 09:24

## 2023-05-17 RX ADMIN — APIXABAN 5 MG: 5 TABLET, FILM COATED ORAL at 22:45

## 2023-05-17 RX ADMIN — BUPROPION HYDROCHLORIDE 100 MG: 100 TABLET, EXTENDED RELEASE ORAL at 09:23

## 2023-05-17 RX ADMIN — HYDROCODONE BITARTRATE AND ACETAMINOPHEN 1 TABLET: 5; 325 TABLET ORAL at 22:44

## 2023-05-17 ASSESSMENT — PAIN SCALES - GENERAL
PAINLEVEL_OUTOF10: 2
PAINLEVEL_OUTOF10: 8
PAINLEVEL_OUTOF10: 5

## 2023-05-17 ASSESSMENT — PAIN DESCRIPTION - ORIENTATION
ORIENTATION: LEFT

## 2023-05-17 ASSESSMENT — PAIN DESCRIPTION - DESCRIPTORS
DESCRIPTORS: ACHING

## 2023-05-17 ASSESSMENT — PAIN DESCRIPTION - LOCATION
LOCATION: SHOULDER
LOCATION: HIP
LOCATION: SHOULDER

## 2023-05-17 ASSESSMENT — PAIN DESCRIPTION - PAIN TYPE: TYPE: ACUTE PAIN

## 2023-05-18 PROCEDURE — 94761 N-INVAS EAR/PLS OXIMETRY MLT: CPT

## 2023-05-18 PROCEDURE — 6370000000 HC RX 637 (ALT 250 FOR IP): Performed by: REGISTERED NURSE

## 2023-05-18 PROCEDURE — 2580000003 HC RX 258: Performed by: INTERNAL MEDICINE

## 2023-05-18 PROCEDURE — 97116 GAIT TRAINING THERAPY: CPT

## 2023-05-18 PROCEDURE — 6360000002 HC RX W HCPCS: Performed by: INTERNAL MEDICINE

## 2023-05-18 PROCEDURE — 97535 SELF CARE MNGMENT TRAINING: CPT

## 2023-05-18 PROCEDURE — 94640 AIRWAY INHALATION TREATMENT: CPT

## 2023-05-18 PROCEDURE — 6370000000 HC RX 637 (ALT 250 FOR IP): Performed by: PHYSICAL MEDICINE & REHABILITATION

## 2023-05-18 PROCEDURE — 97129 THER IVNTJ 1ST 15 MIN: CPT

## 2023-05-18 PROCEDURE — 1180000000 HC REHAB R&B

## 2023-05-18 PROCEDURE — 97530 THERAPEUTIC ACTIVITIES: CPT

## 2023-05-18 PROCEDURE — 6370000000 HC RX 637 (ALT 250 FOR IP): Performed by: INTERNAL MEDICINE

## 2023-05-18 PROCEDURE — 99232 SBSQ HOSP IP/OBS MODERATE 35: CPT | Performed by: PHYSICAL MEDICINE & REHABILITATION

## 2023-05-18 PROCEDURE — 99232 SBSQ HOSP IP/OBS MODERATE 35: CPT | Performed by: INTERNAL MEDICINE

## 2023-05-18 PROCEDURE — 94664 DEMO&/EVAL PT USE INHALER: CPT

## 2023-05-18 PROCEDURE — 97112 NEUROMUSCULAR REEDUCATION: CPT

## 2023-05-18 PROCEDURE — 97130 THER IVNTJ EA ADDL 15 MIN: CPT

## 2023-05-18 RX ADMIN — APIXABAN 5 MG: 5 TABLET, FILM COATED ORAL at 09:18

## 2023-05-18 RX ADMIN — CARVEDILOL 6.25 MG: 6.25 TABLET, FILM COATED ORAL at 09:18

## 2023-05-18 RX ADMIN — LISINOPRIL 10 MG: 10 TABLET ORAL at 09:18

## 2023-05-18 RX ADMIN — MENTHOL AND METHYL SALICYLATE: 7.6; 29 OINTMENT TOPICAL at 09:18

## 2023-05-18 RX ADMIN — POTASSIUM CHLORIDE 40 MEQ: 1500 TABLET, EXTENDED RELEASE ORAL at 09:18

## 2023-05-18 RX ADMIN — FINASTERIDE 5 MG: 5 TABLET, FILM COATED ORAL at 09:17

## 2023-05-18 RX ADMIN — ASPIRIN 81 MG: 81 TABLET, COATED ORAL at 09:18

## 2023-05-18 RX ADMIN — MONTELUKAST SODIUM 10 MG: 10 TABLET, FILM COATED ORAL at 20:33

## 2023-05-18 RX ADMIN — Medication 10 ML: at 20:34

## 2023-05-18 RX ADMIN — HYDROCODONE BITARTRATE AND ACETAMINOPHEN 1 TABLET: 5; 325 TABLET ORAL at 20:40

## 2023-05-18 RX ADMIN — ALBUTEROL SULFATE 2.5 MG: 2.5 SOLUTION RESPIRATORY (INHALATION) at 16:15

## 2023-05-18 RX ADMIN — FLUTICASONE PROPIONATE 1 SPRAY: 50 SPRAY, METERED NASAL at 09:19

## 2023-05-18 RX ADMIN — FUROSEMIDE 20 MG: 20 TABLET ORAL at 09:18

## 2023-05-18 RX ADMIN — APIXABAN 5 MG: 5 TABLET, FILM COATED ORAL at 20:33

## 2023-05-18 RX ADMIN — ALBUTEROL SULFATE 2.5 MG: 2.5 SOLUTION RESPIRATORY (INHALATION) at 04:19

## 2023-05-18 RX ADMIN — CEFTRIAXONE SODIUM 1000 MG: 1 INJECTION, POWDER, FOR SOLUTION INTRAMUSCULAR; INTRAVENOUS at 15:17

## 2023-05-18 RX ADMIN — Medication 10 ML: at 15:15

## 2023-05-18 RX ADMIN — BUPROPION HYDROCHLORIDE 100 MG: 100 TABLET, EXTENDED RELEASE ORAL at 09:17

## 2023-05-18 RX ADMIN — CARVEDILOL 6.25 MG: 6.25 TABLET, FILM COATED ORAL at 17:38

## 2023-05-18 RX ADMIN — HYDROCODONE BITARTRATE AND ACETAMINOPHEN 1 TABLET: 5; 325 TABLET ORAL at 09:17

## 2023-05-18 RX ADMIN — ISOSORBIDE MONONITRATE 60 MG: 60 TABLET, EXTENDED RELEASE ORAL at 09:18

## 2023-05-18 RX ADMIN — ATORVASTATIN CALCIUM 10 MG: 10 TABLET, FILM COATED ORAL at 20:33

## 2023-05-18 ASSESSMENT — PAIN DESCRIPTION - ORIENTATION
ORIENTATION: LEFT
ORIENTATION: RIGHT

## 2023-05-18 ASSESSMENT — PAIN DESCRIPTION - LOCATION
LOCATION: LEG
LOCATION: SHOULDER

## 2023-05-18 ASSESSMENT — PAIN SCALES - GENERAL
PAINLEVEL_OUTOF10: 8
PAINLEVEL_OUTOF10: 4

## 2023-05-18 ASSESSMENT — PAIN DESCRIPTION - DESCRIPTORS
DESCRIPTORS: STABBING
DESCRIPTORS: DISCOMFORT

## 2023-05-19 LAB
ANION GAP SERPL CALCULATED.3IONS-SCNC: 10 MEQ/L (ref 9–15)
BUN SERPL-MCNC: 10 MG/DL (ref 8–23)
CALCIUM SERPL-MCNC: 8.7 MG/DL (ref 8.5–9.9)
CHLORIDE SERPL-SCNC: 102 MEQ/L (ref 95–107)
CO2 SERPL-SCNC: 24 MEQ/L (ref 20–31)
CREAT SERPL-MCNC: 0.68 MG/DL (ref 0.7–1.2)
ERYTHROCYTE [DISTWIDTH] IN BLOOD BY AUTOMATED COUNT: 13.4 % (ref 11.5–14.5)
GLUCOSE SERPL-MCNC: 94 MG/DL (ref 70–99)
HCT VFR BLD AUTO: 34.7 % (ref 42–52)
HGB BLD-MCNC: 11.9 G/DL (ref 14–18)
MCH RBC QN AUTO: 32.2 PG (ref 27–31.3)
MCHC RBC AUTO-ENTMCNC: 34.3 % (ref 33–37)
MCV RBC AUTO: 93.9 FL (ref 79–92.2)
PLATELET # BLD AUTO: 550 K/UL (ref 130–400)
POTASSIUM SERPL-SCNC: 4.2 MEQ/L (ref 3.4–4.9)
RBC # BLD AUTO: 3.7 M/UL (ref 4.7–6.1)
SODIUM SERPL-SCNC: 136 MEQ/L (ref 135–144)
WBC # BLD AUTO: 17.9 K/UL (ref 4.8–10.8)

## 2023-05-19 PROCEDURE — 2580000003 HC RX 258: Performed by: INTERNAL MEDICINE

## 2023-05-19 PROCEDURE — 99232 SBSQ HOSP IP/OBS MODERATE 35: CPT | Performed by: PHYSICAL MEDICINE & REHABILITATION

## 2023-05-19 PROCEDURE — 6360000002 HC RX W HCPCS: Performed by: INTERNAL MEDICINE

## 2023-05-19 PROCEDURE — 6370000000 HC RX 637 (ALT 250 FOR IP): Performed by: INTERNAL MEDICINE

## 2023-05-19 PROCEDURE — 99232 SBSQ HOSP IP/OBS MODERATE 35: CPT | Performed by: INTERNAL MEDICINE

## 2023-05-19 PROCEDURE — 97112 NEUROMUSCULAR REEDUCATION: CPT

## 2023-05-19 PROCEDURE — 94640 AIRWAY INHALATION TREATMENT: CPT

## 2023-05-19 PROCEDURE — 97535 SELF CARE MNGMENT TRAINING: CPT

## 2023-05-19 PROCEDURE — 6370000000 HC RX 637 (ALT 250 FOR IP): Performed by: PHYSICAL MEDICINE & REHABILITATION

## 2023-05-19 PROCEDURE — 94761 N-INVAS EAR/PLS OXIMETRY MLT: CPT

## 2023-05-19 PROCEDURE — 92526 ORAL FUNCTION THERAPY: CPT

## 2023-05-19 PROCEDURE — 97116 GAIT TRAINING THERAPY: CPT

## 2023-05-19 PROCEDURE — 97530 THERAPEUTIC ACTIVITIES: CPT

## 2023-05-19 PROCEDURE — 36415 COLL VENOUS BLD VENIPUNCTURE: CPT

## 2023-05-19 PROCEDURE — 6370000000 HC RX 637 (ALT 250 FOR IP): Performed by: REGISTERED NURSE

## 2023-05-19 PROCEDURE — 80048 BASIC METABOLIC PNL TOTAL CA: CPT

## 2023-05-19 PROCEDURE — 85027 COMPLETE CBC AUTOMATED: CPT

## 2023-05-19 PROCEDURE — 1180000000 HC REHAB R&B

## 2023-05-19 RX ORDER — BUDESONIDE 0.25 MG/2ML
0.5 INHALANT ORAL 2 TIMES DAILY
Status: DISCONTINUED | OUTPATIENT
Start: 2023-05-19 | End: 2023-05-23 | Stop reason: HOSPADM

## 2023-05-19 RX ADMIN — APIXABAN 5 MG: 5 TABLET, FILM COATED ORAL at 22:24

## 2023-05-19 RX ADMIN — CEFTRIAXONE SODIUM 1000 MG: 1 INJECTION, POWDER, FOR SOLUTION INTRAMUSCULAR; INTRAVENOUS at 13:45

## 2023-05-19 RX ADMIN — Medication 10 ML: at 22:25

## 2023-05-19 RX ADMIN — ISOSORBIDE MONONITRATE 60 MG: 60 TABLET, EXTENDED RELEASE ORAL at 09:32

## 2023-05-19 RX ADMIN — APIXABAN 5 MG: 5 TABLET, FILM COATED ORAL at 09:32

## 2023-05-19 RX ADMIN — CARVEDILOL 6.25 MG: 6.25 TABLET, FILM COATED ORAL at 18:50

## 2023-05-19 RX ADMIN — ALBUTEROL SULFATE 2.5 MG: 2.5 SOLUTION RESPIRATORY (INHALATION) at 04:38

## 2023-05-19 RX ADMIN — LISINOPRIL 10 MG: 10 TABLET ORAL at 09:32

## 2023-05-19 RX ADMIN — BUDESONIDE 500 MCG: 0.25 SUSPENSION RESPIRATORY (INHALATION) at 04:39

## 2023-05-19 RX ADMIN — FLUTICASONE PROPIONATE 1 SPRAY: 50 SPRAY, METERED NASAL at 09:32

## 2023-05-19 RX ADMIN — BUDESONIDE 500 MCG: 0.25 SUSPENSION RESPIRATORY (INHALATION) at 16:01

## 2023-05-19 RX ADMIN — HYDROCODONE BITARTRATE AND ACETAMINOPHEN 1 TABLET: 5; 325 TABLET ORAL at 09:38

## 2023-05-19 RX ADMIN — ALBUTEROL SULFATE 2.5 MG: 2.5 SOLUTION RESPIRATORY (INHALATION) at 09:23

## 2023-05-19 RX ADMIN — Medication 10 ML: at 09:35

## 2023-05-19 RX ADMIN — HYDROCODONE BITARTRATE AND ACETAMINOPHEN 1 TABLET: 5; 325 TABLET ORAL at 13:42

## 2023-05-19 RX ADMIN — FUROSEMIDE 20 MG: 20 TABLET ORAL at 09:32

## 2023-05-19 RX ADMIN — ATORVASTATIN CALCIUM 10 MG: 10 TABLET, FILM COATED ORAL at 22:24

## 2023-05-19 RX ADMIN — ACETAMINOPHEN 650 MG: 325 TABLET ORAL at 22:23

## 2023-05-19 RX ADMIN — FINASTERIDE 5 MG: 5 TABLET, FILM COATED ORAL at 09:32

## 2023-05-19 RX ADMIN — METHOCARBAMOL 500 MG: 500 TABLET ORAL at 22:24

## 2023-05-19 RX ADMIN — POTASSIUM CHLORIDE 40 MEQ: 1500 TABLET, EXTENDED RELEASE ORAL at 09:32

## 2023-05-19 RX ADMIN — ALBUTEROL SULFATE 2.5 MG: 2.5 SOLUTION RESPIRATORY (INHALATION) at 16:01

## 2023-05-19 RX ADMIN — HYDROCODONE BITARTRATE AND ACETAMINOPHEN 1 TABLET: 5; 325 TABLET ORAL at 22:23

## 2023-05-19 RX ADMIN — MONTELUKAST SODIUM 10 MG: 10 TABLET, FILM COATED ORAL at 22:24

## 2023-05-19 RX ADMIN — CARVEDILOL 6.25 MG: 6.25 TABLET, FILM COATED ORAL at 09:32

## 2023-05-19 RX ADMIN — BUPROPION HYDROCHLORIDE 100 MG: 100 TABLET, EXTENDED RELEASE ORAL at 09:32

## 2023-05-19 RX ADMIN — ASPIRIN 81 MG: 81 TABLET, COATED ORAL at 09:32

## 2023-05-19 ASSESSMENT — PAIN DESCRIPTION - PAIN TYPE: TYPE: ACUTE PAIN

## 2023-05-19 ASSESSMENT — PAIN DESCRIPTION - LOCATION
LOCATION: LEG
LOCATION: LEG

## 2023-05-19 ASSESSMENT — PAIN SCALES - GENERAL
PAINLEVEL_OUTOF10: 9
PAINLEVEL_OUTOF10: 5

## 2023-05-19 ASSESSMENT — PAIN DESCRIPTION - DIRECTION: RADIATING_TOWARDS: LEFT LEG

## 2023-05-19 ASSESSMENT — PAIN DESCRIPTION - ORIENTATION
ORIENTATION: LEFT
ORIENTATION: LEFT

## 2023-05-19 ASSESSMENT — PAIN DESCRIPTION - DESCRIPTORS: DESCRIPTORS: ACHING;DISCOMFORT

## 2023-05-20 PROCEDURE — 6370000000 HC RX 637 (ALT 250 FOR IP): Performed by: REGISTERED NURSE

## 2023-05-20 PROCEDURE — 6360000002 HC RX W HCPCS: Performed by: INTERNAL MEDICINE

## 2023-05-20 PROCEDURE — 94640 AIRWAY INHALATION TREATMENT: CPT

## 2023-05-20 PROCEDURE — 1180000000 HC REHAB R&B

## 2023-05-20 PROCEDURE — 6370000000 HC RX 637 (ALT 250 FOR IP): Performed by: PHYSICAL MEDICINE & REHABILITATION

## 2023-05-20 PROCEDURE — 2580000003 HC RX 258: Performed by: INTERNAL MEDICINE

## 2023-05-20 PROCEDURE — 6370000000 HC RX 637 (ALT 250 FOR IP): Performed by: INTERNAL MEDICINE

## 2023-05-20 PROCEDURE — 99232 SBSQ HOSP IP/OBS MODERATE 35: CPT | Performed by: INTERNAL MEDICINE

## 2023-05-20 PROCEDURE — 97116 GAIT TRAINING THERAPY: CPT

## 2023-05-20 PROCEDURE — 94761 N-INVAS EAR/PLS OXIMETRY MLT: CPT

## 2023-05-20 RX ORDER — ALBUTEROL SULFATE 2.5 MG/3ML
2.5 SOLUTION RESPIRATORY (INHALATION) EVERY 4 HOURS PRN
Status: DISCONTINUED | OUTPATIENT
Start: 2023-05-20 | End: 2023-05-23 | Stop reason: HOSPADM

## 2023-05-20 RX ADMIN — APIXABAN 5 MG: 5 TABLET, FILM COATED ORAL at 09:17

## 2023-05-20 RX ADMIN — BUDESONIDE 500 MCG: 0.25 SUSPENSION RESPIRATORY (INHALATION) at 03:40

## 2023-05-20 RX ADMIN — FLUTICASONE PROPIONATE 1 SPRAY: 50 SPRAY, METERED NASAL at 09:22

## 2023-05-20 RX ADMIN — ATORVASTATIN CALCIUM 10 MG: 10 TABLET, FILM COATED ORAL at 22:51

## 2023-05-20 RX ADMIN — ASPIRIN 81 MG: 81 TABLET, COATED ORAL at 09:17

## 2023-05-20 RX ADMIN — CARVEDILOL 6.25 MG: 6.25 TABLET, FILM COATED ORAL at 09:17

## 2023-05-20 RX ADMIN — POTASSIUM CHLORIDE 40 MEQ: 1500 TABLET, EXTENDED RELEASE ORAL at 09:17

## 2023-05-20 RX ADMIN — BUPROPION HYDROCHLORIDE 100 MG: 100 TABLET, EXTENDED RELEASE ORAL at 09:17

## 2023-05-20 RX ADMIN — HYDROCODONE BITARTRATE AND ACETAMINOPHEN 1 TABLET: 5; 325 TABLET ORAL at 22:51

## 2023-05-20 RX ADMIN — METHOCARBAMOL 500 MG: 500 TABLET ORAL at 09:17

## 2023-05-20 RX ADMIN — BUDESONIDE 500 MCG: 0.25 SUSPENSION RESPIRATORY (INHALATION) at 16:20

## 2023-05-20 RX ADMIN — FINASTERIDE 5 MG: 5 TABLET, FILM COATED ORAL at 09:17

## 2023-05-20 RX ADMIN — CARVEDILOL 6.25 MG: 6.25 TABLET, FILM COATED ORAL at 16:58

## 2023-05-20 RX ADMIN — METHOCARBAMOL 500 MG: 500 TABLET ORAL at 22:51

## 2023-05-20 RX ADMIN — ISOSORBIDE MONONITRATE 60 MG: 60 TABLET, EXTENDED RELEASE ORAL at 09:17

## 2023-05-20 RX ADMIN — ALBUTEROL SULFATE 2.5 MG: 2.5 SOLUTION RESPIRATORY (INHALATION) at 09:26

## 2023-05-20 RX ADMIN — ALBUTEROL SULFATE 2.5 MG: 2.5 SOLUTION RESPIRATORY (INHALATION) at 16:20

## 2023-05-20 RX ADMIN — MONTELUKAST SODIUM 10 MG: 10 TABLET, FILM COATED ORAL at 22:51

## 2023-05-20 RX ADMIN — Medication 10 ML: at 22:52

## 2023-05-20 RX ADMIN — APIXABAN 5 MG: 5 TABLET, FILM COATED ORAL at 22:51

## 2023-05-20 RX ADMIN — HYDROCODONE BITARTRATE AND ACETAMINOPHEN 1 TABLET: 5; 325 TABLET ORAL at 09:17

## 2023-05-20 RX ADMIN — LISINOPRIL 10 MG: 10 TABLET ORAL at 09:17

## 2023-05-20 RX ADMIN — ACETAMINOPHEN 650 MG: 325 TABLET ORAL at 22:51

## 2023-05-20 RX ADMIN — CEFTRIAXONE SODIUM 1000 MG: 1 INJECTION, POWDER, FOR SOLUTION INTRAMUSCULAR; INTRAVENOUS at 14:10

## 2023-05-20 RX ADMIN — FUROSEMIDE 20 MG: 20 TABLET ORAL at 09:17

## 2023-05-20 RX ADMIN — ALBUTEROL SULFATE 2.5 MG: 2.5 SOLUTION RESPIRATORY (INHALATION) at 03:38

## 2023-05-20 ASSESSMENT — ENCOUNTER SYMPTOMS
COUGH: 0
SHORTNESS OF BREATH: 1
GASTROINTESTINAL NEGATIVE: 1

## 2023-05-20 ASSESSMENT — PAIN DESCRIPTION - PAIN TYPE: TYPE: ACUTE PAIN

## 2023-05-20 ASSESSMENT — PAIN DESCRIPTION - ORIENTATION
ORIENTATION: LEFT
ORIENTATION: RIGHT

## 2023-05-20 ASSESSMENT — PAIN SCALES - GENERAL
PAINLEVEL_OUTOF10: 2
PAINLEVEL_OUTOF10: 2
PAINLEVEL_OUTOF10: 8

## 2023-05-20 ASSESSMENT — PAIN DESCRIPTION - LOCATION
LOCATION: LEG
LOCATION: SHOULDER

## 2023-05-20 ASSESSMENT — PAIN DESCRIPTION - DESCRIPTORS
DESCRIPTORS: ACHING
DESCRIPTORS: ACHING;DISCOMFORT

## 2023-05-21 PROCEDURE — 6360000002 HC RX W HCPCS: Performed by: INTERNAL MEDICINE

## 2023-05-21 PROCEDURE — 6370000000 HC RX 637 (ALT 250 FOR IP): Performed by: REGISTERED NURSE

## 2023-05-21 PROCEDURE — 6370000000 HC RX 637 (ALT 250 FOR IP): Performed by: INTERNAL MEDICINE

## 2023-05-21 PROCEDURE — 94761 N-INVAS EAR/PLS OXIMETRY MLT: CPT

## 2023-05-21 PROCEDURE — 99232 SBSQ HOSP IP/OBS MODERATE 35: CPT | Performed by: PHYSICAL MEDICINE & REHABILITATION

## 2023-05-21 PROCEDURE — 99232 SBSQ HOSP IP/OBS MODERATE 35: CPT | Performed by: INTERNAL MEDICINE

## 2023-05-21 PROCEDURE — 6370000000 HC RX 637 (ALT 250 FOR IP): Performed by: PHYSICAL MEDICINE & REHABILITATION

## 2023-05-21 PROCEDURE — 1180000000 HC REHAB R&B

## 2023-05-21 PROCEDURE — 94640 AIRWAY INHALATION TREATMENT: CPT

## 2023-05-21 PROCEDURE — 2580000003 HC RX 258: Performed by: INTERNAL MEDICINE

## 2023-05-21 PROCEDURE — 94664 DEMO&/EVAL PT USE INHALER: CPT

## 2023-05-21 RX ADMIN — POTASSIUM CHLORIDE 40 MEQ: 1500 TABLET, EXTENDED RELEASE ORAL at 09:06

## 2023-05-21 RX ADMIN — HYDROCODONE BITARTRATE AND ACETAMINOPHEN 1 TABLET: 5; 325 TABLET ORAL at 09:07

## 2023-05-21 RX ADMIN — Medication 10 ML: at 20:31

## 2023-05-21 RX ADMIN — BUDESONIDE 500 MCG: 0.25 SUSPENSION RESPIRATORY (INHALATION) at 04:47

## 2023-05-21 RX ADMIN — CARVEDILOL 6.25 MG: 6.25 TABLET, FILM COATED ORAL at 09:06

## 2023-05-21 RX ADMIN — APIXABAN 5 MG: 5 TABLET, FILM COATED ORAL at 20:28

## 2023-05-21 RX ADMIN — BUPROPION HYDROCHLORIDE 100 MG: 100 TABLET, EXTENDED RELEASE ORAL at 09:06

## 2023-05-21 RX ADMIN — ALBUTEROL SULFATE 2.5 MG: 2.5 SOLUTION RESPIRATORY (INHALATION) at 04:47

## 2023-05-21 RX ADMIN — FUROSEMIDE 20 MG: 20 TABLET ORAL at 09:06

## 2023-05-21 RX ADMIN — APIXABAN 5 MG: 5 TABLET, FILM COATED ORAL at 09:06

## 2023-05-21 RX ADMIN — Medication 10 ML: at 09:10

## 2023-05-21 RX ADMIN — ISOSORBIDE MONONITRATE 60 MG: 60 TABLET, EXTENDED RELEASE ORAL at 09:06

## 2023-05-21 RX ADMIN — LISINOPRIL 10 MG: 10 TABLET ORAL at 09:06

## 2023-05-21 RX ADMIN — FLUTICASONE PROPIONATE 1 SPRAY: 50 SPRAY, METERED NASAL at 09:07

## 2023-05-21 RX ADMIN — CEFTRIAXONE SODIUM 1000 MG: 1 INJECTION, POWDER, FOR SOLUTION INTRAMUSCULAR; INTRAVENOUS at 14:21

## 2023-05-21 RX ADMIN — FINASTERIDE 5 MG: 5 TABLET, FILM COATED ORAL at 09:06

## 2023-05-21 RX ADMIN — ATORVASTATIN CALCIUM 10 MG: 10 TABLET, FILM COATED ORAL at 20:27

## 2023-05-21 RX ADMIN — CARVEDILOL 6.25 MG: 6.25 TABLET, FILM COATED ORAL at 18:11

## 2023-05-21 RX ADMIN — METHOCARBAMOL 500 MG: 500 TABLET ORAL at 09:06

## 2023-05-21 RX ADMIN — ASPIRIN 81 MG: 81 TABLET, COATED ORAL at 09:06

## 2023-05-21 RX ADMIN — BUDESONIDE 500 MCG: 0.25 SUSPENSION RESPIRATORY (INHALATION) at 16:22

## 2023-05-21 RX ADMIN — HYDROCODONE BITARTRATE AND ACETAMINOPHEN 1 TABLET: 5; 325 TABLET ORAL at 04:23

## 2023-05-21 RX ADMIN — MONTELUKAST SODIUM 10 MG: 10 TABLET, FILM COATED ORAL at 20:28

## 2023-05-21 ASSESSMENT — PAIN DESCRIPTION - DESCRIPTORS
DESCRIPTORS: ACHING;DISCOMFORT
DESCRIPTORS: ACHING

## 2023-05-21 ASSESSMENT — PAIN DESCRIPTION - PAIN TYPE: TYPE: ACUTE PAIN

## 2023-05-21 ASSESSMENT — PAIN DESCRIPTION - ORIENTATION
ORIENTATION: RIGHT
ORIENTATION: LEFT;RIGHT

## 2023-05-21 ASSESSMENT — ENCOUNTER SYMPTOMS
SHORTNESS OF BREATH: 1
COUGH: 0
GASTROINTESTINAL NEGATIVE: 1

## 2023-05-21 ASSESSMENT — PAIN SCALES - GENERAL
PAINLEVEL_OUTOF10: 6
PAINLEVEL_OUTOF10: 6
PAINLEVEL_OUTOF10: 3

## 2023-05-21 ASSESSMENT — PAIN DESCRIPTION - LOCATION
LOCATION: SHOULDER
LOCATION: LEG;SHOULDER

## 2023-05-22 ENCOUNTER — APPOINTMENT (OUTPATIENT)
Dept: ULTRASOUND IMAGING | Age: 88
DRG: 175 | End: 2023-05-22
Attending: PHYSICAL MEDICINE & REHABILITATION
Payer: MEDICARE

## 2023-05-22 PROCEDURE — 97110 THERAPEUTIC EXERCISES: CPT

## 2023-05-22 PROCEDURE — 6370000000 HC RX 637 (ALT 250 FOR IP): Performed by: REGISTERED NURSE

## 2023-05-22 PROCEDURE — 93926 LOWER EXTREMITY STUDY: CPT

## 2023-05-22 PROCEDURE — 6370000000 HC RX 637 (ALT 250 FOR IP): Performed by: PHYSICAL MEDICINE & REHABILITATION

## 2023-05-22 PROCEDURE — 97535 SELF CARE MNGMENT TRAINING: CPT

## 2023-05-22 PROCEDURE — 2580000003 HC RX 258: Performed by: INTERNAL MEDICINE

## 2023-05-22 PROCEDURE — 1180000000 HC REHAB R&B

## 2023-05-22 PROCEDURE — 6370000000 HC RX 637 (ALT 250 FOR IP): Performed by: INTERNAL MEDICINE

## 2023-05-22 PROCEDURE — 97116 GAIT TRAINING THERAPY: CPT

## 2023-05-22 PROCEDURE — 97129 THER IVNTJ 1ST 15 MIN: CPT

## 2023-05-22 PROCEDURE — 99233 SBSQ HOSP IP/OBS HIGH 50: CPT | Performed by: PHYSICAL MEDICINE & REHABILITATION

## 2023-05-22 PROCEDURE — 97112 NEUROMUSCULAR REEDUCATION: CPT

## 2023-05-22 PROCEDURE — 94640 AIRWAY INHALATION TREATMENT: CPT

## 2023-05-22 PROCEDURE — 6360000002 HC RX W HCPCS: Performed by: INTERNAL MEDICINE

## 2023-05-22 PROCEDURE — 97530 THERAPEUTIC ACTIVITIES: CPT

## 2023-05-22 PROCEDURE — 97130 THER IVNTJ EA ADDL 15 MIN: CPT

## 2023-05-22 RX ORDER — BUDESONIDE 0.25 MG/2ML
500 INHALANT ORAL 2 TIMES DAILY
Qty: 60 EACH | Refills: 3 | Status: SHIPPED | OUTPATIENT
Start: 2023-05-22 | End: 2023-05-22 | Stop reason: SDUPTHER

## 2023-05-22 RX ORDER — ATORVASTATIN CALCIUM 10 MG/1
10 TABLET, FILM COATED ORAL NIGHTLY
Qty: 30 TABLET | Refills: 3 | Status: SHIPPED | OUTPATIENT
Start: 2023-05-22

## 2023-05-22 RX ORDER — LISINOPRIL 10 MG/1
10 TABLET ORAL DAILY
Qty: 30 TABLET | Refills: 3 | Status: SHIPPED | OUTPATIENT
Start: 2023-05-23

## 2023-05-22 RX ORDER — FINASTERIDE 5 MG/1
5 TABLET, FILM COATED ORAL DAILY
Qty: 30 TABLET | Refills: 3 | Status: SHIPPED | OUTPATIENT
Start: 2023-05-23

## 2023-05-22 RX ORDER — BUDESONIDE 0.25 MG/2ML
500 INHALANT ORAL 2 TIMES DAILY
Qty: 60 EACH | Refills: 3 | Status: SHIPPED | OUTPATIENT
Start: 2023-05-22

## 2023-05-22 RX ORDER — ISOSORBIDE MONONITRATE 60 MG/1
60 TABLET, EXTENDED RELEASE ORAL DAILY
Qty: 30 TABLET | Refills: 3 | Status: SHIPPED | OUTPATIENT
Start: 2023-05-23

## 2023-05-22 RX ORDER — BUPROPION HYDROCHLORIDE 100 MG/1
100 TABLET, EXTENDED RELEASE ORAL DAILY
Qty: 60 TABLET | Refills: 3 | Status: SHIPPED | OUTPATIENT
Start: 2023-05-23

## 2023-05-22 RX ORDER — ASPIRIN 81 MG/1
81 TABLET ORAL DAILY
Qty: 30 TABLET | Refills: 3 | Status: SHIPPED | OUTPATIENT
Start: 2023-05-23

## 2023-05-22 RX ORDER — FLUTICASONE PROPIONATE 50 MCG
1 SPRAY, SUSPENSION (ML) NASAL DAILY
Qty: 16 G | Refills: 3 | Status: SHIPPED | OUTPATIENT
Start: 2023-05-23

## 2023-05-22 RX ORDER — POTASSIUM CHLORIDE 20 MEQ/1
40 TABLET, EXTENDED RELEASE ORAL DAILY
Qty: 60 TABLET | Refills: 3 | Status: SHIPPED | OUTPATIENT
Start: 2023-05-23

## 2023-05-22 RX ORDER — FUROSEMIDE 20 MG/1
20 TABLET ORAL DAILY
Qty: 60 TABLET | Refills: 3 | Status: SHIPPED | OUTPATIENT
Start: 2023-05-23

## 2023-05-22 RX ADMIN — LISINOPRIL 10 MG: 10 TABLET ORAL at 09:35

## 2023-05-22 RX ADMIN — ASPIRIN 81 MG: 81 TABLET, COATED ORAL at 09:35

## 2023-05-22 RX ADMIN — ISOSORBIDE MONONITRATE 60 MG: 60 TABLET, EXTENDED RELEASE ORAL at 09:35

## 2023-05-22 RX ADMIN — HYDROCODONE BITARTRATE AND ACETAMINOPHEN 1 TABLET: 5; 325 TABLET ORAL at 09:35

## 2023-05-22 RX ADMIN — MENTHOL AND METHYL SALICYLATE: 7.6; 29 OINTMENT TOPICAL at 09:43

## 2023-05-22 RX ADMIN — FINASTERIDE 5 MG: 5 TABLET, FILM COATED ORAL at 09:36

## 2023-05-22 RX ADMIN — FLUTICASONE PROPIONATE 1 SPRAY: 50 SPRAY, METERED NASAL at 09:36

## 2023-05-22 RX ADMIN — BUPROPION HYDROCHLORIDE 100 MG: 100 TABLET, EXTENDED RELEASE ORAL at 09:35

## 2023-05-22 RX ADMIN — APIXABAN 5 MG: 5 TABLET, FILM COATED ORAL at 09:35

## 2023-05-22 RX ADMIN — FUROSEMIDE 20 MG: 20 TABLET ORAL at 09:35

## 2023-05-22 RX ADMIN — BUDESONIDE 500 MCG: 0.25 SUSPENSION RESPIRATORY (INHALATION) at 04:21

## 2023-05-22 RX ADMIN — HYDROCODONE BITARTRATE AND ACETAMINOPHEN 1 TABLET: 5; 325 TABLET ORAL at 20:48

## 2023-05-22 RX ADMIN — APIXABAN 5 MG: 5 TABLET, FILM COATED ORAL at 20:49

## 2023-05-22 RX ADMIN — POTASSIUM CHLORIDE 40 MEQ: 1500 TABLET, EXTENDED RELEASE ORAL at 09:35

## 2023-05-22 RX ADMIN — MONTELUKAST SODIUM 10 MG: 10 TABLET, FILM COATED ORAL at 20:49

## 2023-05-22 RX ADMIN — Medication 10 ML: at 09:44

## 2023-05-22 RX ADMIN — CARVEDILOL 6.25 MG: 6.25 TABLET, FILM COATED ORAL at 09:44

## 2023-05-22 RX ADMIN — BUDESONIDE 500 MCG: 0.25 SUSPENSION RESPIRATORY (INHALATION) at 16:40

## 2023-05-22 RX ADMIN — ATORVASTATIN CALCIUM 10 MG: 10 TABLET, FILM COATED ORAL at 20:49

## 2023-05-22 RX ADMIN — CEFTRIAXONE SODIUM 1000 MG: 1 INJECTION, POWDER, FOR SOLUTION INTRAMUSCULAR; INTRAVENOUS at 14:28

## 2023-05-22 RX ADMIN — CARVEDILOL 6.25 MG: 6.25 TABLET, FILM COATED ORAL at 17:44

## 2023-05-22 ASSESSMENT — PAIN DESCRIPTION - ORIENTATION
ORIENTATION: LEFT
ORIENTATION: RIGHT;LEFT

## 2023-05-22 ASSESSMENT — PAIN SCALES - GENERAL
PAINLEVEL_OUTOF10: 9
PAINLEVEL_OUTOF10: 4
PAINLEVEL_OUTOF10: 5

## 2023-05-22 ASSESSMENT — PAIN DESCRIPTION - LOCATION
LOCATION: LEG;SHOULDER
LOCATION: LEG

## 2023-05-22 NOTE — PLAN OF CARE
Problem: Discharge Planning  Goal: Discharge to home or other facility with appropriate resources  Outcome: Progressing     Problem: Safety - Adult  Goal: Free from fall injury  Outcome: Progressing     Problem: ABCDS Injury Assessment  Goal: Absence of physical injury  Outcome: Progressing     Problem: Skin/Tissue Integrity  Goal: Absence of new skin breakdown  Description: 1. Monitor for areas of redness and/or skin breakdown  2. Assess vascular access sites hourly  3. Every 4-6 hours minimum:  Change oxygen saturation probe site  4. Every 4-6 hours:  If on nasal continuous positive airway pressure, respiratory therapy assess nares and determine need for appliance change or resting period.   Outcome: Progressing     Problem: Pain  Goal: Verbalizes/displays adequate comfort level or baseline comfort level  Outcome: Progressing     Problem: Nutrition Deficit:  Goal: Optimize nutritional status  Outcome: Progressing

## 2023-05-22 NOTE — CARE COORDINATION
NAVNEETW met with pt and dtr and discussed discharge for tomorrow. Given freedom of choice, they would like [facility]. Dtr stated that they would prefer [facility] because they worked with her mother in the past and would like Danya Galvez (therapist). LSW to notify Minor. Dtr stated that she received a call from [facility] regarding nebulizer treatment, but a prescription is needed. Zeina Samuel (RN) was notified. LSW also made both aware that discharge will be after 2PM, dtr verbalized understanding and will be here.  Electronically signed by YOGESH Bell, AMERICO on 5/22/2023 at 4:44 PM

## 2023-05-22 NOTE — CARE COORDINATION
[address] [place] [address] NOTE  Room: R249/R249-01  Admit Date: 2023       Date: 2023  Patient Name: Konstantin Grant        MRN: [id number]    : 1934  (80 y.o.)  Gender: male        REHAB DIAGNOSIS:   Diagnosis: Impaired mobility and ADL's due to debility due to right-sided pulmonary emboli- rehab admit 23    CO MORBIDITIES:      Past Medical History:   Diagnosis Date    Arthritis     Dysuria-frequency syndrome     Elevated PSA     Heart disease     High blood pressure     History of BPH     History of pneumonia     History of UTI     Sinus trouble      Past Surgical History:   Procedure Laterality Date    BACK SURGERY      back fused    CHOLECYSTECTOMY, OPEN  11/20/15    Salena Rummer    JOINT REPLACEMENT      TURP  16    USING LASER VAPORIZATION        Restrictions  Restrictions/Precautions: Fall Risk  CASE MANAGEMENT    Social/Functional History  Social/Functional History  Lives With: Daughter (and dtrs SO)  Type of Home: House  Home Layout: One level  Home Access: Stairs to enter with rails  Entrance Stairs - Number of Steps: 2 + 4 from garage , 5 front steps  Entrance Stairs - Rails: Both  Bathroom Shower/Tub: Walk-in shower, Doors, Shower chair with back (sliding glass doors)  Bathroom Toilet: Standard  Bathroom Equipment: Grab bars in shower, Shower chair, Hand-held shower, Grab bars around toilet  Home Equipment: David Kwasi, rolling, Wheelchair-manual (all DME was wife's (who is now ))  Has the patient had two or more falls in the past year or any fall with injury in the past year?: Yes  ADL Assistance: Independent  Homemaking Assistance: Independent  Homemaking Responsibilities: No  Meal Prep Responsibility: No (dtr completes)  Laundry Responsibility: No (dtr completes)  Cleaning Responsibility: No (dtr completes)  Bill Paying/Finance Responsibility:  (pt writes checks and dtr will pay)  Shopping Responsibility: No  Health

## 2023-05-22 NOTE — FLOWSHEET NOTE
Patient alert, oriented and cooperative. Complains of right shoulder pain and left lower leg pain. Pain medications moderately effective. Denies any further needs or complaints at this time. Call light within reach.

## 2023-05-22 NOTE — PLAN OF CARE
Problem: Discharge Planning  Goal: Discharge to home or other facility with appropriate resources  5/22/2023 1107 by Danya Myers RN  Outcome: Progressing  5/22/2023 0242 by Jorge L Sims RN  Outcome: Progressing     Problem: Safety - Adult  Goal: Free from fall injury  5/22/2023 1107 by Danya Myers RN  Outcome: Progressing  5/22/2023 0242 by Jorge L Sims RN  Outcome: Progressing     Problem: ABCDS Injury Assessment  Goal: Absence of physical injury  5/22/2023 1107 by Danya Myers RN  Outcome: Progressing  5/22/2023 0242 by Jorge L Sims RN  Outcome: Progressing     Problem: Skin/Tissue Integrity  Goal: Absence of new skin breakdown  Description: 1. Monitor for areas of redness and/or skin breakdown  2. Assess vascular access sites hourly  3. Every 4-6 hours minimum:  Change oxygen saturation probe site  4. Every 4-6 hours:  If on nasal continuous positive airway pressure, respiratory therapy assess nares and determine need for appliance change or resting period.   5/22/2023 1107 by Danya Myers RN  Outcome: Progressing  5/22/2023 0242 by Jorge L Sims RN  Outcome: Progressing     Problem: Pain  Goal: Verbalizes/displays adequate comfort level or baseline comfort level  5/22/2023 1107 by Danya Myers RN  Outcome: Progressing  5/22/2023 0242 by Jorge L Sims RN  Outcome: Progressing     Problem: Nutrition Deficit:  Goal: Optimize nutritional status  5/22/2023 1107 by Danya Myers RN  Outcome: Progressing  5/22/2023 0242 by Jorge L Sims RN  Outcome: Progressing

## 2023-05-23 VITALS
SYSTOLIC BLOOD PRESSURE: 144 MMHG | RESPIRATION RATE: 19 BRPM | HEIGHT: 72 IN | BODY MASS INDEX: 29.96 KG/M2 | OXYGEN SATURATION: 94 % | WEIGHT: 221.2 LBS | DIASTOLIC BLOOD PRESSURE: 76 MMHG | TEMPERATURE: 97.7 F | HEART RATE: 78 BPM

## 2023-05-23 PROCEDURE — 97116 GAIT TRAINING THERAPY: CPT

## 2023-05-23 PROCEDURE — 97112 NEUROMUSCULAR REEDUCATION: CPT

## 2023-05-23 PROCEDURE — 6370000000 HC RX 637 (ALT 250 FOR IP): Performed by: PHYSICAL MEDICINE & REHABILITATION

## 2023-05-23 PROCEDURE — 99232 SBSQ HOSP IP/OBS MODERATE 35: CPT | Performed by: INTERNAL MEDICINE

## 2023-05-23 PROCEDURE — 99239 HOSP IP/OBS DSCHRG MGMT >30: CPT | Performed by: PHYSICAL MEDICINE & REHABILITATION

## 2023-05-23 PROCEDURE — 6370000000 HC RX 637 (ALT 250 FOR IP): Performed by: INTERNAL MEDICINE

## 2023-05-23 PROCEDURE — 94640 AIRWAY INHALATION TREATMENT: CPT

## 2023-05-23 PROCEDURE — 6370000000 HC RX 637 (ALT 250 FOR IP): Performed by: REGISTERED NURSE

## 2023-05-23 PROCEDURE — 6360000002 HC RX W HCPCS: Performed by: INTERNAL MEDICINE

## 2023-05-23 PROCEDURE — 97530 THERAPEUTIC ACTIVITIES: CPT

## 2023-05-23 RX ORDER — HYDROCODONE BITARTRATE AND ACETAMINOPHEN 5; 325 MG/1; MG/1
1 TABLET ORAL EVERY 4 HOURS PRN
Qty: 20 TABLET | Refills: 0 | Status: SHIPPED | OUTPATIENT
Start: 2023-05-23 | End: 2023-05-30

## 2023-05-23 RX ORDER — METHOCARBAMOL 500 MG/1
500 TABLET, FILM COATED ORAL 3 TIMES DAILY PRN
Qty: 60 TABLET | Refills: 0 | Status: SHIPPED | OUTPATIENT
Start: 2023-05-23 | End: 2023-06-02

## 2023-05-23 RX ADMIN — POTASSIUM CHLORIDE 40 MEQ: 1500 TABLET, EXTENDED RELEASE ORAL at 11:54

## 2023-05-23 RX ADMIN — FINASTERIDE 5 MG: 5 TABLET, FILM COATED ORAL at 11:55

## 2023-05-23 RX ADMIN — FLUTICASONE PROPIONATE 1 SPRAY: 50 SPRAY, METERED NASAL at 11:55

## 2023-05-23 RX ADMIN — BUDESONIDE 500 MCG: 0.25 SUSPENSION RESPIRATORY (INHALATION) at 03:59

## 2023-05-23 RX ADMIN — CARVEDILOL 6.25 MG: 6.25 TABLET, FILM COATED ORAL at 11:55

## 2023-05-23 RX ADMIN — LISINOPRIL 10 MG: 10 TABLET ORAL at 11:55

## 2023-05-23 RX ADMIN — ASPIRIN 81 MG: 81 TABLET, COATED ORAL at 11:55

## 2023-05-23 RX ADMIN — ISOSORBIDE MONONITRATE 60 MG: 60 TABLET, EXTENDED RELEASE ORAL at 11:54

## 2023-05-23 RX ADMIN — APIXABAN 5 MG: 5 TABLET, FILM COATED ORAL at 11:55

## 2023-05-23 RX ADMIN — FUROSEMIDE 20 MG: 20 TABLET ORAL at 11:55

## 2023-05-23 RX ADMIN — HYDROCODONE BITARTRATE AND ACETAMINOPHEN 1 TABLET: 5; 325 TABLET ORAL at 05:25

## 2023-05-23 RX ADMIN — BUPROPION HYDROCHLORIDE 100 MG: 100 TABLET, EXTENDED RELEASE ORAL at 11:54

## 2023-05-23 ASSESSMENT — PAIN DESCRIPTION - DESCRIPTORS: DESCRIPTORS: BURNING;THROBBING

## 2023-05-23 ASSESSMENT — PAIN DESCRIPTION - ORIENTATION: ORIENTATION: LEFT

## 2023-05-23 ASSESSMENT — PAIN DESCRIPTION - LOCATION: LOCATION: LEG

## 2023-05-23 ASSESSMENT — PAIN SCALES - GENERAL: PAINLEVEL_OUTOF10: 10

## 2023-05-23 NOTE — PLAN OF CARE
Problem: Discharge Planning  Goal: Discharge to home or other facility with appropriate resources  5/23/2023 0022 by Sharmin Neves RN  Outcome: Progressing     Problem: Safety - Adult  Goal: Free from fall injury  5/23/2023 0022 by Sharmin Neves RN  Outcome: Progressing     Problem: ABCDS Injury Assessment  Goal: Absence of physical injury  5/23/2023 0022 by Sharmin Neves RN  Outcome: Progressing     Problem: Skin/Tissue Integrity  Goal: Absence of new skin breakdown  Description: 1. Monitor for areas of redness and/or skin breakdown  2. Assess vascular access sites hourly  3. Every 4-6 hours minimum:  Change oxygen saturation probe site  4. Every 4-6 hours:  If on nasal continuous positive airway pressure, respiratory therapy assess nares and determine need for appliance change or resting period.   5/23/2023 0022 by Sharmin Neves RN  Outcome: Progressing     Problem: Pain  Goal: Verbalizes/displays adequate comfort level or baseline comfort level  5/23/2023 0022 by Sharmin Neves RN  Outcome: Progressing     Problem: Nutrition Deficit:  Goal: Optimize nutritional status  5/23/2023 0022 by Sharmin Neves RN  Outcome: Progressing

## 2023-05-23 NOTE — CARE COORDINATION
NAVNEETW called in referral to [facility]. Electronically signed by YOGESH Montalvo LSW on 5/23/2023 at 10:09 AM    LSW received return call from Cayla at [facility] and she stated that they accepted pt. LSW notified her of pt's request to work with Dayo Glass, she stated that she would make a note in pt's chart. Electronically signed by YOGESH Montalvo LSW on 5/23/2023 at 10:18 AM    Pt is set to discharge today after 2PM with dtr providing transportation. Pt will receive [facility] upon discharge. No new DME needed. Pt stated that he had no questions or concerns.  Electronically signed by YOGESH Montalvo LSW on 5/23/2023 at 12:19 PM

## 2023-05-23 NOTE — DISCHARGE SUMMARY
pathology as needed  Recent pneumonia, sepsis secondary to UTI, Urinary tract infection without hematuria-titrate Rocephin last dose scheduled for 5/22/2023  COPD, seasonal allergies-patient is on Flonase Flonase can suppress ability to fend off viruses and infection therefore try to transition to Afrin-Acute rehab for endurance traing with Pulse Ox to monitoring oxygen saturation and heart rate with O2 titration to lowest effective dose. Pulse oximeter checks to shift and at HS to dose and titrate oxygen and aerosol treatments monitor for nocturnal hypoxemia, monitor vital signs, oxygen prn. Focus on energy conservation. Oral pharyngeal dysphagia-soft bite sized foods thin liquids        Focus on coordinating dc plans with patient family and care givers and order necessary equipment.           Electronically signed by Caridad Catalan DO on 5/15/23 at 8:17 AM ERNIE Teague D.O., PM&R     Attending    [address]

## 2023-05-24 NOTE — PROGRESS NOTES
[facility]  [facility] Acute Rehab Discharge Summary  [facility]    NAME: Desmond Nino  : 1934  MRN: [id number]    Admission Date: 2023  5:51 PM  Discharge Date: 23    Rehab Diagnosis(es): Impaired mobility and ADL's due to debility due to right-sided pulmonary emboli- rehab admit 23  Patient Active Problem List    Diagnosis Date Noted    Pneumonia with cavity of lung 2023    Urinary tract infection without hematuria 2023    Impaired mobility and ADLs due to cardiopulmonary debility right PE 2023    Sepsis secondary to UTI ([facility] [address] 2023    Essential hypertension, benign 2023    Sensorineural hearing loss (SNHL) of both ears 2023    Spinal stenosis of lumbar region with neurogenic claudication 2023    Pulmonary embolism ([facility] [address] 2023    Osteoarthritis of knee 2023    Chronic pain of both shoulders 2023    Pain in right knee 2019    Pain in right shoulder 2019    Unilateral primary osteoarthritis, right knee 10/24/2018    Primary osteoarthritis of both hands 2018    Trigger ring finger of left hand 2018    Pseudophakia of both eyes 2017    Chronic cholecystitis with calculus 2015    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 2015    Tear film insufficiency, unspecified 2015    Zoster ophthalmicus 2014    Old MI (myocardial infarction) 2012    Coronary atherosclerosis 2012    Hyperlipidemia 2012    Displacement of lumbar intervertebral disc without myelopathy 2008       Past Medical History:   Diagnosis Date    Arthritis     Dysuria-frequency syndrome     Elevated PSA     Heart disease     High blood pressure     History of BPH     History of pneumonia     History of UTI     Sinus trouble      Past Surgical History:   Procedure Laterality Date    BACK SURGERY      back fused    CHOLECYSTECTOMY, OPEN  11/20/15
[facility] Cardiology Progress Note        Date:     2023    Patient:    Cm Prajapati    :    1934  CSN:    [id number]    Rounding Cardiologist:  Dr. Alberto Maher MD    Primary Cardiologist:  Alberto Maher MD [facility]    Requesting Physician:   Anam Garcia DO      Reason for Initial Consult:   CHF    Subjective:    Chart reviewed and patient examined. No changes overnite. Doing well in Rehab. Stress Myoview was norrmal.  LV Function Normal.    No new complaints. 14 system General and Cardiac ROS otherwise negative or unchanged. Assessment:    Cardiac Stable  SOB  Fall without loss of consciousness  CHF: Diastolic CHF, acute, probable primary due to Right Heart Pulmonary Disease. Pulmonary embolism. Right sided pulmonary embolism. Anticoagulation:  Eliquis   PNA, HS , WBC 24. Hyponatremia, resolved. CAD: Troponins negative to date, no MI. Normal myocardial stress  and 2023. Normal LV function, LVEF 50%  Hypertension  Hyperlipidemia   Otherwise as below and prior    Plan:    Cardiac Supportive Care  OK for Rehab activities from CV point. Continue current medications, adjust as needed. Lexiscan Stress reviewed and normal. No evidence of CAD or MI. Pulmonary and ID for PNA and Acute PE. OK from CV point for DC home once ok with others. Follow up with Claire Greenwood in one month. Further Recommendations to follow  See orders.    ========================================    HISTORY OF PRESENT ILLNESS:      Cm Prajapati is a pleasant 80 y.o. male with above PMH who presented to [id number] [place] [address] ER 2023 with CCO S/P fall . EKG showed NSR HR 89 bpm. Chest x-ray showed right mid lung airspace opacity possible pulmonary contusion in the setting of trauma. CT chest showed right sided pulmonary embolism without evidence of right sided heart strain. Right upper lobe peripheral consolidation concerning for pneumonia vs pulmonary infarct.  Bilateral venous duplex negative for
[address] [place] [address]    Acute  Rehabilitation  MUSIC THERAPY      Date:  5/23/2023        Patient Name: Naila Aguilar       MRN: [id number]        YOB: 1934 (80 y.o.)       Gender: male  Diagnosis: Impaired mobility and ADL's due to debility due to right-sided pulmonary emboli. Referring Practitioner: Dr. Caridad Catalan, DO    RESTRICTIONS/PRECAUTIONS:  Restrictions/Precautions: Fall Risk  Vision: Within Functional Limits  Hearing: Exceptions to [facility]  Hearing Exceptions: Hard of hearing/hearing concerns, Bilateral hearing aid    Subjective/Observation:   Patient declined participating in individual music therapy session at 12:20pm stating \"No not really\" when Meagan Yan asked if he was interested in having music therapy. Assessment/Plan:      [] Patient would like to have music therapy, just not today. [facility]. [] Patient would like to have music therapy another time, however their D/C is before [facility] will be back on unit. *If patient is still on rehab unit, or comes back to rehab unit, [facility] allows. [x] Patient does not want music therapy. [facility] will not attempt to see pt again unless pt specifically requests.        NAVEEN Mackay    5/23/2023  Electronically signed by Luis Raya on 5/23/2023 at 12:58 PM
Assessment completed. A&O x4. Denies pain at this time. Scheduled for dc this afternoon. In bed with alarm activated. Call light in reach. Electronically signed by Hubert Arteaga LPN on 2/75/7314 at 23:15 PM      Gave dc instructions to pt and pt's daughter. Both stated understanding. Prescriptions brought up from outpatient pharmacy. Waiting on transport. No s/s of distress.  Electronically signed by Hubert Arteaga LPN on 3/22/0653 at 0:21 PM
Assessment completed. VSS. LBM 5/22. Medicated with prn norco for 9/10 left leg pain (from knee to calf.) Arterial u/s of LLE done- \"Mild small vessel PAD below the knee joint. No occlusion or focal high-grade stenosis. Waveforms appear normal.\" Completed IV Rocephin for UTI, last dose 5/22. No distress noted. Call light within reach and bed alarm activated.   Electronically signed by Jan Pinedo RN on 5/23/2023 at 12:57 AM
CLINICAL PHARMACY NOTE: MEDS TO BEDS    Total # of Prescriptions Filled: 8   The following medications were delivered to the patient:  Aspirin 81 mg Tab  Finasteride 5 mg Tab  Atorvastatin 10 mg Tab  Lisinopril 10 mg Tab  Eliquis 5 mg Tab  Furosemide 20 mg Tab  Bupropion Er (Sr) 100 mg Tab  Potassium Er 20 meq Tab    Additional Documentation:
INDIVIDUALIZED OVERALL REHAB PLAN OF CARE  ADDENDUM TO REHAB PROGRESS NOTE-for audit purposes must also refer to this day's clinical note and combine the information      Date: 2023  Patient Name: Joy Vegas   Room: L379/U184-41    MRN: [id number]    : 1934  (80 y.o.)  Gender: male       Today 2023 during weekly team meeting, I reviewed the patient Joy Vegas in detail with the therapists and nurses involved in patient's care gathering complex physiatric data regarding current medical issues, progress in therapies, factors limiting progress, social issues, psychological issues, ongoing therapeutic plans and discharge planning. Legend:  I= independent Im =Modified independent  S=Supervised SB=stand by KEARNEY=set up CG=contact eugenia Min= minimal Mod=Moderate Max=maximal Max of 2 =maximal assist of 2 people      CURRENT FUNCTIONAL STATUS:    Joy Vegas is a 80 y.o. male admitted to [facility] on 2023. Patient was admitted through the ER at [facility] on May 8, 2023 after several falls at home. He admitted to striking his head during one of the falls. He was brought in via EMS after the family called 911. Upon arrival he was complaining of shortness of breath. He was eventually found to have had a-Right-sided pulmonary emboli. He is now on therapeutic dose of Lovenox. He was admitted to the care of the hospitalist with pulmonology consulting. Pulmonology assessed him and found that he had the right upper lobe pulmonary embolism as well as a right upper lobe cavity pneumonia rule out aspiration. They advised continuing antibiotic rochephen. Titration of O2    NURSING ISSUES:   Patient resting comfortably in bed. Bed alarm is on, call light is within reach. Patient denies pain at this time. Will continue to monitor  Nursing will continue to focus on bowel and bladder continence transitioning toward independence by time of discharge.   Monitoring post void
INPATIENT PROGRESS NOTES    PATIENT NAME: Chio Baron  MRN: [id number]  SERVICE DATE:  May 23, 2023   SERVICE TIME:  9:10 AM      PRIMARY SERVICE: Pulmonary Disease    CHIEF COMPLAIN: Cough      INTERVAL HPI: Patient seen and examined at bedside, Interval Notes, orders reviewed. Nursing notes noted  Patient is feeling better. No new problem overnight. No shortness of breath no chest pain. No fever. No nausea, vomiting, diarrhea or abdominal pain. No chest pain. Going home today    OBJECTIVE    Body mass index is 30 kg/m². PHYSICAL EXAM:  Vitals:  BP (!) 144/78   Pulse 80   Temp 97.7 °F (36.5 °C)   Resp 19   Ht 6' (1.829 m)   Wt 221 lb 3.2 oz (100.3 kg)   SpO2 94%   BMI 30.00 kg/m²   General: Alert, awake . comfortable in bed, No distress. Head: Atraumatic , Normocephalic   Eyes: PERRL. No sclera icterus. No conjunctival injection. No discharge   ENT: No nasal  discharge. Pharynx clear. Neck:  Trachea midline. No thyromegaly, no JVD, No cervical adenopathy. Chest : Bilaterally symmetrical ,Normal effort,  No accessory muscle use  Lung : . Fair BS bilateral, decreased BS at bases. No Rales. No wheezing. No rhonchi. Heart[de-identified] Normal  rate. Regular rhythm. No mumur ,  Rub or gallop  ABD: Non-tender. Non-distended. No masses. No organmegaly. Normal bowel sounds. No hernia. Ext : No Pitting both leg , No Cyanosis No clubbing  Neuro: no focal weakness          DATA:   No results for input(s): WBC, HGB, HCT, MCV, PLT in the last 72 hours. No results for input(s): NA, K, CL, CO2, BUN, CREATININE, GLUCOSE, CALCIUM, PROT, LABALBU, BILITOT, ALKPHOS, AST, ALT, LABGLOM, GFRAA, AGRATIO, GLOB in the last 72 hours. Lab Results   Component Value Date/Time    LACTA 1.6 05/08/2023 12:15 AM     Blood Culture, Routine   Date Value Ref Range Status   05/08/2023 No growth after 5 days of incubation.   Final     No results found for: [email or web address]     MV Settings:          No results for input(s): PHART,
INPATIENT PROGRESS NOTES    PATIENT NAME: Cm Prajapati  MRN: [id number]  SERVICE DATE:  May 22, 2023   SERVICE TIME:  5:24 PM      PRIMARY SERVICE: Pulmonary Disease    CHIEF COMPLAIN: Cough      INTERVAL HPI: Patient seen and examined at bedside, Interval Notes, orders reviewed. Nursing notes noted  Patient is feeling better. No new problem overnight. No shortness of breath no chest pain. No fever. No nausea, vomiting, diarrhea or abdominal pain. No chest pain. OBJECTIVE    Body mass index is 30 kg/m². PHYSICAL EXAM:  Vitals:  /78   Pulse 80   Temp 98.4 °F (36.9 °C) (Oral)   Resp 17   Ht 6' (1.829 m)   Wt 221 lb 3.2 oz (100.3 kg)   SpO2 94%   BMI 30.00 kg/m²   General: Alert, awake . comfortable in bed, No distress. Head: Atraumatic , Normocephalic   Eyes: PERRL. No sclera icterus. No conjunctival injection. No discharge   ENT: No nasal  discharge. Pharynx clear. Neck:  Trachea midline. No thyromegaly, no JVD, No cervical adenopathy. Chest : Bilaterally symmetrical ,Normal effort,  No accessory muscle use  Lung : . Fair BS bilateral, decreased BS at bases. No Rales. No wheezing. No rhonchi. Heart[de-identified] Normal  rate. Regular rhythm. No mumur ,  Rub or gallop  ABD: Non-tender. Non-distended. No masses. No organmegaly. Normal bowel sounds. No hernia. Ext : No Pitting both leg , No Cyanosis No clubbing  Neuro: no focal weakness          DATA:   No results for input(s): WBC, HGB, HCT, MCV, PLT in the last 72 hours. No results for input(s): NA, K, CL, CO2, BUN, CREATININE, GLUCOSE, CALCIUM, PROT, LABALBU, BILITOT, ALKPHOS, AST, ALT, LABGLOM, GFRAA, AGRATIO, GLOB in the last 72 hours. Lab Results   Component Value Date/Time    LACTA 1.6 05/08/2023 12:15 AM     Blood Culture, Routine   Date Value Ref Range Status   05/08/2023 No growth after 5 days of incubation.   Final     No results found for: [email or web address]@[employer]     MV Settings:          No results for input(s): PHART, [id number], PO2ART,
[facility]   Facility/Department: [facility]  Speech Language Pathology  Discharge Report        Patient: Michelle Cramer  : 1934    Date: 2023    Initial Status:  Diet:   Soft, bite size solids with thin liquids  Dysphagia Outcome Severity Scale:  Ratin    Speech Therapy Level of Assistance Scale: Auditory Comprehension:  Rating: Modified Independent  Verbal Expression:  Rating:Modified Independent  Motor Speech:  Rating: Independent  Problem Solving:  Rating: Minimal Assistance-Moderate Assistance  Memory:  Rating: Minimal Assistance-Moderate Assistance      Long Term Goals:  Long Term Goals  Time Frame for Long Term Goals: 2 weeks  Goal 1: Pt will demonstrate functional cognitive-linguistic abilities in all opportunities with modified independence in order to safely complete ADLs. Long-term Goals  Timeframe for Long-term Goals: 1 week  Goal 1: Pt will tolerate least restrictive diet with no adverse outcomes. Patient's Response to Therapy:  Patient presented with improvement in all areas that were treated including strength of the oral and pharyngeal phase of the swallow mechanism, problem solving and memory skills. ST recommends intermittent supervision and temporary assistance with medication and finance management      Discharge Status:  Diet:   No diet orders on file  Compensatory Swallowing Strategies : Small bites/sips, Upright as possible for all oral intake, Alternate solids and liquids  Dysphagia Outcome Severity Scale:  Ratin    Speech Therapy Level of Assistance Scale: Auditory Comprehension:  Rating: Modified Independent  Verbal Expression:  Rating:Modified Independent  Motor Speech:  Rating: Modified Independent  Problem Solving:  Rating: Supervised Assistance  Memory:  Rating: Supervised Assistance        Functional Status at time of Discharge:    Cognition: Patient demonstrates mild cognitive deficits. Language: Patient demonstrates no language deficits.     Motor
[facility]  Facility/Department: [facility]  Speech Language Pathology   Treatment Note          Andres Khan  1934  A990/Y394-20  [x]   confirmed    Date: 2023      Restrictions/Precautions: Fall Risk    Weight - Scale: 221 lb 3.2 oz (100.3 kg)     ADULT DIET; Dysphagia - Soft and Bite Sized  ADULT ORAL NUTRITION SUPPLEMENT; Breakfast, Dinner; Standard High Calorie/High Protein Oral Supplement    SpO2: 94 % (23 0747)  No active isolations    Rehab Diagnosis: Impaired mobility and ADL's due to debility due to right-sided pulmonary emboli- rehab admit 23    Subjective:  Alert, Cooperative, and Pleasant        Interventions used this date:  Cognitive Skill Development    Objective/Assessment:  Patient progressing towards goals:  Short Term Goals  Time Frame for Short Term Goals: 2 weeks  Goal 1: To increase safety awareness and judgment for safe completion of ADLs secondary to pt's cognitive deficits, pt will sequence common activities of daily living with (verbal/written) steps with 80% accuracy and min cues. Patient completed a 4 step written sequencing task I with 100% accuracy. Goal 2: To increase safety awareness and judgment for safe completion of ADLs secondary to pt's cognitive deficits, pt will complete abstract reasoning tasks (i.e. Word deduction, convergent and divergent naming, similarities/differences) with 80% accuracy and min cues. Patient named items by description I with 80% accuracy, with supervised to min cueing 100% accuracy. Goal 3: To address pt's cognitive deficits and promote recall of personal and medical information, pt will answer questions addressing (remote, recent, delayed) recall with 80% accuracy and min cues.  With use of repetition strategy, Patient recalled 3 words than placed them in order by occurrence with the following accuracy  Recall: 62% accuracy I, with repetition 75% accuracy  Occurrence: I with 75% accuracy,     Treatment/Activity
OCCUPATIONAL THERAPY  INPATIENT REHAB TREATMENT NOTE  [facility]      NAME: Murile Aleman  : 1934 (84 y.o.)  MRN: [id number]  CODE STATUS: Full Code  Room: K202/I451-20    Date of Service: 2023    Referring Physician: Dr. Vidhi Dimas DO  Rehab Diagnosis: Impaired mobility and ADL's due to debility due to right-sided pulmonary emboli. Restrictions:Fall                 Patient's date of birth confirmed: Yes    SAFETY:  Safety Devices  Safety Devices in place: Yes  Type of devices: All fall risk precautions in place    SUBJECTIVE:\"I tried using one before and they didn't work. I do use my grabber. \"(Sock aid)       Pain at start of treatment: No    Pain at end of treatment: No    Location: N/A  Description N/A  Nursing notified: Not Applicable  RN: N/A  Intervention: Repositioned    COGNITION:         Patient's cognition will improve or maintain baseline to safely perform ADLs:    OBJECTIVE:      Pt completed acts standing at table top to increase stand tolerance for acts completion. Pt completed sorting one large deck of playing cards to suit and order. Pt completed sorting to suit without rest break. Pt completed sorting to order with seated rest break between each suit. Pt required increased time for task completion. Pt completed toileting and toilet transfer Mod I.  Pt completed functional ambulation from bathroom to bed without device with supervision. Pt completed bed mobility mod I.         Education:N/A       Equipment recommendations:N/A         ASSESSMENT:Pt limited in function secondary to noted SOB and fatigue. Nursing staff informed. PLAN OF CARE:  Strengthening, ROM, Functional mobility training, Endurance training, Pain management, Safety education & training, Patient/Caregiver education & training, Equipment evaluation, education, & procurement, Self-Care / ADL       Patient goals : \"To get out of bed by myself. \"  Long Term Goal 1: Pt will be Mod IND with UB
OCCUPATIONAL THERAPY  INPATIENT REHAB TREATMENT NOTE  [facility]      NAME: Gianfranco Haider  : 1934 (21 y.o.)  MRN: [id number]  CODE STATUS: Full Code  Room: D130/E338-23    Date of Service: 2023    Referring Physician: Dr. Jeanette Call,   Rehab Diagnosis: Impaired mobility and ADL's due to debility due to right-sided pulmonary emboli. Restrictions:Fall                 Patient's date of birth confirmed: Yes    SAFETY:  Safety Devices  Safety Devices in place: Yes  Type of devices: All fall risk precautions in place    SUBJECTIVE:\"Using the reacher with this arm (right) is going to get sore. \"       Pain at start of treatment: No    Pain at end of treatment: No    Location: N/A  Description N/A  Nursing notified: Not Applicable  RN: N/A  Intervention: Repositioned    COGNITION:         Patient's cognition will improve or maintain baseline to safely perform ADLs:    OBJECTIVE:      Pt completed picking up bean bags from floor with use of cane and reacher to  bags and place in bucket with supervision for balance. Pt seated at table completed bolts and blocks design x 2 to increase bilateral UE function for task completion. Education:N/A       Equipment recommendations:N/A         ASSESSMENT:Pt exhibiting increased tolerance for stand acts and good balance with use of cane. PLAN OF CARE:  Strengthening, ROM, Functional mobility training, Endurance training, Pain management, Safety education & training, Patient/Caregiver education & training, Equipment evaluation, education, & procurement, Self-Care / ADL       Patient goals : \"To get out of bed by myself. \"  Long Term Goal 1: Pt will be Mod IND with UB dressing. Long Term Goal 2: Pt will be Mod IND with LB dressing. Long Term Goal 3: Pt will be Mod IND with functional mobility.         Therapy Time:   Individual Group Co-Treat   Time In 1030       Time Out 1100         Minutes 30                   Therapeutic
Occupational Therapy Get up and Go Note            Date: 2023  Patient Name: Ron Uribe        MRN: [id number]    Account #: [de-identified]  : 1934  (80 y.o.)      Subjective:  Patient states:  I spilled stuff again. I have to go to the bathroom  Pain:  Pain at start of treatment: Yes: Pt. unable to rate pain-      Pain at end of treatment: Yes: Pt. unable to rate pain-      Location: L shoulder did not rate  Nursing notified: Declined      Objective:  ADL:  Grooming:  SUP  Toileting:  SBA  Toilet transfers:  SUP         Treatment consisted of:   [x] ADL Training  [] Strengthening   [] Transfer Training    [] DME Education  [] HEP   [] Patient Education  [] Other:    Safety:  Safety Devices  Safety Devices in place:   Type of devices:  All fall risk precautions in place      Therapy Time:   Individual Group Co-Treat   Time In 725       Time Out 0743         Minutes 18             ADL/IADL trainin minutes         Electronically signed by:    Charo Harrison OT    2023, 8:20 AM
Patient resting comfortably in bed. Bed alarm is on, call light is within reach. Patient denies pain at this time. Will continue to monitor.
Physical Therapy Rehab Treatment Note  [facility]  [facility]       NAME: Lisa Rogel  : 1934 (71 y.o.)  MRN: [id number]  CODE STATUS: Full Code    Date of Service: 2023       Restrictions:  Restrictions/Precautions: Fall Risk       SUBJECTIVE:   Subjective: I'm ready to go home. Pain  Pain: Pre: 0/10and post treatment: 4/10. Declined intervention. OBJECTIVE:                  Sit to Stand  Assistance Level: Modified independent  Stand to Sit  Assistance Level: Modified independent    Ambulation  Surface: Level surface  Device: Cane  Distance: 150' eiyh yutnd  Activity: Within Unit  Activity Comments: Steady 2 point gait. Assistance Level: Modified independent  Gait Deviations: Decreased arm swing left              Neuromuscular Education  Neuromuscular Comments: TUG retested: goal met finally. NO AD. Outcomes Measures:  Timed Up and Go: 13.4     s  PT Exercises  Resistive Exercises: MRE's: ABD/ADD/FLEX/EXT x 20                        ASSESSMENT/PROGRESS TOWARDS GOALS:   Assessment: Pt to discharge to home. Pt still denying need for HEP. Pt finally met last goal: TUG. Goals:  Long Term Goals  Long Term Goal 1: Pt to complete bed mobility with indep - MET  Long Term Goal 2: Pt to complete transfers with indep - MET  Long Term Goal 3: Pt to ambulate 50-150ft with LRD and indep - MET  Long Term Goal 4: Pt to manage 5 steps with HR and indep - MET  Long Term Goal 5: Pt to complete TUG within 13.5sec to demo safe mobility with decreased falls risk. - MET. Patient Goals   Patient Goals : to be indep at home - pt feels goal has been met. PLAN OF CARE/Safety:   Safety Devices  Type of Devices: Chair alarm in place; Left in chair      Therapy Time:   Individual   Time In 1000   Time Out 1030   Minutes 30      Minutes:30  Transfer/Bed mobility trainin  Gait trainin  Neuro re education: 22  Therapeutic ex: 0      Keily Gutierrez PTA, 23 at 12:22 PM
Physical Therapy Rehab Treatment Note  Facility/Department: [facility]  Room: [facility]/R249-       NAME: Chio Courser  : 1934 (3 y.o.)  MRN: [id number]  CODE STATUS: Full Code    Date of Service: 2023       Restrictions:  Restrictions/Precautions: Fall Risk       SUBJECTIVE:   Subjective: I feel like I'm ready to go home. Pain  Pain: Pre treatment: 0/10. OBJECTIVE:             Bed Mobility  Additional Factors: Head of bed flat; Without handrails; With handrails  Roll Left  Assistance Level: Modified independent  Roll Right  Assistance Level: Modified independent  Sit to Supine  Assistance Level: Modified independent  Supine to Sit  Assistance Level: Modified independent  Scooting  Assistance Level: Modified independent    Transfers  Surface: To bed;From bed; Wheelchair;Raised toilet Seat  Additional Factors: Increased time to complete  Device: Walker  Sit to Stand  Assistance Level: Modified independent  Stand to Sit  Assistance Level: Modified independent  Bed To/From Chair  Technique: Stand step  Assistance Level: Modified independent  Stand Pivot  Assistance Level: Modified independent    Ambulation  Surface: Level surface; Uneven surface  Device: Cane  Distance: 250' with turns  Activity: Within Unit  Activity Comments: mild to moderate dyspnea post gait with longer gait distance. Additional Factors: Increased time to complete  Assistance Level: Modified independent  Gait Deviations: Decreased step length bilateral  Skilled Clinical Factors: 2 point gait pattern. Stairs  Stair Height: 6''  Device: Bilateral handrails  Number of Stairs: 12  Additional Factors: Non-reciprocal going up;Non-reciprocal going down; Increased time to complete  Assistance Level: Modified independent  Curb  Curb Height: 4''  Device: Cane  Number of Curbs: 1  Assistance Level: Modified independent         Neuromuscular Education  Neuromuscular Comments: TUG retested.   Picking up cane from floor independently and
Physical Therapy Rehab Treatment Note  Facility/Department: [facility]  Room: R249R249-01       NAME: Columba   : 1934 (44 y.o.)  MRN: [id number]  CODE STATUS: Full Code    Date of Service: 2023       Restrictions:  Restrictions/Precautions: Fall Risk       SUBJECTIVE:   Subjective: I had to go to the bathroom and nobody came. I took myself. Pain  Pain: Pre and post treatment: 0/10. OBJECTIVE:                 Transfers  Surface: To bed;From bed; Wheelchair;Raised toilet Seat  Additional Factors: Increased time to complete  Device: Walker  Sit to Stand  Assistance Level: Modified independent  Stand to Sit  Assistance Level: Modified independent  Bed To/From Chair  Technique: Stand step  Assistance Level: Modified independent                   PT Exercises  A/AROM Exercises: Seated: LAQ, Side kicks,  Dynamic Standing Balance Exercises: Standing exercises: Heel raises, marching, ABD, HIp Ext, knee curls, squats: x 20. Activity Tolerance  Activity Tolerance: Patient tolerated treatment well             ASSESSMENT/PROGRESS TOWARDS GOALS:   Assessment  Assessment: PM focus on exercises only, but pt still denied needing copies of HEP. Activity Tolerance: Patient tolerated treatment well  Discharge Recommendations: Home independently; Patient would benefit from continued therapy after discharge; Outpatient PT    Goals:  Long Term Goals  Long Term Goal 1: Pt to complete bed mobility with indep - MET  Long Term Goal 2: Pt to complete transfers with indep - MET  Long Term Goal 3: Pt to ambulate 50-150ft with LRD and indep - MET  Long Term Goal 4: Pt to manage 5 steps with HR and indep - MET  Long Term Goal 5: Pt to complete TUG within 13.5sec to demo safe mobility with decreased falls risk. - almost MET: best score was 13.9 seconds. Patient Goals   Patient Goals : to be indep at home - pt feels goal has been met.     PLAN OF CARE/Safety:   Safety Devices  Type of Devices: Chair alarm in
Subjective: The patient complains of  moderate to severe acute  Fatigue and dyspnea on exertion partially relieved by rest, PT, OT, titrating nasal cannula O2 and anticoagulation and exacerbated by recent illness and exertion. He is recovering from recent illness including a right pulmonary embolism. Patient was admitted through the ER at [facility] on May 8, 2023 after several falls at home. He admitted to striking his head during one of the falls. He was brought in via EMS after the family called 911. Upon arrival he was complaining of shortness of breath. He was eventually found to have had a-Right-sided pulmonary emboli. He is now on therapeutic dose of Lovenox. He was admitted to the care of the hospitalist with pulmonology consulting. Pulmonology assessed him and found that he had the right upper lobe pulmonary embolism as well as a right upper lobe cavity pneumonia rule out aspiration. They advised continuing antibiotic rochephen. Titration of O2    I am concerned about patients medical complexities and current active problems and barriers to progress including dyspnea on exertion and hemoptysis. I am concerned about high diastolic blood pressure. I discussed current functional, rehabilitation, medical status with other rehabilitation providers including nursing and case management. According to recent nursing note, \" Patient resting comfortably in bed. Bed alarm is on, call light is within reach. Patient denies pain at this time. Will continue to monitor\". His skin irritation on his shoulder is lessening. His shoulder pain is lessening. He continues on IV ceftriaxone. ROS x10: The patient also complains of severely impaired mobility and activities of daily living. Otherwise no new problems with vision, hearing, nose, mouth, throat, dermal, cardiovascular, GI, , pulmonary, musculoskeletal, psychiatric or neurological. See Rehab H&P on Rehab chart dated .        Vital
cane)  Transfer Assistance: Independent  Active : No  Patient's  Info: daughter  Mode of Transportation: Car  Education: 11th grade  Occupation: Retired  Type of Occupation: [employer]    Current Functional Status:  ADL  Feeding: Independent  Grooming: Modified independent   UE Bathing: Minimal assistance  LE Bathing: Minimal assistance  UE Dressing: Stand by assistance  LE Dressing: Minimal assistance  Toileting: Modified independent     Toilet Transfers  Toilet - Technique: Ambulating  Equipment Used: Grab bars  Toilet Transfer: Modified independent     Shower Transfers  Shower - Transfer From: Rachel Guerra - Transfer Type: To  Shower - Transfer To: Shower seat with back  Shower - Technique: Ambulating  Shower Transfers: Supervision    Orientation Status:   WFL    Cognition Status:  Cognition  Overall Cognitive Status: Exceptions  Arousal/Alertness: Appropriate responses to stimuli  Following Commands:  Follows one step commands consistently  Attention Span: Attends with cues to redirect  Memory: Appears intact  Safety Judgement: Good awareness of safety precautions  Problem Solving: Assistance required to generate solutions, Assistance required to implement solutions, Assistance required to identify errors made  Insights: Fully aware of deficits  Initiation: Requires cues for all  Sequencing: Requires cues for some  Cognition Comment: Patient slightly tangential throughout the session    Perception Status:  Perception  Overall Perceptual Status: WFL    Sensation Status:  Sensation  Overall Sensation Status: Impaired (decreased sensation B feet)    Vision and Hearing Status:  Vision  Vision: Within Functional Limits  Vision Exceptions: Wears glasses for reading  Hearing  Hearing: Exceptions to [facility]  Hearing Exceptions: Hard of hearing/hearing concerns, Bilateral hearing aid     UE Function Status:    ROM:   LUE AROM (degrees)  LUE General AROM: IMP shoulder flexion and IR  Left Hand AROM
recommendations:sock aid         ASSESSMENT:Pt making gains to increase independence with ADL self care. PLAN OF CARE:  Strengthening, ROM, Functional mobility training, Endurance training, Pain management, Safety education & training, Patient/Caregiver education & training, Equipment evaluation, education, & procurement, Self-Care / ADL       Patient goals : \"To get out of bed by myself. \"  Long Term Goal 1: Pt will be Mod IND with UB dressing. Long Term Goal 2: Pt will be Mod IND with LB dressing. Long Term Goal 3: Pt will be Mod IND with functional mobility.         Therapy Time:   Individual Group Co-Treat   Time In 1100       Time Out 1200         Minutes 60                   ADL/IADL trainin minutes     Electronically signed by:    SANDY Dillard/L,   , 2:21 PM Electronically signed by SANDY Dillard/L on  at 2:25 PM EDT
syndrome, Elevated PSA, Heart disease, High blood pressure, History of BPH, History of pneumonia, History of UTI, and Sinus trouble. Social History:   reports that he has quit smoking. His smoking use included cigarettes. He has a 30.00 pack-year smoking history. He has never used smokeless tobacco. He reports that he does not drink alcohol and does not use drugs. Family History: History reviewed. No pertinent family history. Physical Examination      Vitals:  /78   Pulse 80   Temp 98.4 °F (36.9 °C) (Oral)   Resp 17   Ht 6' (1.829 m)   Wt 221 lb 3.2 oz (100.3 kg)   SpO2 94%   BMI 30.00 kg/m²   Temp (24hrs), Av.2 °F (36.8 °C), Min:98 °F (36.7 °C), Max:98.4 °F (36.9 °C)      I/O (24Hr): Intake/Output Summary (Last 24 hours) at 2023 1016  Last data filed at 2023 1450  Gross per 24 hour   Intake 38.92 ml   Output --   Net 38.92 ml         CONSTITUTIONAL:  Awake, alert, no apparent distress, and appears stated age  EYES: pupils equal, round and reactive to light   NECK:  Supple   LUNGS:  No increased work of breathing, good air exchange, clear to auscultation bilaterally, no crackles or wheezing  CARDIOVASCULAR:  Normal apical impulse, regular rate and rhythm  ABDOMEN:  No scars, normal bowel sounds, soft, non-distended, non-tender  MUSCULOSKELETAL:  There is no redness, warmth, or swelling of the joints. Full range of motion noted  NEUROLOGIC:  Awake, alert. No focal deficits noted  SKIN: Light bruising to left hip , normal skin color, texture, turgor, no redness, warmth, or swelling, no rashes, incision to left hip; dressing intact    Labs/Imaging/Diagnostics   Labs:  CBC:No results for input(s): WBC, RBC, HGB, HCT, MCV, RDW, PLT in the last 72 hours. CHEMISTRIES:No results for input(s): NA, K, CL, CO2, BUN, CREATININE, GLUCOSE, CA, PHOS, MG in the last 72 hours. PT/INR:No results for input(s): PROTIME, INR in the last 72 hours. APTT:No results for input(s):  APTT in the last

## 2023-06-08 ENCOUNTER — OFFICE VISIT (OUTPATIENT)
Dept: PULMONOLOGY | Age: 88
End: 2023-06-08
Payer: MEDICARE

## 2023-06-08 VITALS
HEART RATE: 78 BPM | OXYGEN SATURATION: 98 % | SYSTOLIC BLOOD PRESSURE: 138 MMHG | BODY MASS INDEX: 29.66 KG/M2 | HEIGHT: 72 IN | DIASTOLIC BLOOD PRESSURE: 76 MMHG | WEIGHT: 219 LBS | TEMPERATURE: 97.6 F

## 2023-06-08 DIAGNOSIS — I26.99 PULMONARY EMBOLISM, UNSPECIFIED CHRONICITY, UNSPECIFIED PULMONARY EMBOLISM TYPE, UNSPECIFIED WHETHER ACUTE COR PULMONALE PRESENT (HCC): ICD-10-CM

## 2023-06-08 DIAGNOSIS — R06.02 SHORTNESS OF BREATH: ICD-10-CM

## 2023-06-08 DIAGNOSIS — J18.1 LUNG CONSOLIDATION (HCC): Primary | ICD-10-CM

## 2023-06-08 PROCEDURE — 1123F ACP DISCUSS/DSCN MKR DOCD: CPT | Performed by: INTERNAL MEDICINE

## 2023-06-08 PROCEDURE — 99213 OFFICE O/P EST LOW 20 MIN: CPT | Performed by: INTERNAL MEDICINE

## 2023-06-08 PROCEDURE — G8419 CALC BMI OUT NRM PARAM NOF/U: HCPCS | Performed by: INTERNAL MEDICINE

## 2023-06-08 PROCEDURE — 1111F DSCHRG MED/CURRENT MED MERGE: CPT | Performed by: INTERNAL MEDICINE

## 2023-06-08 PROCEDURE — 1036F TOBACCO NON-USER: CPT | Performed by: INTERNAL MEDICINE

## 2023-06-08 PROCEDURE — G8427 DOCREV CUR MEDS BY ELIG CLIN: HCPCS | Performed by: INTERNAL MEDICINE

## 2023-06-08 NOTE — PROGRESS NOTES
weakness. Sensation normal   Psych: Normal Mood  Musculoskeletal: No joint abnormalities. LABS and Studies:    Radiology:  CT-scan of the chest  reviewed and interpreted personally and independently. Impression:   Diagnosis Orders   1. Lung consolidation ([address]  CT CHEST WO CONTRAST      2. Pulmonary embolism, unspecified chronicity, unspecified pulmonary embolism type, unspecified whether acute cor pulmonale present ([address]        3. Shortness of breath                Orders Placed This Encounter   Procedures    CT CHEST WO CONTRAST     Standing Status:   Future     Standing Expiration Date:   6/8/2024          Recommendations:     -He will need CT chest to follow-up on consolidation right upper lobe to assess for resolution.  -Continue anticoagulation. Probably duration will be 6 months   -Virtual visit after CT chest.      Return in about 4 weeks (around 7/6/2023). Phone visit.        Electronically signed by Katie Thornton MD on 6/8/2023 at 1:21 PM

## 2023-06-19 ENCOUNTER — HOSPITAL ENCOUNTER (OUTPATIENT)
Dept: CT IMAGING | Age: 88
Discharge: HOME | End: 2023-06-21
Payer: MEDICARE

## 2023-06-19 ENCOUNTER — HOSPITAL ENCOUNTER (OUTPATIENT)
Dept: LAB | Age: 88
Discharge: HOME | End: 2023-06-19
Payer: MEDICARE

## 2023-06-19 DIAGNOSIS — J18.1 LUNG CONSOLIDATION (HCC): ICD-10-CM

## 2023-06-19 LAB
BACTERIA URNS QL MICRO: NEGATIVE /HPF
BILIRUB UR QL STRIP: NEGATIVE
CLARITY UR: CLEAR
COLOR UR: YELLOW
EPI CELLS #/AREA URNS AUTO: NORMAL /HPF (ref 0–5)
GLUCOSE UR STRIP-MCNC: NEGATIVE MG/DL
HGB UR QL STRIP: ABNORMAL
HYALINE CASTS #/AREA URNS AUTO: NORMAL /HPF (ref 0–5)
KETONES UR STRIP-MCNC: NEGATIVE MG/DL
LEUKOCYTE ESTERASE UR QL STRIP: NEGATIVE
NITRITE UR QL STRIP: NEGATIVE
PH UR STRIP: 6 [PH] (ref 5–9)
PROT UR STRIP-MCNC: NEGATIVE MG/DL
RBC #/AREA URNS AUTO: NORMAL /HPF (ref 0–5)
SP GR UR STRIP: 1.01 (ref 1–1.03)
UROBILINOGEN UR STRIP-ACNC: 0.2 E.U./DL
WBC #/AREA URNS AUTO: NORMAL /HPF (ref 0–5)

## 2023-06-19 PROCEDURE — 81001 URINALYSIS AUTO W/SCOPE: CPT

## 2023-06-19 PROCEDURE — 71250 CT THORAX DX C-: CPT

## 2023-06-19 PROCEDURE — 87086 URINE CULTURE/COLONY COUNT: CPT

## 2023-06-21 ENCOUNTER — TELEPHONE (OUTPATIENT)
Dept: PULMONOLOGY | Age: 88
End: 2023-06-21

## 2023-06-21 LAB — BACTERIA UR CULT: NORMAL

## 2023-06-29 ENCOUNTER — OFFICE VISIT (OUTPATIENT)
Dept: PULMONOLOGY | Age: 88
End: 2023-06-29
Payer: MEDICARE

## 2023-06-29 VITALS
TEMPERATURE: 97.6 F | HEART RATE: 88 BPM | OXYGEN SATURATION: 97 % | SYSTOLIC BLOOD PRESSURE: 128 MMHG | DIASTOLIC BLOOD PRESSURE: 74 MMHG | BODY MASS INDEX: 29.66 KG/M2 | HEIGHT: 72 IN | WEIGHT: 219 LBS

## 2023-06-29 DIAGNOSIS — J98.4 CAVITARY LESION OF LUNG: ICD-10-CM

## 2023-06-29 DIAGNOSIS — I26.99 PULMONARY EMBOLISM, UNSPECIFIED CHRONICITY, UNSPECIFIED PULMONARY EMBOLISM TYPE, UNSPECIFIED WHETHER ACUTE COR PULMONALE PRESENT (HCC): ICD-10-CM

## 2023-06-29 DIAGNOSIS — J18.1 LUNG CONSOLIDATION (HCC): Primary | ICD-10-CM

## 2023-06-29 PROCEDURE — G8428 CUR MEDS NOT DOCUMENT: HCPCS | Performed by: INTERNAL MEDICINE

## 2023-06-29 PROCEDURE — 99213 OFFICE O/P EST LOW 20 MIN: CPT | Performed by: INTERNAL MEDICINE

## 2023-06-29 PROCEDURE — 1123F ACP DISCUSS/DSCN MKR DOCD: CPT | Performed by: INTERNAL MEDICINE

## 2023-06-29 PROCEDURE — G8419 CALC BMI OUT NRM PARAM NOF/U: HCPCS | Performed by: INTERNAL MEDICINE

## 2023-06-29 PROCEDURE — 1036F TOBACCO NON-USER: CPT | Performed by: INTERNAL MEDICINE

## 2023-07-05 ENCOUNTER — TELEPHONE (OUTPATIENT)
Dept: PULMONOLOGY | Age: 88
End: 2023-07-05

## 2023-07-05 NOTE — TELEPHONE ENCOUNTER
Patient's son Vashti Doan called and asked me questions about proposed bronchoscopy. He believes his father is not in a good condition to undergo anesthesia and biopsy and is not sure if it is truly a cancer that he will be able to tolerate treatment. I discussed with him the procedure again in details. Alternative option could be treatment with antibiotic for a week and repeat CT in few weeks but this we will risk that the size of the mass might increase. He wants to talk to his sister and father and they will call us back for an official visit to rediscuss options.

## 2023-07-06 RX ORDER — LEVOFLOXACIN 500 MG/1
500 TABLET, FILM COATED ORAL DAILY
Qty: 7 TABLET | Refills: 0 | Status: SHIPPED | OUTPATIENT
Start: 2023-07-06 | End: 2023-07-13

## 2023-07-06 NOTE — TELEPHONE ENCOUNTER
I will send it now but please arrange for a phone visit tomorrow to discuss everything per my phone encounter from yesterday. The patient and the daughter needs to be on the phone.   Thanks

## 2023-07-06 NOTE — TELEPHONE ENCOUNTER
Patient's daughter called stating that he would like to start on the antibiotic.  Can you please send one to the pharmacy

## 2023-10-25 PROBLEM — H61.21 IMPACTED CERUMEN OF RIGHT EAR: Status: ACTIVE | Noted: 2023-10-25

## 2023-10-25 PROBLEM — M17.9 KNEE OSTEOARTHRITIS: Status: ACTIVE | Noted: 2023-10-25

## 2023-10-25 PROBLEM — H90.3 BILATERAL SENSORINEURAL HEARING LOSS: Status: ACTIVE | Noted: 2023-10-25

## 2023-10-25 PROBLEM — M19.019 OSTEOARTHRITIS, SHOULDER: Status: ACTIVE | Noted: 2023-10-25

## 2023-10-25 RX ORDER — LISINOPRIL 5 MG/1
5 TABLET ORAL DAILY
COMMUNITY

## 2023-10-25 RX ORDER — ATORVASTATIN CALCIUM 10 MG/1
10 TABLET, FILM COATED ORAL DAILY
COMMUNITY

## 2023-10-25 RX ORDER — LISINOPRIL 10 MG/1
10 TABLET ORAL DAILY
COMMUNITY

## 2023-10-25 RX ORDER — FLUTICASONE PROPIONATE 50 MCG
2 SPRAY, SUSPENSION (ML) NASAL DAILY
COMMUNITY

## 2023-10-25 RX ORDER — BUPROPION HYDROCHLORIDE 100 MG/1
100 TABLET ORAL DAILY
COMMUNITY

## 2023-10-25 RX ORDER — BUDESONIDE 0.25 MG/2ML
0.25 INHALANT ORAL
COMMUNITY

## 2023-10-25 RX ORDER — MONTELUKAST SODIUM 10 MG/1
10 TABLET ORAL NIGHTLY
COMMUNITY

## 2023-10-25 RX ORDER — FINASTERIDE 5 MG/1
5 TABLET, FILM COATED ORAL DAILY
COMMUNITY

## 2023-10-25 RX ORDER — ISOSORBIDE MONONITRATE 60 MG/1
60 TABLET, EXTENDED RELEASE ORAL DAILY
COMMUNITY

## 2023-10-25 RX ORDER — POTASSIUM CHLORIDE 20 MEQ/1
2 TABLET, EXTENDED RELEASE ORAL DAILY
COMMUNITY

## 2023-10-25 RX ORDER — FUROSEMIDE 20 MG/1
20 TABLET ORAL DAILY
COMMUNITY

## 2023-10-25 RX ORDER — CARVEDILOL 6.25 MG/1
6.25 TABLET ORAL
COMMUNITY
End: 2023-10-30 | Stop reason: ALTCHOICE

## 2023-10-27 PROBLEM — I50.9 CHF (CONGESTIVE HEART FAILURE) (MULTI): Status: ACTIVE | Noted: 2023-10-27

## 2023-10-27 PROBLEM — Z87.891 FORMER SMOKER: Status: ACTIVE | Noted: 2023-10-27

## 2023-10-27 PROBLEM — R29.6 FALLS: Status: ACTIVE | Noted: 2023-10-27

## 2023-10-27 PROBLEM — I25.10 CAD (CORONARY ATHEROSCLEROTIC DISEASE): Status: ACTIVE | Noted: 2023-10-27

## 2023-10-27 PROBLEM — R06.00 DYSPNEA: Status: ACTIVE | Noted: 2023-10-27

## 2023-10-27 PROBLEM — I10 HTN (HYPERTENSION): Status: ACTIVE | Noted: 2023-10-27

## 2023-10-27 PROBLEM — E66.3 OVERWEIGHT WITH BODY MASS INDEX (BMI) OF 28 TO 28.9 IN ADULT: Status: ACTIVE | Noted: 2023-10-27

## 2023-10-27 PROBLEM — I26.99 PULMONARY EMBOLISM (MULTI): Status: ACTIVE | Noted: 2023-10-27

## 2023-10-27 PROBLEM — Z79.01 LONG TERM (CURRENT) USE OF ANTICOAGULANTS: Status: ACTIVE | Noted: 2023-10-27
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
## 2023-10-27 RX ORDER — DEXTROMETHORPHAN HYDROBROMIDE, GUAIFENESIN 5; 100 MG/5ML; MG/5ML
650 LIQUID ORAL 2 TIMES DAILY PRN
COMMUNITY

## 2023-10-27 RX ORDER — POLYETHYLENE GLYCOL 3350 17 G/17G
17 POWDER, FOR SOLUTION ORAL DAILY
COMMUNITY

## 2023-10-30 ENCOUNTER — OFFICE VISIT (OUTPATIENT)
Dept: CARDIOLOGY | Facility: CLINIC | Age: 88
End: 2023-10-30
Payer: MEDICARE

## 2023-10-30 VITALS
SYSTOLIC BLOOD PRESSURE: 135 MMHG | WEIGHT: 228 LBS | DIASTOLIC BLOOD PRESSURE: 78 MMHG | BODY MASS INDEX: 30.92 KG/M2 | HEART RATE: 74 BPM

## 2023-10-30 DIAGNOSIS — I10 HYPERTENSION, UNSPECIFIED TYPE: Primary | ICD-10-CM

## 2023-10-30 PROCEDURE — 1036F TOBACCO NON-USER: CPT | Performed by: INTERNAL MEDICINE

## 2023-10-30 PROCEDURE — 3078F DIAST BP <80 MM HG: CPT | Performed by: INTERNAL MEDICINE

## 2023-10-30 PROCEDURE — 3075F SYST BP GE 130 - 139MM HG: CPT | Performed by: INTERNAL MEDICINE

## 2023-10-30 PROCEDURE — 1159F MED LIST DOCD IN RCRD: CPT | Performed by: INTERNAL MEDICINE

## 2023-10-30 PROCEDURE — 99214 OFFICE O/P EST MOD 30 MIN: CPT | Performed by: INTERNAL MEDICINE

## 2023-10-30 RX ORDER — CARVEDILOL 12.5 MG/1
12.5 TABLET ORAL
Qty: 180 TABLET | Refills: 3 | Status: SHIPPED | OUTPATIENT
Start: 2023-10-30 | End: 2024-10-29

## 2023-10-30 ASSESSMENT — ENCOUNTER SYMPTOMS
DEPRESSION: 0
LOSS OF SENSATION IN FEET: 0
OCCASIONAL FEELINGS OF UNSTEADINESS: 0

## 2023-10-30 NOTE — PROGRESS NOTES
27.  CARDIOLOGY OFFICE NOTE    Date:   10/30/2023    Patient:    Abhishek Abel    YOB: 1934    Primary Physician: Ha Ahuja MD       Reason for Visit: 3 mo follow up      HPI:     Abhishek Abel was seen in cardiac evaluation at the  Cardiology office October 30, 2023.      The patients problems are listed as in the impression below.    Electronic medical records reviewed.    Patient returns.  He feels well.  He is asymptomatic overall.  He has no complaints.  He is active without limitations.    Patient denies Chest Pain, SOB, Lightheadedness, Dizziness, TIA or CVA symptoms.  No CHF or Edema.  No Palpitations.  No GI,  or Bleeding Issues. No Recent Fever or Chills.     Cardiovascular and general review of systems is otherwise negative.    A 14-system review is otherwise negative, other than noted.     PHYSICAL EXAMINATION:      Vitals:    10/30/23 1533   BP: 135/78   Pulse: 74     General: No acute distress. Vital signs as noted. Alert and oriented.  Head And Neck Examination: No jugular venous distention, no carotid bruits, no mass. Carotid upstrokes preserved. Oral mucosa moist. No xanthelasma. Head and neck examination otherwise unremarkable.  Lungs: Clear to auscultation and percussion. No wheezes, no rales, and no rhonchi.  Chest: Excursion appeared to be normal. No chest wall tenderness on palpation.  Heart: Normal S1 and S2. No S3. No S4. No rub. Grade 1/6 systolic murmur, best heard at the left sternal border. Point of maximal impulse was within normal limits.  Abdomen: Soft. Nontender. No organomegaly. No bruits. No masses. Obese.  Extremities: No bipedal edema. No clubbing. No cyanosis. Pulses are strong throughout. No bruits.  Musculoskeletal Exam: No ulcers, otherwise unremarkable.  Neuro: Neurologically appeared grossly intact.  .  IMPRESSION:      Cardiovascular status stable  Asymptomatic  Shortness of breath, resolved  Falls, mechanical  Pulmonary embolism right  5/2023  Long-term Eliquis  Pneumonia  Coronary artery disease with negative Myoview perfusion stress test 5/2023  Normal left ventricular function, LVEF 72%  Hypertension  Hyperlipidemia  Reported congestive heart failure  Obesity  Degenerative joint disease  Past smoker  Otherwise as per assessment below.    RECOMMENDATIONS:      Patient continues to do well overall.  Would suggest continuing current medications with increase of Coreg to 12.5 mg twice daily.  Patient refills were provided.    Exercise dietary program.  Hydration.    [employer] portal use was encouraged.    We will plan to see back in 4 months with Laboratory Studies and ECG as ordered.     Patient will follow up with their primary physician for general care.    The patient knows to contact medical care earlier if need be.      ALLERGIES:     Allergies   Allergen Reactions    Penicillins Unknown        MEDICATIONS:     Current Outpatient Medications   Medication Instructions    acetaminophen (TYLENOL 8 HOUR) 650 mg, oral, 2 times daily PRN, Do not crush, chew, or split.    apixaban (ELIQUIS) 5 mg, oral, 2 times daily    aspirin 81 mg capsule 1 capsule, oral, Daily    atorvastatin (LIPITOR) 10 mg, oral, Daily    budesonide (PULMICORT) 0.25 mg, nebulization, Daily RT, Rinse mouth with water after use to reduce aftertaste and incidence of candidiasis. Do not swallow.    buPROPion (WELLBUTRIN) 100 mg, oral, Daily    carvedilol (COREG) 6.25 mg, oral, 2 times daily with meals    finasteride (PROSCAR) 5 mg, oral, Daily    fluticasone (Flonase) 50 mcg/actuation nasal spray 2 sprays, nasal, Daily    furosemide (LASIX) 20 mg, oral, Daily    isosorbide mononitrate ER (IMDUR) 60 mg, oral, Daily    lisinopril 10 mg tablet TAKE 1 TABLET ALONG WITH 5MG TABLET    lisinopril 5 mg tablet TAKE 1 TABLET ALONG WITH 10 MG TABLET    montelukast (SINGULAIR) 10 mg, oral, Nightly    polyethylene glycol (MIRALAX) 17 g, oral, Daily, Mix 1 capful (17GM) in 8 ounces of water, juice  or tea    potassium chloride CR (Klor-Con M20) 20 mEq ER tablet 2 tablets, oral, Daily, Do not crush or chew.       ELECTROCARDIOGRAM:      None    CARDIAC TESTING:      None    LABORATORY DATA:      No recent available for review.      All above testing was personally reviewed.    PROBLEM LIST:     Patient Active Problem List   Diagnosis    Bilateral sensorineural hearing loss    Impacted cerumen of right ear    Knee osteoarthritis    Osteoarthritis, shoulder    CAD (coronary atherosclerotic disease)    CHF (congestive heart failure) (CMS/[facility])    Dyspnea    Falls    Former smoker    HTN (hypertension)    Long term (current) use of anticoagulants    Overweight with body mass index (BMI) of 28 to 28.9 in adult    Pulmonary embolism (CMS/[facility])             Dar Talbert MD, [facility] / [facility] /  Cardiology      Of Note:  Our Security Team voice recognition dictation software was utilized partially in the preparation of this note, therefore, inaccuracies in spelling, word choice and punctuation may have occurred which were not recognized the time of signing.    ----

## 2023-12-11 ENCOUNTER — CLINICAL SUPPORT (OUTPATIENT)
Dept: AUDIOLOGY | Facility: CLINIC | Age: 88
End: 2023-12-11
Payer: MEDICARE

## 2023-12-11 DIAGNOSIS — H90.3 BILATERAL SENSORINEURAL HEARING LOSS: Primary | ICD-10-CM

## 2023-12-11 PROCEDURE — V5014 HEARING AID REPAIR/MODIFYING: HCPCS | Performed by: AUDIOLOGIST

## 2023-12-11 NOTE — PROGRESS NOTES
Mr. Abel's daughter was seen today on behalf of the patient to have his binaural Phonak Audeo B50-13 hearing aids checked with reports that the right one has not been functioning correctly.    Procedure:  Hearing aids were checked and cleaned.  Right  was replaced and right device returned to good, working status.  Payment for out of warranty  replacement was accepted in full.  They will return as needed.

## 2023-12-15 ENCOUNTER — TELEPHONE (OUTPATIENT)
Dept: ORTHOPEDIC SURGERY | Facility: CLINIC | Age: 88
End: 2023-12-15
Payer: MEDICARE

## 2023-12-15 NOTE — TELEPHONE ENCOUNTER
Left  vm for pt that there will be no pain meds given, if they need pain medication we caan send him to pain mgmt

## 2024-01-05 ENCOUNTER — OFFICE VISIT (OUTPATIENT)
Dept: ORTHOPEDIC SURGERY | Facility: CLINIC | Age: 89
End: 2024-01-05
Payer: MEDICARE

## 2024-01-05 DIAGNOSIS — M17.11 PRIMARY OSTEOARTHRITIS OF RIGHT KNEE: ICD-10-CM

## 2024-01-05 DIAGNOSIS — M19.011 PRIMARY OSTEOARTHRITIS OF RIGHT SHOULDER: ICD-10-CM

## 2024-01-05 PROCEDURE — 1036F TOBACCO NON-USER: CPT | Performed by: ORTHOPAEDIC SURGERY

## 2024-01-05 PROCEDURE — 2500000005 HC RX 250 GENERAL PHARMACY W/O HCPCS: Performed by: ORTHOPAEDIC SURGERY

## 2024-01-05 PROCEDURE — 1159F MED LIST DOCD IN RCRD: CPT | Performed by: ORTHOPAEDIC SURGERY

## 2024-01-05 PROCEDURE — 20610 DRAIN/INJ JOINT/BURSA W/O US: CPT | Mod: RT | Performed by: ORTHOPAEDIC SURGERY

## 2024-01-05 PROCEDURE — 99214 OFFICE O/P EST MOD 30 MIN: CPT | Performed by: ORTHOPAEDIC SURGERY

## 2024-01-05 PROCEDURE — 2500000004 HC RX 250 GENERAL PHARMACY W/ HCPCS (ALT 636 FOR OP/ED): Performed by: ORTHOPAEDIC SURGERY

## 2024-01-05 RX ORDER — LIDOCAINE HYDROCHLORIDE 10 MG/ML
8 INJECTION INFILTRATION; PERINEURAL
Status: COMPLETED | OUTPATIENT
Start: 2024-01-05 | End: 2024-01-05

## 2024-01-05 RX ORDER — TRIAMCINOLONE ACETONIDE 40 MG/ML
40 INJECTION, SUSPENSION INTRA-ARTICULAR; INTRAMUSCULAR
Status: COMPLETED | OUTPATIENT
Start: 2024-01-05 | End: 2024-01-05

## 2024-01-05 RX ORDER — CELECOXIB 200 MG/1
200 CAPSULE ORAL DAILY
Qty: 30 CAPSULE | Refills: 0 | Status: SHIPPED | OUTPATIENT
Start: 2024-01-05 | End: 2024-02-04

## 2024-01-05 RX ORDER — TRAMADOL HYDROCHLORIDE 50 MG/1
50 TABLET ORAL EVERY 8 HOURS PRN
Qty: 15 TABLET | Refills: 0 | Status: SHIPPED | OUTPATIENT
Start: 2024-01-05 | End: 2024-01-12

## 2024-01-05 RX ADMIN — TRIAMCINOLONE ACETONIDE 40 MG: 40 INJECTION, SUSPENSION INTRA-ARTICULAR; INTRAMUSCULAR at 11:30

## 2024-01-05 RX ADMIN — LIDOCAINE HYDROCHLORIDE 8 ML: 10 INJECTION, SOLUTION INFILTRATION; PERINEURAL at 11:30

## 2024-01-05 NOTE — PROGRESS NOTES
History of Present Illness   Chief Complaint   Patient presents with    Right Shoulder - Follow-up     PAIN  S/P INJECTION ON 06-26-23    Right Knee - Follow-up     PAIN  S/P INJECTION ON 06-26-23       History of Present Illness   Patient presents today he had multiple years of ongoing shoulder and knee pain.  He was treated conservatively in the past with repeat cortisone injections.  He is a somewhat limited in terms of medications.  Is currently on Eliquis.  Is been cautioned against NSAIDs.  He understands only long-term surgical options are replacement however with his complex medical history he is continue with conservative treatment and repeating a cortisone injections    Imaging  Knee: Radiographs  Severe degenerative joint disease grade 4 medial  Severe right shoulder arthritis end-stage     Assessment:   Right knee end-stage tricompartment arthritis  Right knee end-stage glenohumeral arthritis    Plan:  We reviewed the role of imaging, physical therapy, injections and the time frame to healing and correlation with outcome.  1.  Recommended cortisone injection to the shoulder knee today  2.  NSAID: Celebrex sent to the pharmacy.  GI side effects and medical risks discussed he will discuss with his treating cardiologist in regards to his anticoagulant and associated risks before taking it  3.  Ice: 30 minutes on and off  4.  Exercise home program: Medically directed knee therapy / Handout given  5.  I tramadol for pain relief, we discussed dosing of pain management if he needs a refill    L Inj/Asp: R knee on 1/5/2024 11:30 AM  Indications: pain  Details: 22 G needle, anterolateral approach  Medications: 8 mL lidocaine 10 mg/mL (1 %); 40 mg triamcinolone acetonide 40 mg/mL  Outcome: tolerated well, no immediate complications  Procedure, treatment alternatives, risks and benefits explained, specific risks discussed. Consent was given by the patient. Immediately prior to procedure a time out was called to  verify the correct patient, procedure, equipment, support staff and site/side marked as required. Patient was prepped and draped in the usual sterile fashion.       L Inj/Asp: R glenohumeral on 1/5/2024 11:30 AM  Indications: pain  Details: 22 G needle, posterior approach  Medications: 8 mL lidocaine 10 mg/mL (1 %); 40 mg triamcinolone acetonide 40 mg/mL  Outcome: tolerated well, no immediate complications  Procedure, treatment alternatives, risks and benefits explained, specific risks discussed. Consent was given by the patient. Immediately prior to procedure a time out was called to verify the correct patient, procedure, equipment, support staff and site/side marked as required. Patient was prepped and draped in the usual sterile fashion.             Physical Exam  Well-nourished, well-developed. No acute distress. Alert and oriented x3. Responds appropriately to questioning. Good mood. Normal affect.  Physical Exam  Right knee:  Skin healthy and intact  No gross swelling or ecchymosis  Trace Effusion:   ROM: 110  Crepitance with range of motion  No pain with internal rotation of the hip  Tenderness to palpation over medial and lateral joint line and with patellar compression      Right shoulder: 5 out of 5 internal rotation strength.,  External rotation to 20.  Moderate pain impingement and forward flexion.     Review of Systems   GENERAL: Negative for malaise, significant weight loss, fever  MUSCULOSKELETAL: See HPI  NEURO:  Negative     History reviewed. No pertinent past medical history.    Medication Documentation Review Audit       Reviewed by Khushi Black on 01/05/24 at 1112      Medication Order Taking? Sig Documenting Provider Last Dose Status   acetaminophen (Tylenol 8 HOUR) 650 mg ER tablet [id number] No Take 1 tablet (650 mg) by mouth 2 times a day as needed. Do not crush, chew, or split. Historical Provider, MD Taking Differently Active   apixaban (Eliquis) 5 mg tablet [id number] No Take 1 tablet (5  mg) by mouth 2 times a day. Becki Zee MD Taking Active   aspirin 81 mg capsule [id number] No Take 1 capsule by mouth once daily. Becki Zee MD Taking Active   atorvastatin (Lipitor) 10 mg tablet [id number] No Take 1 tablet (10 mg) by mouth once daily. Becki Zee MD Taking Active   budesonide (Pulmicort) 0.25 mg/2 mL nebulizer solution [id number] No Take 2 mL (0.25 mg) by nebulization once daily. Rinse mouth with water after use to reduce aftertaste and incidence of candidiasis. Do not swallow. Becki Zee MD Taking Active   buPROPion (Wellbutrin) 100 mg tablet [id number] No Take 1 tablet (100 mg) by mouth once daily. Becki Zee MD Taking Active   carvedilol (Coreg) 12.5 mg tablet [id number]  Take 1 tablet (12.5 mg) by mouth 2 times a day with meals. Dar Talbert MD  Active   finasteride (Proscar) 5 mg tablet [id number] No Take 1 tablet (5 mg) by mouth once daily. Becki Zee MD Taking Active   fluticasone (Flonase) 50 mcg/actuation nasal spray [id number] No Administer 2 sprays into affected nostril(s) once daily. Becki Zee MD Taking Active   furosemide (Lasix) 20 mg tablet [id number] No Take 1 tablet (20 mg) by mouth once daily. Becki Zee MD Taking Active   isosorbide mononitrate ER (Imdur) 60 mg 24 hr tablet [id number] No Take 1 tablet (60 mg) by mouth once daily. Becki Zee MD Taking Active   lisinopril 10 mg tablet [id number] No TAKE 1 TABLET ALONG WITH 5MG TABLET Becki Zee MD Taking Active   lisinopril 5 mg tablet [id number] No TAKE 1 TABLET ALONG WITH 10 MG TABLET Becki Zee MD Taking Active   montelukast (Singulair) 10 mg tablet [id number] No Take 1 tablet (10 mg) by mouth once daily at bedtime. Becki Zee MD Taking Active   polyethylene glycol (Miralax) 17 gram/dose powder [id number] No Take 17 g by mouth once daily. Mix 1 capful (17GM) in 8 ounces of water, juice or tea Becki Zee MD  Taking Active   potassium chloride CR (Klor-Con M20) 20 mEq ER tablet [id number] No Take 2 tablets (40 mEq) by mouth once daily. Do not crush or chew. Historical Provider, MD Taking Active                    Allergies   Allergen Reactions    Penicillins Unknown       Social History     Socioeconomic History    Marital status:      Spouse name: Not on file    Number of children: Not on file    Years of education: Not on file    Highest education level: Not on file   Occupational History    Not on file   Tobacco Use    Smoking status: Former     Types: Cigarettes    Smokeless tobacco: Never   Substance and Sexual Activity    Alcohol use: Never    Drug use: Never    Sexual activity: Not on file   Other Topics Concern    Not on file   Social History Narrative    Not on file     Social Determinants of Health     Financial Resource Strain: Not on file   Food Insecurity: Not on file   Transportation Needs: Not on file   Physical Activity: Not on file   Stress: Not on file   Social Connections: Not on file   Intimate Partner Violence: Not on file   Housing Stability: Not on file       History reviewed. No pertinent surgical history.    No results found.

## 2024-02-26 ENCOUNTER — APPOINTMENT (OUTPATIENT)
Dept: CARDIOLOGY | Facility: CLINIC | Age: 89
End: 2024-02-26
Payer: MEDICARE

## 2024-03-04 ENCOUNTER — APPOINTMENT (OUTPATIENT)
Dept: CARDIOLOGY | Facility: CLINIC | Age: 89
End: 2024-03-04
Payer: MEDICARE

## 2024-03-11 ENCOUNTER — OFFICE VISIT (OUTPATIENT)
Dept: CARDIOLOGY | Facility: CLINIC | Age: 89
End: 2024-03-11
Payer: MEDICARE

## 2024-03-11 VITALS
HEART RATE: 67 BPM | BODY MASS INDEX: 31.69 KG/M2 | HEIGHT: 72 IN | SYSTOLIC BLOOD PRESSURE: 122 MMHG | WEIGHT: 234 LBS | DIASTOLIC BLOOD PRESSURE: 66 MMHG
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
## 2024-03-11 DIAGNOSIS — I50.9 CONGESTIVE HEART FAILURE, UNSPECIFIED HF CHRONICITY, UNSPECIFIED HEART FAILURE TYPE (MULTI): ICD-10-CM

## 2024-03-11 DIAGNOSIS — I10 PRIMARY HYPERTENSION: Primary | ICD-10-CM

## 2024-03-11 DIAGNOSIS — R06.00 DYSPNEA, UNSPECIFIED TYPE: ICD-10-CM

## 2024-03-11 DIAGNOSIS — I26.93 SINGLE SUBSEGMENTAL PULMONARY EMBOLISM WITHOUT ACUTE COR PULMONALE (MULTI): ICD-10-CM

## 2024-03-11 DIAGNOSIS — I25.10 ATHEROSCLEROSIS OF NATIVE CORONARY ARTERY OF NATIVE HEART WITHOUT ANGINA PECTORIS: ICD-10-CM

## 2024-03-11 DIAGNOSIS — Z87.891 FORMER SMOKER: ICD-10-CM

## 2024-03-11 DIAGNOSIS — Z79.01 LONG TERM (CURRENT) USE OF ANTICOAGULANTS: ICD-10-CM

## 2024-03-11 DIAGNOSIS — E66.3 OVERWEIGHT WITH BODY MASS INDEX (BMI) OF 28 TO 28.9 IN ADULT: ICD-10-CM

## 2024-03-11 DIAGNOSIS — I10 HYPERTENSION, UNSPECIFIED TYPE: ICD-10-CM

## 2024-03-11 PROCEDURE — 99213 OFFICE O/P EST LOW 20 MIN: CPT | Performed by: INTERNAL MEDICINE

## 2024-03-11 PROCEDURE — 3074F SYST BP LT 130 MM HG: CPT | Performed by: INTERNAL MEDICINE

## 2024-03-11 PROCEDURE — 1159F MED LIST DOCD IN RCRD: CPT | Performed by: INTERNAL MEDICINE

## 2024-03-11 PROCEDURE — 3078F DIAST BP <80 MM HG: CPT | Performed by: INTERNAL MEDICINE

## 2024-03-11 PROCEDURE — 1036F TOBACCO NON-USER: CPT | Performed by: INTERNAL MEDICINE

## 2024-03-11 PROCEDURE — 93000 ELECTROCARDIOGRAM COMPLETE: CPT | Performed by: INTERNAL MEDICINE

## 2024-03-11 RX ORDER — CARVEDILOL 6.25 MG/1
6.25 TABLET ORAL 2 TIMES DAILY
COMMUNITY
Start: 2023-12-22 | End: 2024-03-11 | Stop reason: SDUPTHER

## 2024-03-11 RX ORDER — METHOCARBAMOL 500 MG/1
500 TABLET, FILM COATED ORAL DAILY PRN
COMMUNITY
Start: 2023-11-15

## 2024-03-11 RX ORDER — CARVEDILOL 6.25 MG/1
6.25 TABLET ORAL 2 TIMES DAILY
Qty: 200 TABLET | Refills: 2 | Status: SHIPPED | OUTPATIENT
Start: 2024-03-11 | End: 2024-03-11 | Stop reason: WASHOUT

## 2024-03-11 NOTE — PATIENT INSTRUCTIONS
6 month follow up  -Labs to be done prior to next appointment   These labs should be fasting.      -Dr. Talbert would like you to watch your diet, exercise and hydrate    -Please bring all medicines, vitamins, and herbal supplements with you in original bottles to every appointment!!!!    -Prescriptions will not be filled unless you are compliant with your follow up appointments or have a follow up appointment scheduled as per instruction of your physician. Refills should be requested at the time of your visit.

## 2024-04-08 ENCOUNTER — APPOINTMENT (OUTPATIENT)
Dept: ORTHOPEDIC SURGERY | Facility: CLINIC | Age: 89
End: 2024-04-08
Payer: MEDICARE

## 2024-04-15 ENCOUNTER — OFFICE VISIT (OUTPATIENT)
Dept: ORTHOPEDIC SURGERY | Facility: CLINIC | Age: 89
End: 2024-04-15
Payer: MEDICARE

## 2024-04-15 DIAGNOSIS — M19.011 PRIMARY OSTEOARTHRITIS OF RIGHT SHOULDER: ICD-10-CM

## 2024-04-15 DIAGNOSIS — M17.11 PRIMARY OSTEOARTHRITIS OF RIGHT KNEE: Primary | ICD-10-CM

## 2024-04-15 PROCEDURE — 20610 DRAIN/INJ JOINT/BURSA W/O US: CPT | Mod: RT | Performed by: ORTHOPAEDIC SURGERY

## 2024-04-15 PROCEDURE — 2500000004 HC RX 250 GENERAL PHARMACY W/ HCPCS (ALT 636 FOR OP/ED): Performed by: ORTHOPAEDIC SURGERY

## 2024-04-15 PROCEDURE — 2500000005 HC RX 250 GENERAL PHARMACY W/O HCPCS: Performed by: ORTHOPAEDIC SURGERY

## 2024-04-15 PROCEDURE — 99213 OFFICE O/P EST LOW 20 MIN: CPT | Mod: 25 | Performed by: ORTHOPAEDIC SURGERY

## 2024-04-15 PROCEDURE — 99214 OFFICE O/P EST MOD 30 MIN: CPT | Performed by: ORTHOPAEDIC SURGERY

## 2024-04-15 PROCEDURE — 1159F MED LIST DOCD IN RCRD: CPT | Performed by: ORTHOPAEDIC SURGERY

## 2024-04-15 NOTE — PROGRESS NOTES
History of Present Illness   No chief complaint on file.      History of Present Illness   Patient presents today he had multiple years of ongoing shoulder and knee pain.  He was treated conservatively in the past with repeat cortisone injections.  He is a somewhat limited in terms of medications.  Is currently on Eliquis.  Is been cautioned against NSAIDs.  He understands only long-term surgical options are replacement however with his complex medical history he is continue with conservative treatment and repeating a cortisone injections  Patient today states that last time he was in injections helped a lot.  The price of the best he is and while they lasted for over 2 months or more.  He is, getting them repeated today.  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  1.  Recommended cortisone injection to the shoulder and knee today  2.  NSAID: Celebrex sent to the pharmacy.  GI side effects and medical risks discussed he will discuss with his treating cardiologist in regards to his anticoagulant and associated risks before taking it  3.  Ice: 30 minutes on and off  4.  Exercise home program: Medically directed knee therapy / Handout given  5.  Follow-up as needed    L Inj/Asp: R knee on 4/16/2024 2:45 PM  Indications: pain  Details: 22 G needle, anterolateral approach  Medications: 8 mL lidocaine 10 mg/mL (1 %); 40 mg triamcinolone acetonide 40 mg/mL  Outcome: tolerated well, no immediate complications  Procedure, treatment alternatives, risks and benefits explained, specific risks discussed. Consent was given by the patient. Immediately prior to  procedure a time out was called to verify the correct patient, procedure, equipment, support staff and site/side marked as required. Patient was prepped and draped in the usual sterile fashion.       L Inj/Asp: R glenohumeral on 4/16/2024 2:45 PM  Indications: pain  Details: 22 G needle, posterior approach  Medications: 8 mL lidocaine 10 mg/mL (1 %); 40 mg triamcinolone acetonide 40 mg/mL  Outcome: tolerated well, no immediate complications  Procedure, treatment alternatives, risks and benefits explained, specific risks discussed. Consent was given by the patient. Immediately prior to procedure a time out was called to verify the correct patient, procedure, equipment, support staff and site/side marked as required. Patient was prepped and draped in the usual sterile fashion.             Physical Exam  Well-nourished, well-developed. No acute distress. Alert and oriented x3. Responds appropriately to questioning. Good mood. Normal affect.  Physical Exam  Right knee:  Skin healthy and intact  No gross swelling or ecchymosis  Trace Effusion:   ROM: 110  Crepitance with range of motion  No pain with internal rotation of the hip  Tenderness to palpation over medial and lateral joint line and with patellar compression      Right shoulder: 5 out of 5 internal rotation strength.,  External rotation to 20.  Moderate pain impingement and forward flexion.     Review of Systems   GENERAL: Negative for malaise, significant weight loss, fever  MUSCULOSKELETAL: See HPI  NEURO:  Negative     No past medical history on file.    Medication Documentation Review Audit       Reviewed by Lucia Lucas MA (Medical Assistant) on 03/11/24 at 1343      Medication Order Taking? Sig Documenting Provider Last Dose Status   acetaminophen (Tylenol 8 HOUR) 650 mg ER tablet [id number] Yes Take 1 tablet (650 mg) by mouth 2 times a day as needed. Do not crush, chew, or split. Historical Provider, MD Taking Active   apixaban (Eliquis) 5 mg tablet  [id number] Yes Take 0.5 tablets (2.5 mg) by mouth 2 times a day. Historical MD Yayo Taking Active   aspirin 81 mg capsule [id number] Yes Take 1 capsule by mouth once daily. Historical MD Yayo Taking Active   atorvastatin (Lipitor) 10 mg tablet [id number] Yes Take 1 tablet (10 mg) by mouth once daily. Becki Zee MD Taking Active   budesonide (Pulmicort) 0.25 mg/2 mL nebulizer solution [id number] Yes Take 2 mL (0.25 mg) by nebulization once daily. Rinse mouth with water after use to reduce aftertaste and incidence of candidiasis. Do not swallow. Historical MD Yayo Taking Active   buPROPion (Wellbutrin) 100 mg tablet [id number] Yes Take 1 tablet (100 mg) by mouth once daily. Becki Zee MD Taking Active   carvedilol (Coreg) 12.5 mg tablet [id number] Yes Take 1 tablet (12.5 mg) by mouth 2 times a day with meals. Dar Tlabert MD Taking Active   carvedilol (Coreg) 6.25 mg tablet [id number] Yes Take 1 tablet (6.25 mg) by mouth 2 times a day. Historical MD Yayo Taking Active   finasteride (Proscar) 5 mg tablet [id number] Yes Take 1 tablet (5 mg) by mouth once daily. Historical MD Yayo Taking Active   fluticasone (Flonase) 50 mcg/actuation nasal spray [id number] Yes Administer 2 sprays into affected nostril(s) once daily. Historical MD Yayo Taking Active   furosemide (Lasix) 20 mg tablet [id number] Yes Take 1 tablet (20 mg) by mouth once daily. Historical MD Yayo Taking Active   isosorbide mononitrate ER (Imdur) 60 mg 24 hr tablet [id number] Yes Take 1 tablet (60 mg) by mouth once daily. Becki Zee MD Taking Active   lisinopril 10 mg tablet [id number] Yes Take 1 tablet (10 mg) by mouth once daily. TAKE 1 TABLET ALONG WITH 5MG TABLET Becki Zee MD Taking Active   lisinopril 5 mg tablet [id number] Yes Take 1 tablet (5 mg) by mouth once daily. TAKE 1 TABLET ALONG WITH 10 MG TABLET Becki Zee MD Taking Active   methocarbamol (Robaxin) 500 mg tablet  [id number] Yes Take 1 tablet (500 mg) by mouth once daily as needed for muscle spasms. Historical Provider, MD Taking Active   montelukast (Singulair) 10 mg tablet [id number] Yes Take 1 tablet (10 mg) by mouth once daily at bedtime. Historical Provider, MD Taking Active   polyethylene glycol (Miralax) 17 gram/dose powder [id number] Yes Take 17 g by mouth once daily. Mix 1 capful (17GM) in 8 ounces of water, juice or tea Historical Provider, MD Taking Active   potassium chloride CR (Klor-Con M20) 20 mEq ER tablet [id number] Yes Take 2 tablets (40 mEq) by mouth once daily. Do not crush or chew. Historical Provider, MD Taking Active                    Allergies   Allergen Reactions    Penicillins Unknown       Social History     Socioeconomic History    Marital status:      Spouse name: Not on file    Number of children: Not on file    Years of education: Not on file    Highest education level: Not on file   Occupational History    Not on file   Tobacco Use    Smoking status: Former     Types: Cigarettes    Smokeless tobacco: Never   Substance and Sexual Activity    Alcohol use: Never    Drug use: Never    Sexual activity: Not on file   Other Topics Concern    Not on file   Social History Narrative    Not on file     Social Determinants of Health     Financial Resource Strain: Low Risk  (3/10/2023)    Received from [facility]    Overall Financial Resource Strain (CARDIA)     Difficulty of Paying Living Expenses: Not hard at all   Food Insecurity: No Food Insecurity (3/10/2023)    Received from [facility]    Hunger Vital Sign     Worried About Running Out of Food in the Last Year: Never true     Ran Out of Food in the Last Year: Never true   Transportation Needs: No Transportation Needs (3/10/2023)    Received from [facility]    PRAPARE - Transportation     Lack of Transportation (Medical): No     Lack of Transportation (Non-Medical): No   Physical Activity: Inactive (3/10/2023)    Received from  [facility]    Exercise Vital Sign     Days of Exercise per Week: 0 days     Minutes of Exercise per Session: 0 min   Stress: Stress Concern Present (3/10/2023)    Received from [facility]    [place] [employer] - Occupational Stress Questionnaire     Feeling of Stress : To some extent   Social Connections: Socially Isolated (3/10/2023)    Received from [facility]    Social Connection and Isolation Panel [NHANES]     Frequency of Communication with Friends and Family: More than three times a week     Frequency of Social Gatherings with Friends and Family: More than three times a week     Attends Hoahaoism Services: Never     Active Member of Clubs or Organizations: No     Attends Club or Organization Meetings: Never     Marital Status:    Intimate Partner Violence: Not on file   Housing Stability: Unknown (3/10/2023)    Received from [facility]    Housing Stability Vital Sign     Unable to Pay for Housing in the Last Year: No     Number of Places Lived in the Last Year: Not on file     Unstable Housing in the Last Year: No       No past surgical history on file.    No results found.                           Scribe Attestation  By signing my name below, Elena DIAZ Scribe   attest that this documentation has been prepared under the direction and in the presence of Denis Meyer MD.

## 2024-04-16 PROCEDURE — 20610 DRAIN/INJ JOINT/BURSA W/O US: CPT | Performed by: ORTHOPAEDIC SURGERY

## 2024-04-16 RX ORDER — TRIAMCINOLONE ACETONIDE 40 MG/ML
40 INJECTION, SUSPENSION INTRA-ARTICULAR; INTRAMUSCULAR
Status: COMPLETED | OUTPATIENT
Start: 2024-04-16 | End: 2024-04-16

## 2024-04-16 RX ORDER — LIDOCAINE HYDROCHLORIDE 10 MG/ML
8 INJECTION INFILTRATION; PERINEURAL
Status: COMPLETED | OUTPATIENT
Start: 2024-04-16 | End: 2024-04-16

## 2024-04-16 RX ADMIN — LIDOCAINE HYDROCHLORIDE 8 ML: 10 INJECTION, SOLUTION INFILTRATION; PERINEURAL at 14:45

## 2024-04-16 RX ADMIN — TRIAMCINOLONE ACETONIDE 40 MG: 40 INJECTION, SUSPENSION INTRA-ARTICULAR; INTRAMUSCULAR at 14:45

## 2024-06-21 DIAGNOSIS — E78.5 HYPERLIPIDEMIA, UNSPECIFIED HYPERLIPIDEMIA TYPE: ICD-10-CM

## 2024-06-24 RX ORDER — ATORVASTATIN CALCIUM 10 MG/1
10 TABLET, FILM COATED ORAL DAILY
Qty: 30 TABLET | Refills: 2 | Status: SHIPPED | OUTPATIENT
Start: 2024-06-24 | End: 2024-09-22

## 2024-06-27 DIAGNOSIS — I50.9 CONGESTIVE HEART FAILURE, UNSPECIFIED HF CHRONICITY, UNSPECIFIED HEART FAILURE TYPE (MULTI): ICD-10-CM

## 2024-06-27 DIAGNOSIS — I10 PRIMARY HYPERTENSION: ICD-10-CM

## 2024-06-27 RX ORDER — FUROSEMIDE 20 MG/1
20 TABLET ORAL DAILY
Qty: 90 TABLET | Refills: 0 | Status: SHIPPED | OUTPATIENT
Start: 2024-06-27

## 2024-07-08 NOTE — PROGRESS NOTES
History of Present Illness:  Right knee and right shoulder cortisone injection on 04/15/24.  He states unfortunately neither the shoulder or knee injection seem to work at all.  He does note an instance where he lost his balance and his son attempted to help steady him on his right arm and thinks that may have irritated the shoulder a bit.    Patient presents today he had multiple years of ongoing shoulder and knee pain.  He was treated conservatively in the past with repeat cortisone injections.  He is a somewhat limited in terms of medications.  Is currently on Eliquis.  Is been cautioned against NSAIDs.  He understands only long-term surgical options are replacement however with his complex medical history he is continue with conservative treatment and repeating a cortisone injections.    He states that he was unable to get relief from his last cortisone injections on 4/15/24. He is open to the idea of receiving another around of cortisone injections for the right knee and right shoulder.       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  1.  Referral to nonoperative provider for an ultrasound guided cortisone injections for the right knee and right shoulder.  2.  NSAID: Celebrex sent to the pharmacy.  GI side effects and medical risks discussed he will discuss with his treating cardiologist in regards to his anticoagulant and associated risks before taking it  3.  Ice: 30 minutes on and off  4.  Exercise home program: Medically directed knee therapy / Handout given  5.  Follow-up as needed     Physical Exam:  Well-nourished, well-developed. No acute distress. Alert and oriented x3. Responds  PHYSICAL THERAPY - DAILY TREATMENT NOTE Patient Name: Gerardo Zamudio        Date: 2019 : 1957   YES Patient  Verified Visit #:   1     Insurance: Payor: TOD / Plan: Holly Richter / Product Type: Creasie Decree / In time: 1:15 Out time: 2:00 Total Treatment Time: 45 Medicare Time Tracking (below) Total Timed Codes (min):  NA 1:1 Treatment Time:  NA  
TREATMENT AREA = Right knee pain [M25.561] Right hip pain [M25.551] SUBJECTIVE Pain Level (on 0 to 10 scale):  5  / 10 Medication Changes/New allergies or changes in medical history, any new surgeries or procedures? NO    If yes, update Summary List  
Subjective Functional Status/Changes:  []  No changes reported CC: right hip pain TONNY/HPI: Pt got injection in right hip in Aug/Sept. Pt stated it helped. Pt now has lateral right hip pain, reporting it is \"bursitis\". Pt reports increased awareness of surroundings due to fear of future injury. Pt is currently having trouble sleeping due to hip pain. Pmhx: R TOÑO (), jiffy hip (), right knee pain (previously seen here 2018 after quad strain d/t fall on 10/6/2018) Symptoms Pain Today: 5/10 Best: 3/10 Worst: 7/10 Aggravating factors: prolonged activity, lifting Relieving factors: ice, Advil/Alleve Occupational tasks: retired;  for [employer] Recreation: walking, swimming Pain with functional activities: 
[x] Yes [] No Squatting 
[x] Yes [] No Lifting 
[x] Yes [] No Walking (10-15 min) [x] Yes [] No Stairs (full flight) OBJECTIVE Palpation: TTP along lateral right hip and distal ITB insertion Gait: normal  
 
ROM / Strength 
[] Unable to assess                  AROM                      PROM                   Strength (1-5) Left Right Left Right Left Right Hip Flexion     4+/5 4+/5 Extension     4-/5 3+/5 Abduction     4-/5 4-/5 Int. rotation 25 20 Sig echevarria Sig echevarria 4+/5 4+/5 Ext. rotation 27 20 WNL WNL 4+/5 4+/5 Knee Flexion     5/5 5/5 Extension     5/5 5/5 Ankle Plantarflexion Dorsiflexion     5/5 5/5 *all other LE AROM within normal limits Flexibility: [] Unable to assess at this time Hamstrings: (L) Tightness= [x] WNL   [] Min   [] Mod   [] Severe (R) Tightness= [x] WNL   [] Min   [] Mod   [] Severe Quadriceps: (L) Tightness= [x] WNL   [] Min   [] Mod   [] Severe (R) Tightness= [] WNL   [x] Min   [] Mod   [] Severe Special tests: 
Hip scour: (-) left, (-) SIJ provocation Therapeutic Procedures: 
Min Procedure Specifics + Rationale  
n/a [x]  Patient Education (performed throughout session) [x] Review HEP [] Progressed/Changed HEP based on:  
[] proper performance and advancement of Therex/TA [] reduction in pain level   
[] increased functional capacity [] change in directional preference 10 [x] Therapeutic Exercise [x]  See Flowsheet Rationale: increase ROM and increase strength to improve the patients ability to participate in ADL's Other Objective/Functional Measures: 
 
See objective measurements above. HEP initiated to include bridges, sidelying clams, and seated hip IR. Post Treatment Pain Level (on 0 to 10) scale:   5  / 10 ASSESSMENT Assessment/Changes in Function: See POC Justification for Eval Code Complexity: moderate Patient History (low 0, mod 1-2, high 3-4): high; right TOÑO (2014), right jiffy hip (2014), right quad strain Examination (low 1-2, mod 3+, high 4+): moderate; decreased hip AROM, decreased LE strength Clinical Presentation (low: stable/uncomplicated; mod: evolving; high: unstable/unpredictable): moderate Clinical Decision Making (low , mod 26-74, high 1-25): moderate; FOTO; 46 
 
[x]  See Plan of Care 
[]  See Progress Note/ Recertification 
[]  See Discharge Summary Patient will continue to benefit from skilled PT services to modify and appropriately to questioning. Good mood. Normal affect.  Physical Exam  Right knee:  Skin healthy and intact  No gross swelling or ecchymosis  Trace Effusion:   ROM: 110  Crepitance with range of motion  No pain with internal rotation of the hip  Tenderness to palpation over medial and lateral joint line and with patellar compression      Right shoulder: 5 out of 5 internal rotation strength.,  External rotation to 20.  Moderate pain impingement and forward flexion.     Review of Systems:  GENERAL: Negative for malaise, significant weight loss, fever  MUSCULOSKELETAL: See HPI  NEURO:  Negative     No past medical history on file.    Medication Documentation Review Audit       Reviewed by Paloma Alvarado MA (Medical Assistant) on 07/15/24 at 1313      Medication Order Taking? Sig Documenting Provider Last Dose Status   acetaminophen (Tylenol 8 HOUR) 650 mg ER tablet [id number] No Take 1 tablet (650 mg) by mouth 2 times a day as needed. Do not crush, chew, or split. Becki Zee MD Taking Active   apixaban (Eliquis) 5 mg tablet [id number] No Take 0.5 tablets (2.5 mg) by mouth 2 times a day. Becki Zee MD Taking Active   aspirin 81 mg capsule [id number] No Take 1 capsule by mouth once daily. Becki Zee MD Taking Active   atorvastatin (Lipitor) 10 mg tablet [id number]  Take 1 tablet (10 mg) by mouth once daily. Dar Talbert MD  Active   budesonide (Pulmicort) 0.25 mg/2 mL nebulizer solution [id number] No Take 2 mL (0.25 mg) by nebulization once daily. Rinse mouth with water after use to reduce aftertaste and incidence of candidiasis. Do not swallow. Becki Zee MD Taking Active   buPROPion (Wellbutrin) 100 mg tablet [id number] No Take 1 tablet (100 mg) by mouth once daily. Becki Zee MD Taking Active   carvedilol (Coreg) 12.5 mg tablet [id number] No Take 1 tablet (12.5 mg) by mouth 2 times a day with meals. Dar Talbert MD Taking Active   finasteride (Proscar) 5 mg  progress therapeutic interventions, address functional mobility deficits, address ROM deficits, address strength deficits, analyze and address soft tissue restrictions, analyze and cue movement patterns, analyze and modify body mechanics/ergonomics, assess and modify postural abnormalities, address imbalance/dizziness and instruct in home and community integration  to attain remaining goals Progress toward goals / Updated goals: 
See POC PLAN [x]  Upgrade activities as tolerated 
[x]  Update interventions per flow sheet YES Continue plan of care  
[]  Discharge due to :   
[]  Other:   
 
Therapist: BUD Melendez DPT Date: 2/27/2019 Time: 8:28 AM  
 
Future Appointments Date Time Provider Ariel Gonzalez 2/27/2019  1:00 PM Arianna Avila [facility] AT [facility]  
 
 
 tablet [id number] No Take 1 tablet (5 mg) by mouth once daily. Historical MD Yayo Taking Active   fluticasone (Flonase) 50 mcg/actuation nasal spray [id number] No Administer 2 sprays into affected nostril(s) once daily. Historical MD Yayo Taking Active   furosemide (Lasix) 20 mg tablet [id number]  Take 1 tablet (20 mg) by mouth once daily. Dar Talbert MD  Active   isosorbide mononitrate ER (Imdur) 60 mg 24 hr tablet [id number] No Take 1 tablet (60 mg) by mouth once daily. Historical MD Yayo Taking Active   lisinopril 10 mg tablet [id number] No Take 1 tablet (10 mg) by mouth once daily. TAKE 1 TABLET ALONG WITH 5MG TABLET Becki Zee MD Taking Active   lisinopril 5 mg tablet [id number] No Take 1 tablet (5 mg) by mouth once daily. TAKE 1 TABLET ALONG WITH 10 MG TABLET Becki Zee MD Taking Active   methocarbamol (Robaxin) 500 mg tablet [id number] No Take 1 tablet (500 mg) by mouth once daily as needed for muscle spasms. Becki Zee MD Taking Active   montelukast (Singulair) 10 mg tablet [id number] No Take 1 tablet (10 mg) by mouth once daily at bedtime. Becki Zee MD Taking Active   polyethylene glycol (Miralax) 17 gram/dose powder [id number] No Take 17 g by mouth once daily. Mix 1 capful (17GM) in 8 ounces of water, juice or tea Historical MD Yayo Taking Active   potassium chloride CR (Klor-Con M20) 20 mEq ER tablet [id number] No Take 2 tablets (40 mEq) by mouth once daily. Do not crush or chew. Historical MD Yayo Taking Active                    Allergies   Allergen Reactions    Penicillins Unknown       Social History     Socioeconomic History    Marital status:      Spouse name: Not on file    Number of children: Not on file    Years of education: Not on file    Highest education level: Not on file   Occupational History    Not on file   Tobacco Use    Smoking status: Former     Types: Cigarettes    Smokeless tobacco: Never   Substance and Sexual  Activity    Alcohol use: Never    Drug use: Never    Sexual activity: Not on file   Other Topics Concern    Not on file   Social History Narrative    Not on file     Social Determinants of Health     Financial Resource Strain: Low Risk  (5/23/2024)    Received from [facility]    Overall Financial Resource Strain (CARDIA)     Difficulty of Paying Living Expenses: Not hard at all   Food Insecurity: No Food Insecurity (5/23/2024)    Received from [facility]    Hunger Vital Sign     Worried About Running Out of Food in the Last Year: Never true     Ran Out of Food in the Last Year: Never true   Transportation Needs: No Transportation Needs (5/23/2024)    Received from [facility]    PRAPARE - Transportation     Lack of Transportation (Medical): No     Lack of Transportation (Non-Medical): No   Physical Activity: Inactive (5/23/2024)    Received from [facility]    Exercise Vital Sign     Days of Exercise per Week: 0 days     Minutes of Exercise per Session: 0 min   Stress: No Stress Concern Present (5/23/2024)    Received from [facility]    [place] [employer] - Occupational Stress Questionnaire     Feeling of Stress : Only a little   Social Connections: Unknown (5/23/2024)    Received from [facility]    Social Connection and Isolation Panel [NHANES]     Frequency of Communication with Friends and Family: More than three times a week     Frequency of Social Gatherings with Friends and Family: Patient declined     Attends [employer] Services: Patient declined     Active Member of Clubs or Organizations: No     Attends Club or Organization Meetings: Never     Marital Status:    Intimate Partner Violence: Not on file   Housing Stability: Unknown (5/23/2024)    Received from [facility]    Housing Stability Vital Sign     Unable to Pay  for Housing in the Last Year: No     Number of Places Lived in the Last Year: Not on file     In the last 12 months, was there a time when you did not have a steady place to sleep or slept in a shelter (including now)?: No       No past surgical history on file.    No results found.        Scribe Attestation  By signing my name below, I, Lucho Haro   attest that this documentation has been prepared under the direction and in the presence of Denis Meyer MD.

## 2024-07-15 ENCOUNTER — OFFICE VISIT (OUTPATIENT)
Dept: ORTHOPEDIC SURGERY | Facility: CLINIC | Age: 89
End: 2024-07-15
Payer: MEDICARE

## 2024-07-15 DIAGNOSIS — M19.011 PRIMARY OSTEOARTHRITIS OF RIGHT SHOULDER: ICD-10-CM

## 2024-07-15 DIAGNOSIS — M17.11 PRIMARY OSTEOARTHRITIS OF RIGHT KNEE: ICD-10-CM

## 2024-07-15 PROCEDURE — 99213 OFFICE O/P EST LOW 20 MIN: CPT | Performed by: ORTHOPAEDIC SURGERY

## 2024-07-15 PROCEDURE — 1159F MED LIST DOCD IN RCRD: CPT | Performed by: ORTHOPAEDIC SURGERY

## 2024-07-15 PROCEDURE — 99214 OFFICE O/P EST MOD 30 MIN: CPT | Performed by: ORTHOPAEDIC SURGERY

## 2024-07-15 RX ORDER — CELECOXIB 200 MG/1
200 CAPSULE ORAL DAILY
Qty: 30 CAPSULE | Refills: 0 | Status: SHIPPED | OUTPATIENT
Start: 2024-07-15 | End: 2024-08-14

## 2024-08-05 ENCOUNTER — HOSPITAL ENCOUNTER (OUTPATIENT)
Dept: RADIOLOGY | Facility: EXTERNAL LOCATION | Age: 89
Discharge: HOME | End: 2024-08-05

## 2024-08-05 ENCOUNTER — APPOINTMENT (OUTPATIENT)
Dept: ORTHOPEDIC SURGERY | Facility: CLINIC | Age: 89
End: 2024-08-05
Payer: MEDICARE

## 2024-08-05 DIAGNOSIS — M17.11 PRIMARY OSTEOARTHRITIS OF RIGHT KNEE: ICD-10-CM

## 2024-08-05 DIAGNOSIS — M19.011 PRIMARY OSTEOARTHRITIS OF RIGHT SHOULDER: ICD-10-CM

## 2024-08-05 PROCEDURE — 1159F MED LIST DOCD IN RCRD: CPT | Performed by: FAMILY MEDICINE

## 2024-08-05 PROCEDURE — 1036F TOBACCO NON-USER: CPT | Performed by: FAMILY MEDICINE

## 2024-08-05 PROCEDURE — 99214 OFFICE O/P EST MOD 30 MIN: CPT | Performed by: FAMILY MEDICINE

## 2024-08-05 PROCEDURE — 20611 DRAIN/INJ JOINT/BURSA W/US: CPT | Performed by: FAMILY MEDICINE

## 2024-08-05 PROCEDURE — 1160F RVW MEDS BY RX/DR IN RCRD: CPT | Performed by: FAMILY MEDICINE

## 2024-08-05 RX ORDER — LIDOCAINE HYDROCHLORIDE 10 MG/ML
6 INJECTION INFILTRATION; PERINEURAL
Status: COMPLETED | OUTPATIENT
Start: 2024-08-05 | End: 2024-08-05

## 2024-08-05 RX ORDER — TRIAMCINOLONE ACETONIDE 40 MG/ML
40 INJECTION, SUSPENSION INTRA-ARTICULAR; INTRAMUSCULAR
Status: COMPLETED | OUTPATIENT
Start: 2024-08-05 | End: 2024-08-05

## 2024-08-05 NOTE — PROGRESS NOTES
Established Patient Follow-Up Visit    CC:   Chief Complaint   Patient presents with    Right Knee - Injections     Dr. Meyer patient  End-stage tricompartment arthritis  Here for [facility] cortisone injection today    Right Shoulder - Injections     Dr. Meyer patient  End-stage glenohumeral arthritis  Here for [facility] cortisone injection today       HPI:  Abhishek is a 89 y.o. male returns here today for follow-up visit regarding: Chronic shoulder arthropathy and right knee pain.  He is here for injections at the present Dr. Denis Meyer          REVIEW OF SYSTEMS:  GENERAL: Negative for malaise, significant weight loss, fever  MUSCULOSKELETAL: See HPI  NEURO: Negative for numbness / tingling       PHYSICAL EXAM:  -Neuro: The bilateral upper and lower extremities were examined and demonstrated intact sensation medially and laterally.  -Extremity: Right shoulder demonstrates post procedure improve range of motion with forward flexion to 120 degrees lateral abduction to 90 crepitus noted no redness or erythema over the injection site.  Right knee demonstrates flexion past 90 extension to 0 with small joint effusion.    IMAGING: No new images today  Point of Care Ultrasound  These images are not reportable by radiology and will not be interpreted   by  Radiologists.      PROCEDURE: US Guided right intra-articular Shoulder Injection:    Before injection the risks  of this procedure including but not limited to;  infection, local skin irritation, skin atrophy, calcification, continued pain or discomfort, elevated blood sugar, burning, failure to relieve pain, possible late infection were all discussed with the patient.  The patient verbalized understanding and consented to the procedure.     After informed consent was provided, patient identification was confirmed, and allergies were verified, the patient was appropriately positioned. The patient´s [right] shoulder was evaluated via ultrasound to identify the posterior  glenohumeral joint space.    The site was marked and time-out performed.  The injection site was prepped in the usual sterile manner to provide a sterile environment. The skin was anesthetized with ethyl chloride spray. The aspiration/injection was performed with standard technique with a 22G spinal needle passed through the skin into the joint space under direct ultrasound guidance via sterile precautions. Next, [7] cc´s of injectate consisting of [1] cc´s of [Kenalog] and [6] cc´s of 1% lidocaine without epi was instilled into the joint space.      The needle was withdrawn and the puncture site was secured with a Band-Aid. The patient tolerated the procedure well without complication.     Post-procedure discomfort can be alleviated with additional medication, ice, elevation, and rest over the first 24 hours as recommended.    US Guided right knee Injection:    Before injection the risks of this procedure including but not limited to;  infection, local skin irritation, skin atrophy, calcification, continued pain or discomfort, elevated blood sugar, burning, failure to relieve pain, possible late infection were all discussed with the patient.  The patient verbalized understanding and consented to the procedure.     After informed consent was provided, patient identification was confirmed, and allergies were verified, the patient was appropriately positioned. The patient´s right knee was evaluated in both the short and long axis via ultrasound to identify the intraarticular joint space. An effusion [was] present.  The site was marked and time-out performed.      The injection site was prepped in the usual sterile manner to provide a sterile environment. The skin was anesthetized with ethyl chloride spray. The injection was performed with standard technique. A 22G needle was passed through the skin into the joint space via the superolateral approach with direct ultrasound guidance.  [7] cc of injectate consisting of  [1] cc´s [Kenalog] and [6] cc´s 1% lidocaine without epi was instilled into the joint space.      The needle was withdrawn and the puncture site was secured with a Band-Aid. The patient tolerated the procedure well without complication.     Post-procedure discomfort can be alleviated with additional medication, ice, elevation, and rest over the first 24 hours as recommended.      L Inj/Asp: R knee on 8/5/2024 2:33 PM  Indications: pain and joint swelling  Details: 22 G needle, ultrasound-guided superolateral approach  Medications: 6 mL lidocaine 10 mg/mL (1 %); 40 mg triamcinolone acetonide 40 mg/mL  Outcome: tolerated well, no immediate complications  Procedure, treatment alternatives, risks and benefits explained, specific risks discussed. Consent was given by the patient. Immediately prior to procedure a time out was called to verify the correct patient, procedure, equipment, support staff and site/side marked as required. Patient was prepped and draped in the usual sterile fashion.       L Inj/Asp: R glenohumeral on 8/5/2024 2:33 PM  Indications: pain  Details: 22 G needle, ultrasound-guided posterior approach  Medications: 6 mL lidocaine 10 mg/mL (1 %); 40 mg triamcinolone acetonide 40 mg/mL  Outcome: tolerated well, no immediate complications  Procedure, treatment alternatives, risks and benefits explained, specific risks discussed. Consent was given by the patient. Immediately prior to procedure a time out was called to verify the correct patient, procedure, equipment, support staff and site/side marked as required. Patient was prepped and draped in the usual sterile fashion.            ASSESSMENT:   Follow-up visit for:  Problem List Items Addressed This Visit       Knee osteoarthritis    Relevant Orders    Point of Care Ultrasound (Completed)    Osteoarthritis, shoulder    Relevant Orders    Point of Care Ultrasound (Completed)        PLAN: Patient received and tolerated the injection well had some modest  immediate symptomatic relief to the right shoulder.  Will plan on having the patient follow-up in 3-month intervals for repeat injections.  We also discussed possibility of performing interval gel injections to the knees as indicated.  No need for x-rays at follow-up.  Consider repeat steroid shot to the right shoulder and right knee versus possible gel shots.  Orders Placed This Encounter    L Inj/Asp    L Inj/Asp    Point of Care Ultrasound           At the conclusion of the visit there were no further questions by the patient/family regarding their plan of care.  Patient was instructed to call or return with any issues, questions, or concerns regarding their injury and/or treatment plan described above.     08/05/24 at 2:35 PM - Cole C Budinsky, MD    Office: [phone]    This note was prepared using voice recognition software.  The details of this note are correct and have been reviewed, and corrected to the best of my ability.  Some grammatical errors may persist related to the Dragon software.

## 2024-08-14 DIAGNOSIS — I25.10 ATHEROSCLEROSIS OF NATIVE CORONARY ARTERY OF NATIVE HEART WITHOUT ANGINA PECTORIS: ICD-10-CM

## 2024-08-14 RX ORDER — ISOSORBIDE MONONITRATE 60 MG/1
60 TABLET, EXTENDED RELEASE ORAL DAILY
Qty: 30 TABLET | Refills: 0 | Status: SHIPPED | OUTPATIENT
Start: 2024-08-14 | End: 2024-09-13

## 2024-09-06 ENCOUNTER — LAB (OUTPATIENT)
Dept: LAB | Facility: LAB | Age: 89
End: 2024-09-06
Payer: MEDICARE

## 2024-09-06 DIAGNOSIS — I10 HYPERTENSION, UNSPECIFIED TYPE: ICD-10-CM
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
## 2024-09-06 DIAGNOSIS — I10 PRIMARY HYPERTENSION: ICD-10-CM

## 2024-09-06 DIAGNOSIS — Z87.891 FORMER SMOKER: ICD-10-CM

## 2024-09-06 DIAGNOSIS — E66.3 OVERWEIGHT WITH BODY MASS INDEX (BMI) OF 28 TO 28.9 IN ADULT: ICD-10-CM

## 2024-09-06 DIAGNOSIS — R06.00 DYSPNEA, UNSPECIFIED TYPE: ICD-10-CM

## 2024-09-06 DIAGNOSIS — I26.93 SINGLE SUBSEGMENTAL PULMONARY EMBOLISM WITHOUT ACUTE COR PULMONALE (MULTI): ICD-10-CM

## 2024-09-06 DIAGNOSIS — Z79.01 LONG TERM (CURRENT) USE OF ANTICOAGULANTS: ICD-10-CM

## 2024-09-06 DIAGNOSIS — I50.9 CONGESTIVE HEART FAILURE, UNSPECIFIED HF CHRONICITY, UNSPECIFIED HEART FAILURE TYPE (MULTI): ICD-10-CM

## 2024-09-06 DIAGNOSIS — I25.10 ATHEROSCLEROSIS OF NATIVE CORONARY ARTERY OF NATIVE HEART WITHOUT ANGINA PECTORIS: ICD-10-CM

## 2024-09-06 LAB
ALBUMIN SERPL BCP-MCNC: 3.8 G/DL (ref 3.4–5)
ALP SERPL-CCNC: 84 U/L (ref 33–136)
ALT SERPL W P-5'-P-CCNC: 15 U/L (ref 10–52)
ANION GAP SERPL CALC-SCNC: 12 MMOL/L (ref 10–20)
AST SERPL W P-5'-P-CCNC: 15 U/L (ref 9–39)
BILIRUB DIRECT SERPL-MCNC: 0.1 MG/DL (ref 0–0.3)
BILIRUB SERPL-MCNC: 0.5 MG/DL (ref 0–1.2)
BUN SERPL-MCNC: 15 MG/DL (ref 6–23)
CALCIUM SERPL-MCNC: 8.9 MG/DL (ref 8.6–10.3)
CHLORIDE SERPL-SCNC: 106 MMOL/L (ref 98–107)
CHOLEST SERPL-MCNC: 136 MG/DL (ref 0–199)
CHOLESTEROL/HDL RATIO: 3.9
CO2 SERPL-SCNC: 23 MMOL/L (ref 21–32)
CREAT SERPL-MCNC: 0.88 MG/DL (ref 0.5–1.3)
EGFRCR SERPLBLD CKD-EPI 2021: 82 ML/MIN/1.73M*2
ERYTHROCYTE [DISTWIDTH] IN BLOOD BY AUTOMATED COUNT: 12.6 % (ref 11.5–14.5)
GLUCOSE SERPL-MCNC: 91 MG/DL (ref 74–99)
HCT VFR BLD AUTO: 40.2 % (ref 41–52)
HDLC SERPL-MCNC: 35.3 MG/DL
HGB BLD-MCNC: 13.5 G/DL (ref 13.5–17.5)
LDLC SERPL CALC-MCNC: 64 MG/DL
MAGNESIUM SERPL-MCNC: 1.98 MG/DL (ref 1.6–2.4)
MCH RBC QN AUTO: 33.4 PG (ref 26–34)
MCHC RBC AUTO-ENTMCNC: 33.6 G/DL (ref 32–36)
MCV RBC AUTO: 100 FL (ref 80–100)
NON HDL CHOLESTEROL: 101 MG/DL (ref 0–149)
NRBC BLD-RTO: 0 /100 WBCS (ref 0–0)
PLATELET # BLD AUTO: 200 X10*3/UL (ref 150–450)
POTASSIUM SERPL-SCNC: 4.5 MMOL/L (ref 3.5–5.3)
PROT SERPL-MCNC: 5.6 G/DL (ref 6.4–8.2)
RBC # BLD AUTO: 4.04 X10*6/UL (ref 4.5–5.9)
SODIUM SERPL-SCNC: 136 MMOL/L (ref 136–145)
TRIGL SERPL-MCNC: 184 MG/DL (ref 0–149)
TSH SERPL-ACNC: 1.29 MIU/L (ref 0.44–3.98)
VLDL: 37 MG/DL (ref 0–40)
WBC # BLD AUTO: 9.3 X10*3/UL (ref 4.4–11.3)

## 2024-09-06 PROCEDURE — 83735 ASSAY OF MAGNESIUM: CPT

## 2024-09-06 PROCEDURE — 36415 COLL VENOUS BLD VENIPUNCTURE: CPT

## 2024-09-06 PROCEDURE — 80053 COMPREHEN METABOLIC PANEL: CPT

## 2024-09-06 PROCEDURE — 80061 LIPID PANEL: CPT

## 2024-09-06 PROCEDURE — 82248 BILIRUBIN DIRECT: CPT

## 2024-09-06 PROCEDURE — 84443 ASSAY THYROID STIM HORMONE: CPT

## 2024-09-06 PROCEDURE — 85027 COMPLETE CBC AUTOMATED: CPT

## 2024-09-11 ENCOUNTER — APPOINTMENT (OUTPATIENT)
Dept: CARDIOLOGY | Facility: CLINIC | Age: 89
End: 2024-09-11
Payer: MEDICARE

## 2024-09-11 VITALS
HEIGHT: 72 IN | SYSTOLIC BLOOD PRESSURE: 130 MMHG | DIASTOLIC BLOOD PRESSURE: 66 MMHG | WEIGHT: 234.8 LBS | BODY MASS INDEX: 31.8 KG/M2 | HEART RATE: 67 BPM

## 2024-09-11 DIAGNOSIS — E66.3 OVERWEIGHT WITH BODY MASS INDEX (BMI) OF 28 TO 28.9 IN ADULT: ICD-10-CM

## 2024-09-11 DIAGNOSIS — E78.5 HYPERLIPIDEMIA, UNSPECIFIED HYPERLIPIDEMIA TYPE: ICD-10-CM

## 2024-09-11 DIAGNOSIS — Z79.01 LONG TERM (CURRENT) USE OF ANTICOAGULANTS: ICD-10-CM

## 2024-09-11 DIAGNOSIS — I25.10 ATHEROSCLEROSIS OF NATIVE CORONARY ARTERY OF NATIVE HEART WITHOUT ANGINA PECTORIS: ICD-10-CM

## 2024-09-11 DIAGNOSIS — I26.93 SINGLE SUBSEGMENTAL PULMONARY EMBOLISM WITHOUT ACUTE COR PULMONALE (MULTI): ICD-10-CM
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
## 2024-09-11 DIAGNOSIS — Z87.891 FORMER SMOKER: ICD-10-CM

## 2024-09-11 DIAGNOSIS — R06.00 DYSPNEA, UNSPECIFIED TYPE: ICD-10-CM

## 2024-09-11 DIAGNOSIS — I10 HYPERTENSION, UNSPECIFIED TYPE: ICD-10-CM

## 2024-09-11 DIAGNOSIS — I50.9 CONGESTIVE HEART FAILURE, UNSPECIFIED HF CHRONICITY, UNSPECIFIED HEART FAILURE TYPE (MULTI): ICD-10-CM

## 2024-09-11 DIAGNOSIS — I10 PRIMARY HYPERTENSION: ICD-10-CM

## 2024-09-11 PROCEDURE — 93000 ELECTROCARDIOGRAM COMPLETE: CPT | Performed by: INTERNAL MEDICINE

## 2024-09-11 PROCEDURE — 99214 OFFICE O/P EST MOD 30 MIN: CPT | Performed by: INTERNAL MEDICINE

## 2024-09-11 PROCEDURE — 3078F DIAST BP <80 MM HG: CPT | Performed by: INTERNAL MEDICINE

## 2024-09-11 PROCEDURE — 1036F TOBACCO NON-USER: CPT | Performed by: INTERNAL MEDICINE

## 2024-09-11 PROCEDURE — 1159F MED LIST DOCD IN RCRD: CPT | Performed by: INTERNAL MEDICINE

## 2024-09-11 PROCEDURE — 3075F SYST BP GE 130 - 139MM HG: CPT | Performed by: INTERNAL MEDICINE

## 2024-09-11 RX ORDER — LISINOPRIL 10 MG/1
10 TABLET ORAL DAILY
Qty: 90 TABLET | Refills: 3 | Status: SHIPPED | OUTPATIENT
Start: 2024-09-11

## 2024-09-11 RX ORDER — ATORVASTATIN CALCIUM 10 MG/1
TABLET, FILM COATED ORAL
Qty: 90 TABLET | Refills: 3 | Status: SHIPPED | OUTPATIENT
Start: 2024-09-11

## 2024-09-11 RX ORDER — FUROSEMIDE 20 MG/1
20 TABLET ORAL DAILY
Qty: 90 TABLET | Refills: 3 | Status: SHIPPED | OUTPATIENT
Start: 2024-09-11

## 2024-09-11 RX ORDER — LISINOPRIL 5 MG/1
5 TABLET ORAL DAILY
Qty: 90 TABLET | Refills: 3 | Status: SHIPPED | OUTPATIENT
Start: 2024-09-11

## 2024-09-11 RX ORDER — CARVEDILOL 12.5 MG/1
12.5 TABLET ORAL
Qty: 180 TABLET | Refills: 3 | Status: SHIPPED | OUTPATIENT
Start: 2024-09-11 | End: 2025-09-11

## 2024-09-11 RX ORDER — ISOSORBIDE MONONITRATE 60 MG/1
TABLET, EXTENDED RELEASE ORAL
Qty: 90 TABLET | Refills: 3 | Status: SHIPPED | OUTPATIENT
Start: 2024-09-11

## 2024-09-11 NOTE — PROGRESS NOTES
CARDIOLOGY OFFICE NOTE     Date:   9/11/2024    Patient:    Abhishek Abel    YOB: 1934    Primary Physician: Ha Ahuja MD       Reason for Visit: 6-month cardiology follow-up.    HPI:     Abhishek Abel was seen in cardiac evaluation at the  Cardiology office September 11, 2024.      The patients problems are listed as in the impression below.    Electronic medical records reviewed.    Patient returns.  He is with his wife.  States that he is feeling well overall.  From a cardiovascular standpoint he is asymptomatic.  He has no new complaints.    Patient denies Chest Pain, SOB, Lightheadedness, Dizziness, TIA or CVA symptoms.  No CHF or Edema.  No Palpitations.  No GI,  or Bleeding Issues. No Recent Fever or Chills.     Cardiovascular and general review of systems is otherwise negative.    A 14-system review is otherwise negative, other than noted.     PHYSICAL EXAMINATION:      Vitals:    09/11/24 1205   BP: 130/66   Pulse: 67     General: No acute distress.  Alert and oriented.  Head And Neck Examination: No jugular venous distention, no carotid bruits, no mass. Carotid upstrokes preserved. Oral mucosa moist. No xanthelasma. Head and neck examination otherwise unremarkable.  Lungs: Clear to auscultation and percussion. No wheezes, no rales, and no rhonchi.  Chest: Excursion appeared to be normal. No chest wall tenderness on palpation.  Heart: Normal S1 and S2. No S3. No S4. No rub. Grade 1/6 systolic murmur, best heard at the left sternal border. Point of maximal impulse was within normal limits.  Abdomen: Soft. Nontender. No organomegaly. No bruits. No masses. Obese.  Extremities: No bipedal edema. No clubbing. No cyanosis. Pulses are strong throughout. No bruits.  Right arm rotator cuff injury with decreased range of motion.  Musculoskeletal Exam: No ulcers, otherwise unremarkable.  Neuro: Neurologically appeared grossly intact.  .  IMPRESSION:       Cardiovascular status  stable  Asymptomatic  Shortness of breath, resolved  Falls, mechanical  Pulmonary embolism right 5/2023  Long-term Eliquis  Coronary artery disease with negative Myoview perfusion stress test 5/2023  Normal left ventricular function, LVEF 72%  Hypertension  Hyperlipidemia  Reported congestive heart failure  Obesity  Degenerative joint disease  Right rotator cuff injury.  Past smoker  Otherwise as per assessment below.    RECOMMENDATIONS:      Patient overall is doing well.  He has no plans for any surgeries.  He is relatively active from a cardiovascular standpoint without limitations.    Would suggest that he continue his current medications.  Refills were provided.    Exercise dietary program.  Hydration.    [employer] use was encouraged.    We will plan to see back in 6 months with Laboratory Studies and ECG as ordered.     Patient will follow up with their primary physician for general care.    The patient knows to contact medical care earlier if need be.      ALLERGIES:     Allergies   Allergen Reactions    Penicillins Unknown        MEDICATIONS:     Current Outpatient Medications   Medication Instructions    acetaminophen (TYLENOL 8 HOUR) 650 mg, oral, 2 times daily PRN, Do not crush, chew, or split.    apixaban (ELIQUIS) 2.5 mg, oral, 2 times daily    aspirin 81 mg capsule 1 capsule, oral, Daily    atorvastatin (LIPITOR) 10 mg, oral, Daily    budesonide (PULMICORT) 0.25 mg, nebulization, Daily RT, Rinse mouth with water after use to reduce aftertaste and incidence of candidiasis. Do not swallow.    buPROPion (WELLBUTRIN) 100 mg, oral, Daily    carvedilol (COREG) 12.5 mg, oral, 2 times daily (morning and late afternoon)    finasteride (PROSCAR) 5 mg, oral, Daily    fluticasone (Flonase) 50 mcg/actuation nasal spray 2 sprays, nasal, Daily    furosemide (LASIX) 20 mg, oral, Daily    isosorbide mononitrate ER (IMDUR) 60 mg, oral, Daily    lisinopril 5 mg, oral, Daily, TAKE 1 TABLET ALONG WITH 10 MG TABLET     lisinopril 10 mg, oral, Daily, TAKE 1 TABLET ALONG WITH 5MG TABLET    methocarbamol (ROBAXIN) 500 mg, oral, Daily PRN    montelukast (SINGULAIR) 10 mg, oral, Nightly    polyethylene glycol (MIRALAX) 17 g, oral, Daily, Mix 1 capful (17GM) in 8 ounces of water, juice or tea    potassium chloride CR (Klor-Con M20) 20 mEq ER tablet 2 tablets, oral, Daily, Do not crush or chew.       ELECTROCARDIOGRAM:      Sinus rhythm, poor wave anterior progression, inferior Q's, no acute changes.  Rate 67.    CARDIAC TESTING:      None this visit    LABORATORY DATA:      CBC:   Lab Results   Component Value Date    WBC 9.3 09/06/2024    RBC 4.04 (L) 09/06/2024    HGB 13.5 09/06/2024    HCT 40.2 (L) 09/06/2024     09/06/2024        CMP:    Lab Results   Component Value Date     09/06/2024    K 4.5 09/06/2024     09/06/2024    CO2 23 09/06/2024    BUN 15 09/06/2024    CREATININE 0.88 09/06/2024    GLUCOSE 91 09/06/2024    CALCIUM 8.9 09/06/2024       Magnesium:    Lab Results   Component Value Date    MG 1.98 09/06/2024       Lipid Profile:    Lab Results   Component Value Date    CHOL 136 09/06/2024    TRIG 184 (H) 09/06/2024    HDL 35.3 09/06/2024       Hepatic Function Panel:    Lab Results   Component Value Date    ALKPHOS 84 09/06/2024    ALT 15 09/06/2024    AST 15 09/06/2024    PROT 5.6 (L) 09/06/2024    BILITOT 0.5 09/06/2024    BILIDIR 0.1 09/06/2024       TSH:    Lab Results   Component Value Date    TSH 1.29 09/06/2024               PROBLEM LIST:     Patient Active Problem List   Diagnosis    Bilateral sensorineural hearing loss    Impacted cerumen of right ear    Knee osteoarthritis    Osteoarthritis, shoulder    CAD (coronary atherosclerotic disease)    CHF (congestive heart failure) (Multi)    Dyspnea    Falls    Former smoker    HTN (hypertension)    Long term (current) use of anticoagulants    Overweight with body mass index (BMI) of 28 to 28.9 in adult    Pulmonary embolism (Multi)             Dar  JEAN-PIERRE Talbert MD, [facility] /  Cardiology      Of Note:  [employer] voice recognition dictation software was utilized partially in the preparation of this note, therefore, inaccuracies in spelling, word choice and punctuation may have occurred which were not recognized the time of signing.    Patient was seen and examined with total time of visit including chart preparation, rooming, and chart completion exceeding 40 minutes.      ----

## 2024-09-11 NOTE — PATIENT INSTRUCTIONS
DID YOU KNOW  We have a pharmacy here in the [facility].  They can fill all prescriptions, not just cardiac medications.  Prescriptions from other pharmacies can easily be transferred to the  pharmacy by the  pharmacist on site.   pharmacies offer FREE HOME DELIVERY on medications to anywhere in [place]. They can sync your medications. Typically prescriptions can be ready in 10 - 15 minutes. If pharmacy is unable to fill your  prescription or if cost is more than your paying now the Pharmacist can easily transfer back to your Pharmacy of choice. Pharmacy phone # [phone].      Please bring all medicines, vitamins, and herbal supplements with you in original bottles to every appointment! This is the best way to ensure your medication list in your chart is accurate.    Prescriptions will not be filled unless you are compliant with your follow up appointments or have a follow up appointment scheduled as per instruction of your physician. Refills should be requested at the time of your visit.

## 2024-11-11 ENCOUNTER — HOSPITAL ENCOUNTER (OUTPATIENT)
Dept: RADIOLOGY | Facility: EXTERNAL LOCATION | Age: 89
Discharge: HOME | End: 2024-11-11

## 2024-11-11 ENCOUNTER — APPOINTMENT (OUTPATIENT)
Dept: ORTHOPEDIC SURGERY | Facility: CLINIC | Age: 89
End: 2024-11-11
Payer: MEDICARE

## 2024-11-11 DIAGNOSIS — M19.042 ARTHRITIS OF FINGER OF BOTH HANDS: Primary | ICD-10-CM

## 2024-11-11 DIAGNOSIS — M17.11 PRIMARY OSTEOARTHRITIS OF RIGHT KNEE: ICD-10-CM

## 2024-11-11 DIAGNOSIS — M19.011 PRIMARY OSTEOARTHRITIS OF RIGHT SHOULDER: ICD-10-CM

## 2024-11-11 DIAGNOSIS — M19.041 ARTHRITIS OF FINGER OF BOTH HANDS: Primary | ICD-10-CM

## 2024-11-11 PROCEDURE — 99214 OFFICE O/P EST MOD 30 MIN: CPT | Performed by: FAMILY MEDICINE

## 2024-11-11 PROCEDURE — 20611 DRAIN/INJ JOINT/BURSA W/US: CPT | Performed by: FAMILY MEDICINE

## 2024-11-11 RX ORDER — CELECOXIB 200 MG/1
200 CAPSULE ORAL DAILY
Qty: 90 CAPSULE | Refills: 0 | Status: SHIPPED | OUTPATIENT
Start: 2024-11-11 | End: 2025-02-09

## 2024-11-11 RX ORDER — LIDOCAINE HYDROCHLORIDE 10 MG/ML
6 INJECTION, SOLUTION INFILTRATION; PERINEURAL
Status: COMPLETED | OUTPATIENT
Start: 2024-11-11 | End: 2024-11-11

## 2024-11-11 RX ORDER — CYCLOBENZAPRINE HCL 5 MG
TABLET ORAL NIGHTLY
Status: CANCELLED | OUTPATIENT
Start: 2024-11-11 | End: 2024-11-25

## 2024-11-11 RX ORDER — TRIAMCINOLONE ACETONIDE 40 MG/ML
40 INJECTION, SUSPENSION INTRA-ARTICULAR; INTRAMUSCULAR
Status: COMPLETED | OUTPATIENT
Start: 2024-11-11 | End: 2024-11-11

## 2024-11-11 RX ORDER — DICLOFENAC SODIUM 10 MG/G
4 GEL TOPICAL 3 TIMES DAILY PRN
Qty: 450 G | Refills: 0 | Status: SHIPPED | OUTPATIENT
Start: 2024-11-11 | End: 2025-01-10

## 2024-11-11 NOTE — PROGRESS NOTES
Established Patient Follow-Up Visit    CC: No chief complaint on file.      HPI:  Abhishek is a 89 y.o. male returns here today for follow-up visit regarding: Pain discomfort to the right shoulder and right knee.  He is here for repeat injections.  Tolerated previous injections quite well thought that the ultrasound-guided injections provided him a lot more lasting relief he feels that that steroid shots are just trying to wear off.  He is here today for repeat right shoulder and right knee injections.  He also complains of some mild pain discomfort throughout the hands uncertain whether or not this is carpal tunnel related or osteoarthritis.  He did also ask for a refill of his Celebrex which was provided by my colleague Dr. Denis Meyer.          REVIEW OF SYSTEMS:  GENERAL: Negative for malaise, significant weight loss, fever  MUSCULOSKELETAL: See HPI  NEURO: Negative for numbness / tingling       PHYSICAL EXAM:  -Neuro: Gross sensation intact to the upper extremities bilaterally.  -Extremity: Right shoulder exam demonstrates obvious crepitus no redness warmth or erythema soft tissue tenderness to palpation posteriorly laterally and anteriorly about the shoulder.  Right knee demonstrates obvious osteoarthritic changes calf soft nontender full flexion extension about the knee here today.  Bilateral hands demonstrate obvious osteoarthritic changes ability to grasp and  equal and symmetric bilaterally.    IMAGING: No new imaging today  Point of Care Ultrasound  These images are not reportable by radiology and will not be interpreted   by  Radiologists.      PROCEDURE: Right shoulder and right knee injections as below  L Inj/Asp: R knee on 11/11/2024 5:59 PM  Indications: pain and joint swelling  Details: 22 G needle, ultrasound-guided superolateral approach  Medications: 6 mL lidocaine 10 mg/mL (1 %); 40 mg triamcinolone acetonide 40 mg/mL  Outcome: tolerated well, no immediate complications  Procedure,  treatment alternatives, risks and benefits explained, specific risks discussed. Consent was given by the patient. Immediately prior to procedure a time out was called to verify the correct patient, procedure, equipment, support staff and site/side marked as required. Patient was prepped and draped in the usual sterile fashion.       L Inj/Asp: R glenohumeral on 11/11/2024 6:00 PM  Indications: pain  Details: 22 G needle, ultrasound-guided posterior approach  Medications: 6 mL lidocaine 10 mg/mL (1 %); 40 mg triamcinolone acetonide 40 mg/mL  Outcome: tolerated well, no immediate complications  Procedure, treatment alternatives, risks and benefits explained, specific risks discussed. Consent was given by the patient. Immediately prior to procedure a time out was called to verify the correct patient, procedure, equipment, support staff and site/side marked as required. Patient was prepped and draped in the usual sterile fashion.            ASSESSMENT:   Follow-up visit for:  Problem List Items Addressed This Visit       Knee osteoarthritis    Relevant Orders    Point of Care Ultrasound (Completed)    Osteoarthritis, shoulder    Relevant Orders    Point of Care Ultrasound (Completed)        PLAN: Patient received and tolerated both injections well here today.  Recommended topical Voltaren gel to massage into his hands for the osteoarthritis.  He can follow-up with Dr. Denis Meyer or see one of our hand or wrist specialist if there is any persistent worsening issues or concerns for carpal tunnel type syndrome.  Additionally patient had requested for refill of his Celebrex.  Chart review indicates that the patient is currently on a long-term blood thinner however he has received multiple refills of Celebrex in the past and intermittently uses it without incident.  Discussed with the patient as a professional courtesy given patient of our practice I will refill his Celebrex.  Will plan to see the patient back in 3 months  for repeat evaluation and consideration of serial knee and shoulder injections going forward.  Orders Placed This Encounter    Point of Care Ultrasound           At the conclusion of the visit there were no further questions by the patient/family regarding their plan of care.  Patient was instructed to call or return with any issues, questions, or concerns regarding their injury and/or treatment plan described above.     11/11/24 at 2:33 PM - Cole C Budinsky, MD    Office: [phone]    This note was prepared using voice recognition software.  The details of this note are correct and have been reviewed, and corrected to the best of my ability.  Some grammatical errors may persist related to the Dragon software.

## 2024-11-12 ENCOUNTER — APPOINTMENT (OUTPATIENT)
Dept: AUDIOLOGY | Facility: CLINIC | Age: 89
End: 2024-11-12
Payer: MEDICARE

## 2024-11-12 DIAGNOSIS — H90.3 BILATERAL SENSORINEURAL HEARING LOSS: Primary | ICD-10-CM

## 2024-11-12 NOTE — PROGRESS NOTES
Mr. Abel's daughter arrived today on the patient's behalf due to mobility and transportation issues for a clean and check on his binaural Phonak Sidestageeo M50-13 hearing aids.  His daughter reported that his TV volume has gotten quite high.    Procedure:  Hearing aids were cleaned.  Cerustop wax trap, closed dome and retention wire was replaced for left device and noted to be in good, working condition.  Right hearing aid was cleaned but unfortunately noted to be nonfunctional needing  replacement.  Right  will be ordered and the patient's daughter will be contacted when it arrives.

## 2024-11-20 ENCOUNTER — CLINICAL SUPPORT (OUTPATIENT)
Dept: AUDIOLOGY | Facility: CLINIC | Age: 89
End: 2024-11-20

## 2024-11-20 DIAGNOSIS — H90.3 BILATERAL SENSORINEURAL HEARING LOSS: Primary | ICD-10-CM

## 2024-11-20 NOTE — PROGRESS NOTES
Mr. Abel's daughter returned today on the patient's behalf to  the patient's right Phonak Good Seedeo B50-13 hearing aid following size 3 SX  replacement.     Procedure:  Dispensed repaired device.  Upon request, medium closed domes were also replaced with medium power domes on both devices.  Payment for out of warranty  replacement was accepted.  They will return as needed.

## 2025-02-10 ENCOUNTER — APPOINTMENT (OUTPATIENT)
Dept: ORTHOPEDIC SURGERY | Facility: CLINIC | Age: OVER 89
End: 2025-02-10
Payer: MEDICARE

## 2025-02-10 ENCOUNTER — HOSPITAL ENCOUNTER (OUTPATIENT)
Dept: RADIOLOGY | Facility: EXTERNAL LOCATION | Age: OVER 89
Discharge: HOME | End: 2025-02-10

## 2025-02-10 DIAGNOSIS — M19.011 PRIMARY OSTEOARTHRITIS OF RIGHT SHOULDER: ICD-10-CM

## 2025-02-10 DIAGNOSIS — M17.11 PRIMARY OSTEOARTHRITIS OF RIGHT KNEE: ICD-10-CM

## 2025-02-10 PROCEDURE — 20611 DRAIN/INJ JOINT/BURSA W/US: CPT | Performed by: FAMILY MEDICINE

## 2025-02-10 PROCEDURE — 1160F RVW MEDS BY RX/DR IN RCRD: CPT | Performed by: FAMILY MEDICINE

## 2025-02-10 PROCEDURE — 99214 OFFICE O/P EST MOD 30 MIN: CPT | Performed by: FAMILY MEDICINE

## 2025-02-10 PROCEDURE — 1036F TOBACCO NON-USER: CPT | Performed by: FAMILY MEDICINE

## 2025-02-10 PROCEDURE — 1159F MED LIST DOCD IN RCRD: CPT | Performed by: FAMILY MEDICINE

## 2025-02-10 RX ORDER — LIDOCAINE HYDROCHLORIDE 10 MG/ML
6 INJECTION, SOLUTION INFILTRATION; PERINEURAL
Status: COMPLETED | OUTPATIENT
Start: 2025-02-10 | End: 2025-02-10

## 2025-02-10 RX ORDER — TRIAMCINOLONE ACETONIDE 40 MG/ML
40 INJECTION, SUSPENSION INTRA-ARTICULAR; INTRAMUSCULAR
Status: COMPLETED | OUTPATIENT
Start: 2025-02-10 | End: 2025-02-10

## 2025-02-10 RX ADMIN — LIDOCAINE HYDROCHLORIDE 6 ML: 10 INJECTION, SOLUTION INFILTRATION; PERINEURAL at 13:10

## 2025-02-10 RX ADMIN — TRIAMCINOLONE ACETONIDE 40 MG: 40 INJECTION, SUSPENSION INTRA-ARTICULAR; INTRAMUSCULAR at 13:10

## 2025-02-10 NOTE — PROGRESS NOTES
Established Patient Follow-Up Visit    CC:   Chief Complaint   Patient presents with    Right Knee - Follow-up     S/p inj 11/11/24    Right Shoulder - Follow-up     S/p inj 11/11/24       HPI:  Abhishek is a 89 y.o. male returns here today for follow-up visit regarding: Follow-up bilateral shoulder arthritis and right knee arthritis.  He is here today requesting repeat injections he is just at 3 months out from the last shots.  He denies any numbness tingling or burning.  Also doing well with the Voltaren gel when he remembers to take it to assist with his hand arthritis.          REVIEW OF SYSTEMS:  GENERAL: Negative for malaise, significant weight loss, fever  MUSCULOSKELETAL: See HPI  NEURO: Negative for numbness / tingling       PHYSICAL EXAM:  -Neuro: The bilateral upper and lower extremities were examined and demonstrated intact sensation medially and laterally.  -Extremity: Right shoulder demonstrates limited range of motion some crepitus some mild discomfort over the anterior lateral and posterior soft tissues for the shoulder.  Forward flexion to 90 lateral abduction to 75.  Right knee demonstrates ability to straighten out to 0 flex to 95 small joint effusion.  Small patellar crepitus.  No open cuts wounds or sores no redness or erythema over the target area of injections.  No new imaging today    IMAGING: No new imaging today  Point of Care Ultrasound  These images are not reportable by radiology and will not be interpreted   by  Radiologists.      PROCEDURE: Right shoulder knee injections as below  L Inj/Asp: R knee on 2/10/2025 1:10 PM  Indications: pain and joint swelling  Details: 22 G needle, ultrasound-guided superolateral approach  Medications: 6 mL lidocaine 10 mg/mL (1 %); 40 mg triamcinolone acetonide 40 mg/mL  Outcome: tolerated well, no immediate complications  Procedure, treatment alternatives, risks and benefits explained, specific risks discussed. Consent was given by the patient.  Immediately prior to procedure a time out was called to verify the correct patient, procedure, equipment, support staff and site/side marked as required. Patient was prepped and draped in the usual sterile fashion.       L Inj/Asp: R glenohumeral on 2/10/2025 1:10 PM  Indications: pain  Details: 22 G needle, ultrasound-guided posterior approach  Medications: 40 mg triamcinolone acetonide 40 mg/mL; 6 mL lidocaine 10 mg/mL (1 %)  Outcome: tolerated well, no immediate complications  Procedure, treatment alternatives, risks and benefits explained, specific risks discussed. Consent was given by the patient. Immediately prior to procedure a time out was called to verify the correct patient, procedure, equipment, support staff and site/side marked as required. Patient was prepped and draped in the usual sterile fashion.            ASSESSMENT:   Follow-up visit for:  Problem List Items Addressed This Visit       Knee osteoarthritis    Relevant Orders    Point of Care Ultrasound (Completed)    Osteoarthritis, shoulder    Relevant Orders    Point of Care Ultrasound (Completed)        PLAN: Patient was provided with repeat intra-articular injections to the right knee and the right shoulder.  We will see him back in 3 months for repeat evaluation and consider repeat serial knee and shoulder injections.  He will continue with his topical Voltaren gel to the hands.  Plan to continue with serial injections 3 months or greater intervals going forward until his injections are no longer effective.  Orders Placed This Encounter    L Inj/Asp    L Inj/Asp    Point of Care Ultrasound           At the conclusion of the visit there were no further questions by the patient/family regarding their plan of care.  Patient was instructed to call or return with any issues, questions, or concerns regarding their injury and/or treatment plan described above.     02/10/25 at 1:22 PM - Cole C Budinsky, MD    Office: [phone]    This note was  prepared using voice recognition software.  The details of this note are correct and have been reviewed, and corrected to the best of my ability.  Some grammatical errors may persist related to the Dragon software.

## 2025-03-11 ENCOUNTER — APPOINTMENT (OUTPATIENT)
Dept: CARDIOLOGY | Facility: CLINIC | Age: OVER 89
End: 2025-03-11
Payer: MEDICARE

## 2025-03-11 VITALS
WEIGHT: 230.6 LBS | HEIGHT: 72 IN | DIASTOLIC BLOOD PRESSURE: 64 MMHG | SYSTOLIC BLOOD PRESSURE: 118 MMHG | HEART RATE: 68 BPM | BODY MASS INDEX: 31.23 KG/M2

## 2025-03-11 DIAGNOSIS — I50.9 CONGESTIVE HEART FAILURE, UNSPECIFIED HF CHRONICITY, UNSPECIFIED HEART FAILURE TYPE: ICD-10-CM
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
## 2025-03-11 DIAGNOSIS — E66.3 OVERWEIGHT WITH BODY MASS INDEX (BMI) OF 28 TO 28.9 IN ADULT: ICD-10-CM

## 2025-03-11 DIAGNOSIS — I26.93 SINGLE SUBSEGMENTAL PULMONARY EMBOLISM WITHOUT ACUTE COR PULMONALE: ICD-10-CM

## 2025-03-11 DIAGNOSIS — Z87.891 FORMER SMOKER: ICD-10-CM

## 2025-03-11 DIAGNOSIS — I25.10 ATHEROSCLEROSIS OF NATIVE CORONARY ARTERY OF NATIVE HEART WITHOUT ANGINA PECTORIS: ICD-10-CM

## 2025-03-11 DIAGNOSIS — I10 HYPERTENSION, UNSPECIFIED TYPE: ICD-10-CM

## 2025-03-11 DIAGNOSIS — I10 PRIMARY HYPERTENSION: ICD-10-CM

## 2025-03-11 DIAGNOSIS — E78.5 HYPERLIPIDEMIA, UNSPECIFIED HYPERLIPIDEMIA TYPE: ICD-10-CM

## 2025-03-11 DIAGNOSIS — Z79.01 LONG TERM (CURRENT) USE OF ANTICOAGULANTS: ICD-10-CM

## 2025-03-11 DIAGNOSIS — R06.00 DYSPNEA, UNSPECIFIED TYPE: ICD-10-CM

## 2025-03-11 PROCEDURE — 99214 OFFICE O/P EST MOD 30 MIN: CPT | Performed by: INTERNAL MEDICINE

## 2025-03-11 PROCEDURE — 3074F SYST BP LT 130 MM HG: CPT | Performed by: INTERNAL MEDICINE

## 2025-03-11 PROCEDURE — 3078F DIAST BP <80 MM HG: CPT | Performed by: INTERNAL MEDICINE

## 2025-03-11 RX ORDER — ATORVASTATIN CALCIUM 10 MG/1
TABLET, FILM COATED ORAL
Qty: 90 TABLET | Refills: 3 | Status: SHIPPED | OUTPATIENT
Start: 2025-03-11

## 2025-03-11 RX ORDER — FUROSEMIDE 20 MG/1
20 TABLET ORAL DAILY
Qty: 90 TABLET | Refills: 3 | Status: SHIPPED | OUTPATIENT
Start: 2025-03-11

## 2025-03-11 RX ORDER — LISINOPRIL 5 MG/1
5 TABLET ORAL DAILY
Qty: 90 TABLET | Refills: 3 | Status: SHIPPED | OUTPATIENT
Start: 2025-03-11

## 2025-03-11 RX ORDER — ISOSORBIDE MONONITRATE 60 MG/1
TABLET, EXTENDED RELEASE ORAL
Qty: 90 TABLET | Refills: 3 | Status: SHIPPED | OUTPATIENT
Start: 2025-03-11

## 2025-03-11 RX ORDER — CARVEDILOL 12.5 MG/1
12.5 TABLET ORAL
Qty: 180 TABLET | Refills: 3 | Status: SHIPPED | OUTPATIENT
Start: 2025-03-11 | End: 2026-03-11

## 2025-03-11 RX ORDER — LISINOPRIL 10 MG/1
10 TABLET ORAL DAILY
Qty: 90 TABLET | Refills: 3 | Status: SHIPPED | OUTPATIENT
Start: 2025-03-11

## 2025-03-11 NOTE — PROGRESS NOTES
CARDIOLOGY OFFICE NOTE     Date:   3/11/2025    Patient:    Abhishek Abel    YOB: 1934    Primary Physician: Ha Ahuja MD       Reason for Visit: 6-month cardiology follow-up.    HPI:     Abhishek Abel was seen in cardiac evaluation at the  Cardiology office March 11, 2025.      The patients problems are listed as in the impression below.    Electronic medical records reviewed.    Patient returns.  Feels well.  He is tired a little bit.  He is limited due to his degenerative joint disease.  He has no cardiac complaints.  He has not fallen.    Patient denies Chest Pain, SOB, Lightheadedness, Dizziness, TIA or CVA symptoms.  No CHF or Edema.  No Palpitations.  No GI,  or Bleeding Issues. No Recent Fever or Chills.     Cardiovascular and general review of systems is otherwise negative.    A 14-system review is otherwise negative, other than noted.     PHYSICAL EXAMINATION:      Vitals:    03/11/25 1320   BP: 118/64   Pulse: 68     General: No acute distress.  Alert and oriented.  Head And Neck Examination: No jugular venous distention, no carotid bruits, no mass. Carotid upstrokes preserved. Oral mucosa moist. No xanthelasma. Head and neck examination otherwise unremarkable.  Lungs: Clear to auscultation and percussion. No wheezes, no rales, and no rhonchi.  Chest: Excursion appeared to be normal. No chest wall tenderness on palpation.  Heart: Normal S1 and S2. No S3. No S4. No rub. Grade 1/6 systolic murmur, best heard at the left sternal border. Point of maximal impulse was within normal limits.  Abdomen: Soft. Nontender. No organomegaly. No bruits. No masses. Obese.  Extremities: No bipedal edema. No clubbing. No cyanosis. Pulses are strong throughout. No bruits.  Right arm rotator cuff injury with decreased range of motion.  Musculoskeletal Exam: No ulcers, otherwise unremarkable.  Neuro: Neurologically appeared grossly intact.  .  IMPRESSION:       Cardiovascular status  stable  Asymptomatic  Shortness of breath, resolved  Falls, mechanical  Pulmonary embolism right 5/2023  Long-term Eliquis  Coronary artery disease with negative Myoview perfusion stress test 5/2023  Normal left ventricular function, LVEF 72%  Hypertension  Hyperlipidemia  Reported congestive heart failure  Obesity  Degenerative joint disease  Right rotator cuff injury.  Past smoker  Otherwise as per assessment below.    RECOMMENDATIONS:      Patient overall is doing well from a cardiovascular standpoint.  Would suggest that he continue his current medications.  Refills were provided.    Exercise dietary program was encouraged.  Walking.    Hydration.    [employer] portal use was encouraged.    We will plan to see back in 6 months with Laboratory Studies and ECG as ordered.     Patient will follow up with their primary physician for general care.    The patient knows to contact medical care earlier if need be.      ALLERGIES:     Allergies   Allergen Reactions    Penicillins Unknown        MEDICATIONS:     Current Outpatient Medications   Medication Instructions    acetaminophen (TYLENOL 8 HOUR) 650 mg, 2 times daily PRN    apixaban (ELIQUIS) 2.5 mg, 2 times daily    aspirin 81 mg capsule 1 capsule, Daily    atorvastatin (Lipitor) 10 mg tablet 1 TABLET DAILY    budesonide (PULMICORT) 0.25 mg, Daily RT    buPROPion (WELLBUTRIN) 100 mg, Daily    carvedilol (COREG) 12.5 mg, oral, 2 times daily (morning and late afternoon)    finasteride (PROSCAR) 5 mg, Daily    fluticasone (Flonase) 50 mcg/actuation nasal spray 2 sprays, Daily    furosemide (LASIX) 20 mg, oral, Daily    isosorbide mononitrate ER (Imdur) 60 mg 24 hr tablet 1 TABLET DAILY    lisinopril 10 mg, oral, Daily, TAKE 1 TABLET ALONG WITH 5MG TABLET    lisinopril 5 mg, oral, Daily, TAKE 1 TABLET ALONG WITH 10 MG TABLET    methocarbamol (ROBAXIN) 500 mg, Daily PRN    montelukast (SINGULAIR) 10 mg, Nightly    polyethylene glycol (MIRALAX) 17 g, Daily    potassium  chloride CR (Klor-Con M20) 20 mEq ER tablet 2 tablets, Daily       ELECTROCARDIOGRAM:      None this visit    CARDIAC TESTING:      None this visit    LABORATORY DATA:      None this visit.  Prior as noted below.    CBC:   Lab Results   Component Value Date    WBC 9.3 09/06/2024    RBC 4.04 (L) 09/06/2024    HGB 13.5 09/06/2024    HCT 40.2 (L) 09/06/2024     09/06/2024        CMP:    Lab Results   Component Value Date     09/06/2024    K 4.5 09/06/2024     09/06/2024    CO2 23 09/06/2024    BUN 15 09/06/2024    CREATININE 0.88 09/06/2024    GLUCOSE 91 09/06/2024    CALCIUM 8.9 09/06/2024       Magnesium:    Lab Results   Component Value Date    MG 1.98 09/06/2024       Lipid Profile:    Lab Results   Component Value Date    CHOL 136 09/06/2024    TRIG 184 (H) 09/06/2024    HDL 35.3 09/06/2024    LDLCALC 64 09/06/2024       Hepatic Function Panel:    Lab Results   Component Value Date    ALKPHOS 84 09/06/2024    ALT 15 09/06/2024    AST 15 09/06/2024    PROT 5.6 (L) 09/06/2024    BILITOT 0.5 09/06/2024    BILIDIR 0.1 09/06/2024       TSH:    Lab Results   Component Value Date    TSH 1.29 09/06/2024                   PROBLEM LIST:     Patient Active Problem List   Diagnosis    Bilateral sensorineural hearing loss    Impacted cerumen of right ear    Knee osteoarthritis    Osteoarthritis, shoulder    CAD (coronary atherosclerotic disease)    CHF (congestive heart failure)    Dyspnea    Falls    Former smoker    HTN (hypertension)    Long term (current) use of anticoagulants    Overweight with body mass index (BMI) of 28 to 28.9 in adult    Pulmonary embolism             Dar Talbert MD, [facility]   [facility] / [facility] /  Cardiology      Of Note:  [employer] voice recognition dictation software was utilized partially in the preparation of this note, therefore, inaccuracies in spelling, word choice and punctuation may have occurred which were not recognized the time of signing.    Patient was seen and  examined with total time of visit including chart preparation, rooming, and chart completion exceeding 40 minutes.      ----

## 2025-03-11 NOTE — PATIENT INSTRUCTIONS
Please bring all medicines, vitamins, and herbal supplements with you in original bottles to every appointment! This is the best way to ensure your medication list in your chart is accurate.    Prescriptions will not be filled unless you are compliant with your follow up appointments or have a follow up appointment scheduled as per instruction of your physician. Refills should be requested at the time of your visit.  FASTING LABS, FASTING FROM MIDNIGHT THE NIGHT BEFORE TO BE DONE 5-7 DAYS PRIOR TO YOUR APPOINTMENT WITH DR TALBERT IN 6 MONTHS       DID YOU KNOW  We have a pharmacy here in the [facility].  They can fill all prescriptions, not just cardiac medications.  Prescriptions from other pharmacies can easily be transferred to the  pharmacy by the  pharmacist on site.   pharmacies offer FREE HOME DELIVERY on medications to anywhere in [place]. They can sync your medications. Typically prescriptions can be ready in 10 - 15 minutes. If pharmacy is unable to fill your  prescription or if cost is more than your paying now the Pharmacist can easily transfer back to your Pharmacy of choice. Pharmacy phone # [phone].             Scribe Attestation  By signing my name below, IJoy CMA   , Scribe   attest that this documentation has been prepared under the direction and in the presence of Dar Talbert MD.

## 2025-03-19 DIAGNOSIS — M19.011 PRIMARY OSTEOARTHRITIS OF RIGHT SHOULDER: ICD-10-CM

## 2025-03-19 DIAGNOSIS — M17.11 PRIMARY OSTEOARTHRITIS OF RIGHT KNEE: ICD-10-CM

## 2025-03-19 RX ORDER — DICLOFENAC SODIUM 10 MG/G
4 GEL TOPICAL 3 TIMES DAILY PRN
Qty: 450 G | Refills: 1 | Status: SHIPPED | OUTPATIENT
Start: 2025-03-19 | End: 2025-06-02

## 2025-03-19 NOTE — TELEPHONE ENCOUNTER
Patient called and requested a refill on Voltaren Gel.  Pharmacy was verified.  RX pended for review.

## 2025-04-08 ENCOUNTER — APPOINTMENT (OUTPATIENT)
Dept: GENERAL RADIOLOGY | Age: 89
End: 2025-04-08
Payer: MEDICARE

## 2025-04-08 ENCOUNTER — APPOINTMENT (OUTPATIENT)
Dept: CT IMAGING | Age: 89
End: 2025-04-08
Payer: MEDICARE

## 2025-04-08 ENCOUNTER — HOSPITAL ENCOUNTER (EMERGENCY)
Age: 89
Discharge: HOME | End: 2025-04-08
Payer: MEDICARE

## 2025-04-08 VITALS
HEIGHT: 72 IN | DIASTOLIC BLOOD PRESSURE: 78 MMHG | RESPIRATION RATE: 14 BRPM | BODY MASS INDEX: 32.1 KG/M2 | SYSTOLIC BLOOD PRESSURE: 144 MMHG | WEIGHT: 237 LBS | TEMPERATURE: 97.4 F | OXYGEN SATURATION: 98 % | HEART RATE: 75 BPM

## 2025-04-08 DIAGNOSIS — J01.11 ACUTE RECURRENT FRONTAL SINUSITIS: Primary | ICD-10-CM

## 2025-04-08 LAB
ALBUMIN SERPL-MCNC: 3.7 G/DL (ref 3.5–4.6)
ALP SERPL-CCNC: 94 U/L (ref 35–104)
ALT SERPL-CCNC: 16 U/L (ref 0–41)
ANION GAP SERPL CALCULATED.3IONS-SCNC: 10 MEQ/L (ref 9–15)
AST SERPL-CCNC: 15 U/L (ref 0–40)
BASOPHILS # BLD: 0 K/UL (ref 0–0.2)
BASOPHILS NFR BLD: 0.4 %
BILIRUB SERPL-MCNC: 0.4 MG/DL (ref 0.2–0.7)
BUN SERPL-MCNC: 17 MG/DL (ref 8–23)
CALCIUM SERPL-MCNC: 8.9 MG/DL (ref 8.5–9.9)
CHLORIDE SERPL-SCNC: 105 MEQ/L (ref 95–107)
CO2 SERPL-SCNC: 21 MEQ/L (ref 20–31)
CREAT SERPL-MCNC: 0.7 MG/DL (ref 0.7–1.2)
EOSINOPHIL # BLD: 0.2 K/UL (ref 0–0.7)
EOSINOPHIL NFR BLD: 2.4 %
ERYTHROCYTE [DISTWIDTH] IN BLOOD BY AUTOMATED COUNT: 12.9 % (ref 11.5–14.5)
GLOBULIN SER CALC-MCNC: 2.2 G/DL (ref 2.3–3.5)
GLUCOSE SERPL-MCNC: 105 MG/DL (ref 70–99)
HCT VFR BLD AUTO: 41.2 % (ref 42–52)
HGB BLD-MCNC: 13.9 G/DL (ref 14–18)
INFLUENZA A BY PCR: NEGATIVE
INFLUENZA B BY PCR: NEGATIVE
LYMPHOCYTES # BLD: 3.3 K/UL (ref 1–4.8)
LYMPHOCYTES NFR BLD: 32.5 %
MCH RBC QN AUTO: 33.6 PG (ref 27–31.3)
MCHC RBC AUTO-ENTMCNC: 33.7 % (ref 33–37)
MCV RBC AUTO: 99.5 FL (ref 79–92.2)
MONOCYTES # BLD: 0.8 K/UL (ref 0.2–0.8)
MONOCYTES NFR BLD: 7.9 %
NEUTROPHILS # BLD: 5.8 K/UL (ref 1.4–6.5)
NEUTS SEG NFR BLD: 56.3 %
PLATELET # BLD AUTO: 209 K/UL (ref 130–400)
POTASSIUM SERPL-SCNC: 4.3 MEQ/L (ref 3.4–4.9)
PROT SERPL-MCNC: 5.9 G/DL (ref 6.3–8)
RBC # BLD AUTO: 4.14 M/UL (ref 4.7–6.1)
SARS-COV-2 RDRP RESP QL NAA+PROBE: NOT DETECTED
SODIUM SERPL-SCNC: 136 MEQ/L (ref 135–144)
WBC # BLD AUTO: 10.2 K/UL (ref 4.8–10.8)

## 2025-04-08 PROCEDURE — 71045 X-RAY EXAM CHEST 1 VIEW: CPT

## 2025-04-08 PROCEDURE — 99284 EMERGENCY DEPT VISIT MOD MDM: CPT

## 2025-04-08 PROCEDURE — 87502 INFLUENZA DNA AMP PROBE: CPT

## 2025-04-08 PROCEDURE — 87635 SARS-COV-2 COVID-19 AMP PRB: CPT

## 2025-04-08 PROCEDURE — 80053 COMPREHEN METABOLIC PANEL: CPT

## 2025-04-08 PROCEDURE — 70486 CT MAXILLOFACIAL W/O DYE: CPT

## 2025-04-08 PROCEDURE — 85025 COMPLETE CBC W/AUTO DIFF WBC: CPT

## 2025-04-08 RX ORDER — DOXYCYCLINE HYCLATE 100 MG
200 TABLET ORAL DAILY
Qty: 14 TABLET | Refills: 0 | Status: SHIPPED | OUTPATIENT
Start: 2025-04-08 | End: 2025-04-15

## 2025-04-08 ASSESSMENT — ENCOUNTER SYMPTOMS
SINUS PRESSURE: 1
COLOR CHANGE: 0
ABDOMINAL DISTENTION: 0
COUGH: 1
SINUS PAIN: 1
ABDOMINAL PAIN: 0
SORE THROAT: 0
EYE DISCHARGE: 0
RHINORRHEA: 0
SHORTNESS OF BREATH: 0
CONSTIPATION: 0

## 2025-04-08 ASSESSMENT — LIFESTYLE VARIABLES
HOW OFTEN DO YOU HAVE A DRINK CONTAINING ALCOHOL: NEVER
HOW MANY STANDARD DRINKS CONTAINING ALCOHOL DO YOU HAVE ON A TYPICAL DAY: PATIENT DOES NOT DRINK

## 2025-04-08 ASSESSMENT — PAIN - FUNCTIONAL ASSESSMENT: PAIN_FUNCTIONAL_ASSESSMENT: NONE - DENIES PAIN

## 2025-04-08 NOTE — ED NOTES
Discharge instructions given to pt and pts family at bedside. Awaiting pts family to bring clothes for the pt.

## 2025-04-08 NOTE — ED PROVIDER NOTES
[facility] EMERGENCY DEPARTMENT  EMERGENCY DEPARTMENT ENCOUNTER      Pt Name: Darion Naqvi  MRN: [id number]  Birthdate 4/7/1934  Date of evaluation: 4/8/2025  Provider: Ravi Porter PA-C  8:38 AM EDT    CHIEF COMPLAINT       Chief Complaint   Patient presents with    Shortness of Breath    Nasal Congestion         HISTORY OF PRESENT ILLNESS   (Location/Symptom, Timing/Onset, Context/Setting, Quality, Duration, Modifying Factors, Severity)  Note limiting factors.   Darion Naqvi is a 89 y.o. male who presents to the emergency department with complaint of sinus pressure, cough, which patient states been ongoing for approximately 1 week.  Patient states he does get frequent sinus infections, and over the last 1 week he has noticed significant amount of sinus drainage, patient states he does have increasing cough when he feels as if his sinuses are draining, he does not have any chest pain, no shortness of breath, no nausea vomiting, no bodyaches or fevers.  Patient states he is using over-the-counter sinus medication without any significant relief.  Past medical history significant for BPH, hypertension, coronary artery disease, arthritis.     HPI    Nursing Notes were reviewed.    REVIEW OF SYSTEMS    (2-9 systems for level 4, 10 or more for level 5)     Review of Systems   Constitutional:  Negative for activity change and appetite change.   HENT:  Positive for sinus pressure and sinus pain. Negative for congestion, ear discharge, ear pain, nosebleeds, rhinorrhea and sore throat.    Eyes:  Negative for discharge.   Respiratory:  Positive for cough. Negative for shortness of breath.    Cardiovascular:  Negative for chest pain, palpitations and leg swelling.   Gastrointestinal:  Negative for abdominal distention, abdominal pain and constipation.   Genitourinary:  Negative for difficulty urinating and dysuria.   Musculoskeletal:  Negative for arthralgias.   Skin:  Negative for color change.   Neurological:

## 2025-04-08 NOTE — ED TRIAGE NOTES
Pt BIB EMS from home with his daughter. Pt complaining of congestion and SOB x2 days. Pt also complaining of headache starting yesterday.

## 2025-05-12 NOTE — PROGRESS NOTES
CARDIOLOGY OFFICE NOTE     Date:   3/11/2024    Patient:    Abhishek Abel    YOB: 1934    Primary Physician: Ha Ahuja MD       Reason for Visit: 4-month follow-up    HPI:     Abhishek Abel was seen in cardiac evaluation at the  Cardiology office March 11, 2024.      The patients problems are listed as in the impression below.    Electronic medical records reviewed.    Patient returns.  He feels well.  He is asymptomatic overall.  He is active without limitations.    He had had his Coreg increased to 12 and half twice daily and has been tolerating this well.    His only complaints are numbness in his hands he has been told he has carpal tunnel disease bilaterally.    Patient denies Chest Pain, SOB, Lightheadedness, Dizziness, TIA or CVA symptoms.  No CHF or Edema.  No Palpitations.  No GI,  or Bleeding Issues. No Recent Fever or Chills.     Cardiovascular and general review of systems is otherwise negative.    A 14-system review is otherwise negative, other than noted.     PHYSICAL EXAMINATION:      Vitals:    03/11/24 1336   BP: 122/66   Pulse: 67     General: No acute distress. Vital signs as noted. Alert and oriented.  Head And Neck Examination: No jugular venous distention, no carotid bruits, no mass. Carotid upstrokes preserved. Oral mucosa moist. No xanthelasma. Head and neck examination otherwise unremarkable.  Lungs: Clear to auscultation and percussion. No wheezes, no rales, and no rhonchi.  Chest: Excursion appeared to be normal. No chest wall tenderness on palpation.  Heart: Normal S1 and S2. No S3. No S4. No rub. Grade 1/6 systolic murmur, best heard at the left sternal border. Point of maximal impulse was within normal limits.  Abdomen: Soft. Nontender. No organomegaly. No bruits. No masses. Obese.  Extremities: No bipedal edema. No clubbing. No cyanosis. Pulses are strong throughout. No bruits.  Musculoskeletal Exam: No ulcers, otherwise unremarkable.  Neuro:  Neurologically appeared grossly intact.  .  IMPRESSION:       Cardiovascular status stable  Asymptomatic  Shortness of breath, resolved  Falls, mechanical  Pulmonary embolism right 5/2023  Long-term Eliquis  Pneumonia  Coronary artery disease with negative Myoview perfusion stress test 5/2023  Normal left ventricular function, LVEF 72%  Hypertension  Hyperlipidemia  Reported congestive heart failure  Obesity  Degenerative joint disease  Past smoker  Otherwise as per assessment below.    RECOMMENDATIONS:      Patient continues to do well overall.  Would suggest continuing his current medication.  Refills were provided.    Exercise dietary weight reduction program was encouraged.    Hydration was encouraged.    [employer] use was encouraged.    We will plan to see back in 6 months with Laboratory Studies and ECG as ordered.     Patient will follow up with their primary physician for general care.    The patient knows to contact medical care earlier if need be.      ALLERGIES:     Allergies   Allergen Reactions    Penicillins Unknown        MEDICATIONS:     Current Outpatient Medications   Medication Instructions    acetaminophen (TYLENOL 8 HOUR) 650 mg, oral, 2 times daily PRN, Do not crush, chew, or split.    apixaban (ELIQUIS) 2.5 mg, oral, 2 times daily    aspirin 81 mg capsule 1 capsule, oral, Daily    atorvastatin (LIPITOR) 10 mg, oral, Daily    budesonide (PULMICORT) 0.25 mg, nebulization, Daily RT, Rinse mouth with water after use to reduce aftertaste and incidence of candidiasis. Do not swallow.    buPROPion (WELLBUTRIN) 100 mg, oral, Daily    carvedilol (COREG) 12.5 mg, oral, 2 times daily with meals    carvedilol (COREG) 6.25 mg, oral, 2 times daily    finasteride (PROSCAR) 5 mg, oral, Daily    fluticasone (Flonase) 50 mcg/actuation nasal spray 2 sprays, nasal, Daily    furosemide (LASIX) 20 mg, oral, Daily    isosorbide mononitrate ER (IMDUR) 60 mg, oral, Daily    lisinopril 5 mg, oral, Daily, TAKE 1  TABLET ALONG WITH 10 MG TABLET    lisinopril 10 mg, oral, Daily, TAKE 1 TABLET ALONG WITH 5MG TABLET    methocarbamol (ROBAXIN) 500 mg, oral, Daily PRN    montelukast (SINGULAIR) 10 mg, oral, Nightly    polyethylene glycol (MIRALAX) 17 g, oral, Daily, Mix 1 capful (17GM) in 8 ounces of water, juice or tea    potassium chloride CR (Klor-Con M20) 20 mEq ER tablet 2 tablets, oral, Daily, Do not crush or chew.       ELECTROCARDIOGRAM:      Sinus rhythm, incomplete right bundle branch block, inferior MI by Q's.  Rate 67.    CARDIAC TESTING:      None    LABORATORY DATA:      None recently.            PROBLEM LIST:     Patient Active Problem List   Diagnosis    Bilateral sensorineural hearing loss    Impacted cerumen of right ear    Knee osteoarthritis    Osteoarthritis, shoulder    CAD (coronary atherosclerotic disease)    CHF (congestive heart failure) (CMS/[facility])    Dyspnea    Falls    Former smoker    HTN (hypertension)    Long term (current) use of anticoagulants    Overweight with body mass index (BMI) of 28 to 28.9 in adult    Pulmonary embolism (CMS/[facility])             Dar Talbert MD, FACC   [facility] / [facility] /  Cardiology      Of Note:  [employer] voice recognition dictation software was utilized partially in the preparation of this note, therefore, inaccuracies in spelling, word choice and punctuation may have occurred which were not recognized the time of signing.    Patient was seen and examined with total time of visit including chart preparation, rooming, and chart completion exceeding 40 minutes.      ----   unable to assess

## 2025-06-02 ENCOUNTER — APPOINTMENT (OUTPATIENT)
Dept: ORTHOPEDIC SURGERY | Facility: CLINIC | Age: OVER 89
End: 2025-06-02
Payer: MEDICARE

## 2025-06-18 ENCOUNTER — APPOINTMENT (OUTPATIENT)
Dept: ORTHOPEDIC SURGERY | Facility: CLINIC | Age: OVER 89
End: 2025-06-18
Payer: MEDICARE

## 2025-06-18 ENCOUNTER — HOSPITAL ENCOUNTER (OUTPATIENT)
Dept: RADIOLOGY | Facility: EXTERNAL LOCATION | Age: OVER 89
Discharge: HOME | End: 2025-06-18

## 2025-06-18 DIAGNOSIS — M17.11 PRIMARY OSTEOARTHRITIS OF RIGHT KNEE: ICD-10-CM

## 2025-06-18 DIAGNOSIS — M19.011 PRIMARY OSTEOARTHRITIS OF RIGHT SHOULDER: ICD-10-CM

## 2025-06-18 PROCEDURE — 1159F MED LIST DOCD IN RCRD: CPT | Performed by: FAMILY MEDICINE

## 2025-06-18 PROCEDURE — 20611 DRAIN/INJ JOINT/BURSA W/US: CPT | Performed by: FAMILY MEDICINE

## 2025-06-18 PROCEDURE — 99214 OFFICE O/P EST MOD 30 MIN: CPT | Performed by: FAMILY MEDICINE

## 2025-06-18 PROCEDURE — 1036F TOBACCO NON-USER: CPT | Performed by: FAMILY MEDICINE

## 2025-06-18 RX ORDER — LIDOCAINE HYDROCHLORIDE 10 MG/ML
6 INJECTION, SOLUTION INFILTRATION; PERINEURAL
Status: COMPLETED | OUTPATIENT
Start: 2025-06-18 | End: 2025-06-18

## 2025-06-18 RX ORDER — TRIAMCINOLONE ACETONIDE 40 MG/ML
40 INJECTION, SUSPENSION INTRA-ARTICULAR; INTRAMUSCULAR
Status: COMPLETED | OUTPATIENT
Start: 2025-06-18 | End: 2025-06-18

## 2025-06-18 RX ADMIN — LIDOCAINE HYDROCHLORIDE 6 ML: 10 INJECTION, SOLUTION INFILTRATION; PERINEURAL at 13:56

## 2025-06-18 RX ADMIN — LIDOCAINE HYDROCHLORIDE 6 ML: 10 INJECTION, SOLUTION INFILTRATION; PERINEURAL at 13:55

## 2025-06-18 RX ADMIN — TRIAMCINOLONE ACETONIDE 40 MG: 40 INJECTION, SUSPENSION INTRA-ARTICULAR; INTRAMUSCULAR at 13:55

## 2025-06-18 RX ADMIN — TRIAMCINOLONE ACETONIDE 40 MG: 40 INJECTION, SUSPENSION INTRA-ARTICULAR; INTRAMUSCULAR at 13:56

## 2025-06-18 NOTE — PROGRESS NOTES
Follow-Up Patient Visit: Upper Extremity Injury  Assessment & Plan  Bilateral shoulder arthritis  Chronic bilateral shoulder arthritis improved with steroid injections. Right shoulder injected today.  - Administer steroid injection to the right shoulder.  - Advise use of Voltaren gel on the shoulder, knee, and hands three to four times daily.  - Schedule follow-up in four to five months for potential repeat steroid injection.  - Advise against hot tub soaks or baths for a couple of days post-injection.    Right knee arthritis  Chronic right knee arthritis with intermittent pain. Steroid injections effective. Gel injections considered if steroids are insufficient.  - Administer steroid injection to the right knee.  - Consider gel injections for the knee if pain returns before three months.  - Advise use of Voltaren gel on the knee three to four times daily.    Hand arthritis  Chronic hand arthritis improved with Voltaren gel.  - Continue use of Voltaren gel on the hands three to four times daily.    Orders Placed This Encounter    L Inj/Asp    L Inj/Asp    Point of Care Ultrasound     1. Primary osteoarthritis of right shoulder    2. Primary osteoarthritis of right knee      L Inj/Asp: R knee on 6/18/2025 1:55 PM  Indications: pain and joint swelling  Details: 22 G needle, ultrasound-guided superolateral approach  Medications: 6 mL lidocaine 10 mg/mL (1 %); 40 mg triamcinolone acetonide 40 mg/mL  Outcome: tolerated well, no immediate complications  Procedure, treatment alternatives, risks and benefits explained, specific risks discussed. Consent was given by the patient. Immediately prior to procedure a time out was called to verify the correct patient, procedure, equipment, support staff and site/side marked as required. Patient was prepped and draped in the usual sterile fashion.       L Inj/Asp: R glenohumeral on 6/18/2025 1:56 PM  Indications: pain  Details: 22 G needle, ultrasound-guided posterior  approach  Medications: 40 mg triamcinolone acetonide 40 mg/mL; 6 mL lidocaine 10 mg/mL (1 %)  Outcome: tolerated well, no immediate complications  Procedure, treatment alternatives, risks and benefits explained, specific risks discussed. Consent was given by the patient. Immediately prior to procedure a time out was called to verify the correct patient, procedure, equipment, support staff and site/side marked as required. Patient was prepped and draped in the usual sterile fashion.         At the conclusion of the visit there were no further questions by the patient/family regarding their plan of care.  Patient was instructed to call or return with any issues, questions, or concerns regarding their injury and/or treatment plan described above.    PHYSICAL EXAM:  Neuro: Gross sensation intact to the upper and lower extremities bilaterally.  Extremity: Right shoulder with significant crepitus limited range of motion forward flexion to 75 degrees lateral abduction to 45 degrees limited internal and external rotation  strength about the hand and wrist is normal crepitus noted throughout the shoulder there is no open cuts wounds or sores noted.  Right knee demonstrates minimal tenderness to the medial and lateral joint line small joint effusion is noted full intact extensor mechanism after 0 flexion well past 90 calf is soft nontender.  No open cuts wounds or sores noted over the targeted area injection.    Physical Exam  MUSCULOSKELETAL: Right knee with mild effusion.    IMAGING:   Results    Point of Care Ultrasound  These images are not reportable by radiology and will not be interpreted   by  Radiologists.       HPI  Patient ID: Abhishek bAel is a 89 y.o. male who presents for Follow-up of the Right Knee (S/p inj 2/10/25/OA) and Follow-up of the Right Shoulder (S/p inj 2/10/25/OA).  History of Present Illness  Abhishek Abel is a 89 year old male who presents with requests for repeat injections to the right  shoulder and knee.    He returns for repeat injections to his right shoulder and knee. The last injection was administered in February, approximately five months ago, and provided significant relief. He is seeking another injection as the previous one has worn off.    His knee hasn't been particularly painful, but he experiences discomfort when he attempts to use it, describing it as 'painful'.    He has been using Voltaren gel for his hands, which has significantly alleviated his arthritis symptoms. He has not been using it on his knee or shoulder.    He has been experiencing significant improvement in his shoulder pain since starting treatment, noting that before treatment, he had constant pain.            This medical note was created with the assistance of artificial intelligence (AI) for documentation purposes. The content has been reviewed and confirmed by the healthcare provider for accuracy and completeness. Patient consented to the use of audio recording and use of AI during their visit.   06/18/25 at 1:57 PM - Cole C Budinsky, MD  Office:  [phone]

## 2025-09-04 LAB
ALBUMIN SERPL-MCNC: 3.9 G/DL (ref 3.6–5.1)
ALBUMIN/GLOB SERPL: 2.3 (CALC) (ref 1–2.5)
ALP SERPL-CCNC: 74 U/L (ref 35–144)
ALT SERPL-CCNC: 13 U/L (ref 9–46)
ANION GAP SERPL CALCULATED.4IONS-SCNC: 10 MMOL/L (CALC) (ref 7–17)
AST SERPL-CCNC: 12 U/L (ref 10–35)
BILIRUB DIRECT SERPL-MCNC: 0.1 MG/DL
BILIRUB INDIRECT SERPL-MCNC: 0.4 MG/DL (CALC) (ref 0.2–1.2)
BILIRUB SERPL-MCNC: 0.5 MG/DL (ref 0.2–1.2)
BUN SERPL-MCNC: 16 MG/DL (ref 7–25)
BUN/CREAT SERPL: NORMAL (CALC) (ref 6–22)
CALCIUM SERPL-MCNC: 8.7 MG/DL (ref 8.6–10.3)
CHLORIDE SERPL-SCNC: 104 MMOL/L (ref 98–110)
CHOLEST SERPL-MCNC: 138 MG/DL
CHOLEST/HDLC SERPL: 3.2 (CALC)
CO2 SERPL-SCNC: 22 MMOL/L (ref 20–32)
CREAT SERPL-MCNC: 0.77 MG/DL (ref 0.7–1.22)
EGFRCR SERPLBLD CKD-EPI 2021: 85 ML/MIN/1.73M2
ERYTHROCYTE [DISTWIDTH] IN BLOOD BY AUTOMATED COUNT: 12.8 % (ref 11–15)
GLOBULIN SER CALC-MCNC: 1.7 G/DL (CALC) (ref 1.9–3.7)
GLUCOSE SERPL-MCNC: 86 MG/DL (ref 65–99)
HCT VFR BLD AUTO: 40.6 % (ref 38.5–50)
HDLC SERPL-MCNC: 43 MG/DL
HGB BLD-MCNC: 13.5 G/DL (ref 13.2–17.1)
LDLC SERPL CALC-MCNC: 74 MG/DL (CALC)
MAGNESIUM SERPL-MCNC: 2.2 MG/DL (ref 1.5–2.5)
MCH RBC QN AUTO: 33.8 PG (ref 27–33)
MCHC RBC AUTO-ENTMCNC: 33.3 G/DL (ref 32–36)
MCV RBC AUTO: 101.8 FL (ref 80–100)
NONHDLC SERPL-MCNC: 95 MG/DL (CALC)
PLATELET # BLD AUTO: 204 THOUSAND/UL (ref 140–400)
PMV BLD REES-ECKER: 8.6 FL (ref 7.5–12.5)
POTASSIUM SERPL-SCNC: 4.3 MMOL/L (ref 3.5–5.3)
PROT SERPL-MCNC: 5.6 G/DL (ref 6.1–8.1)
RBC # BLD AUTO: 3.99 MILLION/UL (ref 4.2–5.8)
SODIUM SERPL-SCNC: 136 MMOL/L (ref 135–146)
TRIGL SERPL-MCNC: 120 MG/DL
TSH SERPL-ACNC: 0.86 MIU/L (ref 0.4–4.5)
WBC # BLD AUTO: 10.6 THOUSAND/UL (ref 3.8–10.8)

## 2025-09-09 ENCOUNTER — APPOINTMENT (OUTPATIENT)
Dept: CARDIOLOGY | Facility: CLINIC | Age: OVER 89
End: 2025-09-09
Payer: MEDICARE

## 2025-10-06 ENCOUNTER — APPOINTMENT (OUTPATIENT)
Dept: ORTHOPEDIC SURGERY | Facility: CLINIC | Age: OVER 89
End: 2025-10-06
Payer: MEDICARE

## 2025-10-20 ENCOUNTER — APPOINTMENT (OUTPATIENT)
Dept: ORTHOPEDIC SURGERY | Facility: CLINIC | Age: OVER 89
End: 2025-10-20
Payer: MEDICARE